# Patient Record
Sex: FEMALE | Race: ASIAN | NOT HISPANIC OR LATINO | Employment: UNEMPLOYED | URBAN - METROPOLITAN AREA
[De-identification: names, ages, dates, MRNs, and addresses within clinical notes are randomized per-mention and may not be internally consistent; named-entity substitution may affect disease eponyms.]

---

## 2017-02-17 DIAGNOSIS — E11.9 TYPE 2 DIABETES MELLITUS WITHOUT COMPLICATION, WITHOUT LONG-TERM CURRENT USE OF INSULIN (H): ICD-10-CM

## 2017-02-17 DIAGNOSIS — I25.110 CORONARY ARTERY DISEASE INVOLVING NATIVE CORONARY ARTERY OF NATIVE HEART WITH UNSTABLE ANGINA PECTORIS (H): ICD-10-CM

## 2017-02-17 DIAGNOSIS — I25.119 CORONARY ARTERY DISEASE INVOLVING NATIVE HEART WITH ANGINA PECTORIS, UNSPECIFIED VESSEL OR LESION TYPE (H): ICD-10-CM

## 2017-02-19 RX ORDER — LISINOPRIL 2.5 MG/1
2.5 TABLET ORAL DAILY
Qty: 90 TABLET | Refills: 3 | Status: SHIPPED | OUTPATIENT
Start: 2017-02-19 | End: 2018-03-20

## 2017-02-19 RX ORDER — ISOSORBIDE MONONITRATE 30 MG/1
15 TABLET, EXTENDED RELEASE ORAL DAILY
Qty: 90 TABLET | Refills: 4 | Status: SHIPPED | OUTPATIENT
Start: 2017-02-19 | End: 2019-01-08

## 2017-02-19 RX ORDER — GLIPIZIDE 2.5 MG/1
2.5 TABLET, EXTENDED RELEASE ORAL DAILY
Qty: 30 TABLET | Refills: 3 | Status: SHIPPED | OUTPATIENT
Start: 2017-02-19 | End: 2017-02-22

## 2017-02-21 DIAGNOSIS — E11.9 TYPE 2 DIABETES MELLITUS WITHOUT COMPLICATION, WITHOUT LONG-TERM CURRENT USE OF INSULIN (H): ICD-10-CM

## 2017-02-21 RX ORDER — METFORMIN HCL 500 MG
2000 TABLET, EXTENDED RELEASE 24 HR ORAL
Qty: 360 TABLET | Refills: 3 | Status: SHIPPED | OUTPATIENT
Start: 2017-02-21 | End: 2018-02-12

## 2017-02-22 DIAGNOSIS — E11.9 TYPE 2 DIABETES MELLITUS WITHOUT COMPLICATION, WITHOUT LONG-TERM CURRENT USE OF INSULIN (H): ICD-10-CM

## 2017-02-22 RX ORDER — GLIPIZIDE 2.5 MG/1
2.5 TABLET, EXTENDED RELEASE ORAL DAILY
Qty: 90 TABLET | Refills: 3 | Status: SHIPPED | OUTPATIENT
Start: 2017-02-22 | End: 2017-04-13

## 2017-02-22 NOTE — TELEPHONE ENCOUNTER
Per Pharmacy: Patient is requesting a 90 day supply instead of 30 day supply, please advise. Thank You!

## 2017-03-02 ENCOUNTER — DOCUMENTATION ONLY (OUTPATIENT)
Dept: FAMILY MEDICINE | Facility: CLINIC | Age: 72
End: 2017-03-02

## 2017-03-02 DIAGNOSIS — J30.89 SEASONAL ALLERGIC RHINITIS DUE TO OTHER ALLERGIC TRIGGER: ICD-10-CM

## 2017-03-02 DIAGNOSIS — E78.00 HYPERCHOLESTEREMIA: ICD-10-CM

## 2017-03-02 DIAGNOSIS — J30.2 SEASONAL ALLERGIC RHINITIS, UNSPECIFIED ALLERGIC RHINITIS TRIGGER: ICD-10-CM

## 2017-03-02 RX ORDER — ATORVASTATIN CALCIUM 80 MG/1
80 TABLET, FILM COATED ORAL DAILY
Qty: 90 TABLET | Refills: 3 | Status: SHIPPED | OUTPATIENT
Start: 2017-03-02 | End: 2018-02-12

## 2017-03-02 RX ORDER — AZELASTINE HYDROCHLORIDE 0.5 MG/ML
1 SOLUTION/ DROPS OPHTHALMIC 2 TIMES DAILY
Qty: 3 BOTTLE | Refills: 3 | Status: SHIPPED | OUTPATIENT
Start: 2017-03-02 | End: 2019-01-08

## 2017-03-02 RX ORDER — LORATADINE 10 MG/1
10 TABLET ORAL DAILY PRN
Qty: 30 TABLET | Refills: 3 | Status: SHIPPED | OUTPATIENT
Start: 2017-03-02 | End: 2017-04-13

## 2017-04-11 DIAGNOSIS — E04.1 THYROID NODULE: Primary | ICD-10-CM

## 2017-04-11 NOTE — PROGRESS NOTES
Received request from [facility] Care Coordinator Criss Cantrell.  Phone [phone]. For Glucerna for the patient.  The patient has not bee to clinic since November 2016 and I have not seen her since  9/22/2016.  She has no medical diagnosis that I am aware of that requires that there is medical necessity for Glucerna supplement.  Her BMI is high at 33.  She does not show any significant weight loss in our records.  If she is having diet or weight loss issues then she needs a visit to discuss this and the possibility of supplement could be discussed at that visit.  I will not be sending a prescription for Glucerna at this time.  If she chooses this as a diet supplement on her own, I think it would be a fine choice for a diabetic and safe for her.    Routed to Care Coordinator to discuss with Criss and ask patient to schedule a follow up visit.  Ask Criss to help ensure patient comes for every 3 month visits for diabetes visits.    Afia Cortez MD

## 2017-04-12 NOTE — PROGRESS NOTES
Called Criss and left a message with the information from Dr Cortez below.    ADDENDUM 4/13/2017 11:58 AM: Criss returned my call and informed me that she passed the information on to patients daughter. She communicated everything that I relayed, including encouraging them to schedule an appointment with Dr Cortez.    Whit Hood

## 2017-04-13 DIAGNOSIS — E11.9 TYPE 2 DIABETES MELLITUS WITHOUT COMPLICATION, WITHOUT LONG-TERM CURRENT USE OF INSULIN (H): ICD-10-CM

## 2017-04-13 DIAGNOSIS — J30.2 SEASONAL ALLERGIC RHINITIS, UNSPECIFIED ALLERGIC RHINITIS TRIGGER: ICD-10-CM

## 2017-04-14 RX ORDER — GLIPIZIDE 2.5 MG/1
2.5 TABLET, EXTENDED RELEASE ORAL DAILY
Qty: 90 TABLET | Refills: 3 | Status: SHIPPED | OUTPATIENT
Start: 2017-04-14 | End: 2018-02-12

## 2017-04-14 RX ORDER — LORATADINE 10 MG/1
10 TABLET ORAL DAILY PRN
Qty: 30 TABLET | Refills: 3 | Status: SHIPPED | OUTPATIENT
Start: 2017-04-14 | End: 2017-11-01

## 2017-05-08 DIAGNOSIS — K59.09 OTHER CONSTIPATION: ICD-10-CM

## 2017-05-08 RX ORDER — SENNOSIDES 8.6 MG
1-2 TABLET ORAL DAILY PRN
Qty: 60 TABLET | Refills: 11 | Status: SHIPPED | OUTPATIENT
Start: 2017-05-08 | End: 2019-01-08

## 2017-11-01 DIAGNOSIS — J30.2 CHRONIC SEASONAL ALLERGIC RHINITIS, UNSPECIFIED TRIGGER: Primary | ICD-10-CM

## 2017-11-03 RX ORDER — LORATADINE 10 MG/1
10 TABLET ORAL DAILY PRN
Qty: 90 TABLET | Refills: 4 | Status: SHIPPED | OUTPATIENT
Start: 2017-11-03 | End: 2019-01-08

## 2018-02-12 DIAGNOSIS — E11.9 TYPE 2 DIABETES MELLITUS WITHOUT COMPLICATION, WITHOUT LONG-TERM CURRENT USE OF INSULIN (H): ICD-10-CM

## 2018-02-12 DIAGNOSIS — E78.00 HYPERCHOLESTEREMIA: ICD-10-CM

## 2018-02-12 DIAGNOSIS — I25.110 CORONARY ARTERY DISEASE INVOLVING NATIVE CORONARY ARTERY OF NATIVE HEART WITH UNSTABLE ANGINA PECTORIS (H): ICD-10-CM

## 2018-02-13 RX ORDER — METFORMIN HCL 500 MG
2000 TABLET, EXTENDED RELEASE 24 HR ORAL
Qty: 360 TABLET | Refills: 3 | Status: SHIPPED | OUTPATIENT
Start: 2018-02-13 | End: 2018-03-20

## 2018-02-13 RX ORDER — GLIPIZIDE 2.5 MG/1
2.5 TABLET, EXTENDED RELEASE ORAL DAILY
Qty: 90 TABLET | Refills: 3 | Status: SHIPPED | OUTPATIENT
Start: 2018-02-13 | End: 2018-03-20

## 2018-02-13 RX ORDER — ATORVASTATIN CALCIUM 80 MG/1
80 TABLET, FILM COATED ORAL DAILY
Qty: 90 TABLET | Refills: 3 | Status: SHIPPED | OUTPATIENT
Start: 2018-02-13 | End: 2019-01-08

## 2018-02-17 ENCOUNTER — HEALTH MAINTENANCE LETTER (OUTPATIENT)
Age: 73
End: 2018-02-17

## 2018-03-11 DIAGNOSIS — E11.9 TYPE 2 DIABETES MELLITUS WITHOUT COMPLICATION, WITHOUT LONG-TERM CURRENT USE OF INSULIN (H): Primary | ICD-10-CM

## 2018-03-11 DIAGNOSIS — D50.8 IRON DEFICIENCY ANEMIA SECONDARY TO INADEQUATE DIETARY IRON INTAKE: ICD-10-CM

## 2018-03-12 ENCOUNTER — OFFICE VISIT (OUTPATIENT)
Dept: FAMILY MEDICINE | Facility: CLINIC | Age: 73
End: 2018-03-12
Payer: MEDICARE

## 2018-03-12 VITALS
DIASTOLIC BLOOD PRESSURE: 83 MMHG | HEART RATE: 77 BPM | HEIGHT: 59 IN | BODY MASS INDEX: 35.84 KG/M2 | SYSTOLIC BLOOD PRESSURE: 144 MMHG | WEIGHT: 177.8 LBS

## 2018-03-12 DIAGNOSIS — M54.42 CHRONIC LEFT-SIDED LOW BACK PAIN WITH LEFT-SIDED SCIATICA: Primary | ICD-10-CM

## 2018-03-12 DIAGNOSIS — D50.8 IRON DEFICIENCY ANEMIA SECONDARY TO INADEQUATE DIETARY IRON INTAKE: ICD-10-CM

## 2018-03-12 DIAGNOSIS — E11.9 TYPE 2 DIABETES MELLITUS WITHOUT COMPLICATION, WITHOUT LONG-TERM CURRENT USE OF INSULIN (H): ICD-10-CM

## 2018-03-12 DIAGNOSIS — G89.29 CHRONIC LEFT-SIDED LOW BACK PAIN WITH LEFT-SIDED SCIATICA: Primary | ICD-10-CM

## 2018-03-12 LAB
ANION GAP SERPL CALCULATED.3IONS-SCNC: 9 MMOL/L (ref 5–18)
BUN SERPL-MCNC: 9 MG/DL (ref 8–22)
CALCIUM SERPL-MCNC: 8.4 MG/DL (ref 8.5–10.5)
CHLORIDE SERPL-SCNC: 104 MMOL/L (ref 98–107)
CHOLEST SERPL-MCNC: 185 MG/DL
CO2 SERPL-SCNC: 24 MMOL/L (ref 22–31)
CREAT SERPL-MCNC: 0.71 MG/DL (ref 0.6–1.1)
FASTING?: ABNORMAL
GLUCOSE SERPL-MCNC: 356 MG/DL (ref 70–125)
HBA1C MFR BLD: 10.8 % (ref 4.1–5.7)
HCT VFR BLD AUTO: 39.7 % (ref 35–47)
HDLC SERPL-MCNC: 43 MG/DL
HEMOGLOBIN: 11.4 G/DL (ref 11.7–15.7)
LDLC SERPL CALC-MCNC: 120 MG/DL
MCH RBC QN AUTO: 20.4 PG (ref 26.5–35)
MCHC RBC AUTO-ENTMCNC: 28.7 G/DL (ref 32–36)
MCV RBC AUTO: 71.1 FL (ref 78–100)
PLATELET # BLD AUTO: 224 K/UL (ref 150–450)
POTASSIUM SERPL-SCNC: 4 MMOL/L (ref 3.5–5)
RBC # BLD AUTO: 5.6 M/UL (ref 3.8–5.2)
SODIUM SERPL-SCNC: 137 MMOL/L (ref 136–145)
TRIGL SERPL-MCNC: 108 MG/DL
WBC # BLD AUTO: 4.8 K/UL (ref 4–11)

## 2018-03-12 RX ORDER — ACETAMINOPHEN 500 MG
1000 TABLET ORAL 3 TIMES DAILY PRN
Qty: 200 TABLET | Refills: 12 | Status: SHIPPED | OUTPATIENT
Start: 2018-03-12 | End: 2019-01-08

## 2018-03-12 RX ORDER — GABAPENTIN 300 MG/1
CAPSULE ORAL
Qty: 90 CAPSULE | Refills: 1 | Status: SHIPPED | OUTPATIENT
Start: 2018-03-12 | End: 2018-05-15

## 2018-03-12 NOTE — MR AVS SNAPSHOT
After Visit Summary   3/12/2018    Jenise Paz    MRN: [id number]           Patient Information     Date Of Birth          7/1/1946        Visit Information        Provider Department      3/12/2018 11:20 AM Afia Cortez MD [facility]        Today's Diagnoses     Chronic left-sided low back pain with left-sided sciatica    -  1    Type 2 diabetes mellitus without complication, without long-term current use of insulin (H)        Iron deficiency anemia secondary to inadequate dietary iron intake          Care Instructions    -Please bring your medications to your next appointment.    -Make appointment with Dr. Cortez next week and with the pharmacist.    -Should see a Physical Therapist.  Placed the order, someone will be calling you.    -          Follow-ups after your visit        Additional Services     PHYSICAL THERAPY REFERRAL       PT/OT REFERRAL  Jenise Paz  7/1/1946  Phone #: [phone] (home)    needed: Yes  Language: Jennifer    PT/OT  Facility:   [facility], P: [phone], F: [phone]    History: Pt requesting eval for mobility outside of the home    Precautions/Contraindications: None    Imaging Studies:     Surgical Procedure/Test Results: None    Treatment Goals:   Other:     Prognosis: good        Evaluate: Evaluate and Specific Order    Plan: Manual Therapy                  Follow-up notes from your care team     Return in about 1 week (around 3/19/2018).      Future tests that were ordered for you today     Open Future Orders        Priority Expected Expires Ordered    PHYSICAL THERAPY REFERRAL Routine  3/12/2019 3/12/2018            Who to contact     Please call your clinic at [phone] to:    Ask questions about your health    Make or cancel appointments    Discuss your medicines    Learn about your test results    Speak to your doctor            Additional Information About Your Visit        Tavo Information     Tavo is an  "electronic gateway that provides easy, online access to your medical records. With [employer], you can request a clinic appointment, read your test results, renew a prescription or communicate with your care team.     To sign up for [employer] visit the website at [email or web address]   You will be asked to enter the access code listed below, as well as some personal information. Please follow the directions to create your username and password.     Your access code is: [email or web address]  Expires: 6/10/2018 12:41 PM     Your access code will  in 90 days. If you need help or a new code, please contact your [facility] or call [phone] for assistance.        Care EveryWhere ID     This is your Care EveryWhere ID. This could be used by other organizations to access your [facility] medical records  [id number]        Your Vitals Were     Pulse Height BMI (Body Mass Index)             77 4' 10.5\" (148.6 cm) 36.53 kg/m2          Blood Pressure from Last 3 Encounters:   18 144/83   16 106/67   16 103/64    Weight from Last 3 Encounters:   18 177 lb 12.8 oz (80.6 kg)   16 162 lb 9.6 oz (73.8 kg)   16 160 lb 12.8 oz (72.9 kg)              We Performed the Following     Basic Metabolic Profile ([facility])     CBC with Plt ([facility])     Hemoglobin A1c ([facility])     Lipid [place] ([facility] - Results > 1 hr     Microalbumin Random Ur ([facility])          Today's Medication Changes          These changes are accurate as of 3/12/18 12:41 PM.  If you have any questions, ask your nurse or doctor.               Start taking these medicines.        Dose/Directions    gabapentin 300 MG capsule   Commonly known as:  NEURONTIN   Used for:  Chronic left-sided low back pain with left-sided sciatica   Started by:  Afia Cortez MD        Take 1 tablet (300 mg) every night for 3 days, then 1 tablet twice daily for 3 days, then 1 tablet three times daily   Quantity:  " 90 capsule   Refills:  1            Where to get your medicines      These medications were sent to [facility] - [place], [place] - [address], [place] [address]     Phone:  [phone]     acetaminophen 500 MG tablet    gabapentin 300 MG capsule                Primary Care Provider Office Phone # Fax #    Afia Cortez [phone]       [facility] [address] [facility] [address]        Equal Access to Services     ERICKA FRAIRE : Hadii aad ku hadasho Soomaali, waaxda luqadaha, qaybta kaalmada adeegyada, waxay idiin hayaan adeeg salvatorearagato latami . So [facility] [phone].    ATENCIÓN: Si habla español, tiene a alvarez disposición servicios gratuitos de asistencia lingüística. [facility] [phone].    We comply with applicable federal civil rights laws and [place] laws. We do not discriminate on the basis of race, color, national origin, age, disability, sex, sexual orientation, or gender identity.            Thank you!     Thank you for choosing [facility]  for your care. Our goal is always to provide you with excellent care. Hearing back from our patients is one way we can continue to improve our services. Please take a few minutes to complete the written survey that you may receive in the mail after your visit with us. Thank you!             Your Updated Medication List - Protect others around you: Learn how to safely use, store and throw away your medicines at www.disposemymeds.org.          This list is accurate as of 3/12/18 12:41 PM.  Always use your most recent med list.                   Brand Name Dispense Instructions for use Diagnosis    acetaminophen 500 MG tablet    TYLENOL    200 tablet    Take 2 tablets (1,000 mg) by mouth 3 times daily as needed    Chronic left-sided low back pain with left-sided sciatica       aspirin 81 MG tablet     90 tablet    Take 1 tablet daily    Coronary artery disease involving native coronary artery of native  heart with unstable angina pectoris (H)       atorvastatin 80 MG tablet    LIPITOR    90 tablet    Take 1 tablet (80 mg) by mouth daily    Hypercholesteremia       azelastine 0.05 % Soln ophthalmic solution    OPTIVAR    3 Bottle    Place 1 drop into both eyes 2 times daily    Seasonal allergic rhinitis due to other allergic trigger       blood glucose monitoring lancets      Test once daily        blood glucose monitoring test strip    LAURA CONTOUR    1 Box    Use to test blood sugars 1 times daily or as directed.    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-sided sciatica       glipiZIDE 2.5 MG 24 hr tablet    glipiZIDE XL    90 tablet    Take 1 tablet (2.5 mg) by mouth daily    Type 2 diabetes mellitus without complication, without long-term current use of insulin (H)       isosorbide mononitrate 30 MG 24 hr tablet    IMDUR    90 tablet    Take 0.5 tablets (15 mg) by mouth daily    Coronary artery disease involving native heart with angina pectoris, unspecified vessel or lesion type (H)       lisinopril 2.5 MG tablet    PRINIVIL/Zestril    90 tablet    Take 1 tablet (2.5 mg) by mouth daily    Coronary artery disease involving native coronary artery of native heart with unstable angina pectoris (H)       loratadine 10 MG tablet    CLARITIN    90 tablet    Take 1 tablet (10 mg) by mouth daily as needed for allergies    Chronic seasonal allergic rhinitis, unspecified trigger       metFORMIN 500 MG 24 hr tablet    GLUCOPHAGE-XR    360 tablet    Take 4 tablets (2,000 mg) by mouth daily (with dinner)    Type 2 diabetes mellitus without complication, without long-term current use of insulin (H)       metoprolol succinate 25 MG 24 hr tablet     TOPROL-XL    90 tablet    Take 1 tablet (25 mg) by mouth daily    Coronary artery disease involving native heart with angina pectoris, unspecified vessel or lesion type (H)       nitroGLYcerin 0.4 MG sublingual tablet    NITROSTAT    25 tablet    Place 1 tablet (0.4 mg) under the tongue every 5 minutes as needed for chest pain    Coronary artery disease involving native coronary artery of native heart with unstable angina pectoris (H)       order for DME     1 Device    Equipment being ordered: Cane    Coronary artery disease involving native coronary artery of native heart with unstable angina pectoris (H), DDD (degenerative disc disease), lumbar       sennosides 8.6 MG tablet    SENOKOT    60 tablet    Take 1-2 tablets by mouth daily as needed for constipation    Other constipation

## 2018-03-12 NOTE — LETTER
March 15, 2018      Jenise [address] [facility] [address]        Dear Jenise,    Please see below for your test results.    Resulted Orders   Hemoglobin A1c ([facility])   Result Value Ref Range    Hemoglobin A1C 10.8 (H) 4.1 - 5.7 %   CBC with Plt ([facility])   Result Value Ref Range    WBC 4.8 4.0 - 11.0 K/uL    RBC 5.6 (H) 3.8 - 5.2 M/uL    Hemoglobin 11.4 (L) 11.7 - 15.7 g/dL    Hematocrit 39.7 35.0 - 47.0 %    MCV 71.1 (L) 78.0 - 100.0 fL    MCH 20.4 (L) 26.5 - 35.0    MCHC 28.7 (L) 32.0 - 36.0 g/dL    Platelets 224.0 150.0 - 450.0 K/uL   Basic Metabolic Profile ([facility])   Result Value Ref Range    Sodium 137 136 - 145 mmol/L    Potassium 4.0 3.5 - 5.0 mmol/L    Chloride 104 98 - 107 mmol/L    CO2, Total 24 22 - 31 mmol/L    Anion Gap 9 5 - 18 mmol/L    Glucose 356 (H) 70 - 125 mg/dL    Calcium 8.4 (L) 8.5 - 10.5 mg/dL    Urea Nitrogen 9 8 - 22 mg/dL    Creatinine 0.71 0.60 - 1.10 mg/dL    GFR Estimate If Black >60 >60 mL/min/1.73m2    GFR Estimate >60 >60 mL/min/1.73m2    Narrative    Test performed by:  [facility]  [address] [place] [address]  Fasting Glucose reference range is 70-99 mg/dL per  American Diabetes Association (ADA) guidelines.   Lipid [place] ([facility]) - Results > 1 hr   Result Value Ref Range    Cholesterol 185 <=199 mg/dL    Triglycerides 108 <=149 mg/dL    HDL Cholesterol 43 (L) >=50 mg/dL    LDL Cholesterol Calculated 120 <=129 mg/dL    Fasting? Unknown     Narrative    Test performed by:  [facility]  [address] [place] [address]     Your diabetes is very poorly controlled and your sugar was high at 356.  Your cholesterol was improved from the past which will help protect your heart.  We need you to continue your statin medicine for this reason.  Your blood count was a little low but better than the past.  We should recheck your iron levels and consider further work up for what is causing the low blood count.  Your kidney function was  normal but we need to do a urine test at the next visit to look for leaking protein in the urine.      If you have any questions, please call the clinic to make an appointment.    Sincerely,    Afia Cortez MD

## 2018-03-12 NOTE — PATIENT INSTRUCTIONS
-Please bring your medications to your next appointment.    -Make appointment with Dr. Cortez next week and with the pharmacist.    -Should see a Physical Therapist.  Placed the order, someone will be calling you.    -[facility]  Phone: [phone]  Fax: [phone]  Clinic will contact patient to schedule  Jose 12, 2018    Pharm D  3/20/18 @ 2:50 with Moises
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
## 2018-03-13 ENCOUNTER — AMBULATORY - HEALTHEAST (OUTPATIENT)
Dept: ADMINISTRATIVE | Facility: REHABILITATION | Age: 73
End: 2018-03-13

## 2018-03-13 DIAGNOSIS — M54.40 CHRONIC LEFT-SIDED LOW BACK PAIN WITH SCIATICA: ICD-10-CM

## 2018-03-13 DIAGNOSIS — G89.29 CHRONIC LEFT-SIDED LOW BACK PAIN WITH SCIATICA: ICD-10-CM

## 2018-03-15 NOTE — PROGRESS NOTES
Your diabetes is very poorly controlled and your sugar was high at 356.  Your cholesterol was improved from the past which will help protect your heart.  We need you to continue your statin medicine for this reason.  Your blood count was a little low but better than the past.  We should recheck your iron levels and consider further work up for what is causing the low blood count.  Your kidney function was normal but we need to do a urine test at the next visit to look for leaking protein in the urine.

## 2018-03-20 ENCOUNTER — OFFICE VISIT (OUTPATIENT)
Dept: PHARMACY | Facility: CLINIC | Age: 73
End: 2018-03-20
Payer: MEDICARE

## 2018-03-20 ENCOUNTER — OFFICE VISIT (OUTPATIENT)
Dept: FAMILY MEDICINE | Facility: CLINIC | Age: 73
End: 2018-03-20
Payer: MEDICARE

## 2018-03-20 VITALS
HEART RATE: 87 BPM | DIASTOLIC BLOOD PRESSURE: 79 MMHG | BODY MASS INDEX: 36.2 KG/M2 | WEIGHT: 176.2 LBS | SYSTOLIC BLOOD PRESSURE: 135 MMHG

## 2018-03-20 DIAGNOSIS — I25.119 CORONARY ARTERY DISEASE INVOLVING NATIVE HEART WITH ANGINA PECTORIS, UNSPECIFIED VESSEL OR LESION TYPE (H): ICD-10-CM

## 2018-03-20 DIAGNOSIS — E11.9 TYPE 2 DIABETES MELLITUS WITHOUT COMPLICATION, WITHOUT LONG-TERM CURRENT USE OF INSULIN (H): ICD-10-CM

## 2018-03-20 DIAGNOSIS — I25.110 CORONARY ARTERY DISEASE INVOLVING NATIVE CORONARY ARTERY OF NATIVE HEART WITH UNSTABLE ANGINA PECTORIS (H): ICD-10-CM

## 2018-03-20 DIAGNOSIS — I10 ESSENTIAL HYPERTENSION: Primary | ICD-10-CM

## 2018-03-20 RX ORDER — GLIPIZIDE 10 MG/1
20 TABLET, FILM COATED, EXTENDED RELEASE ORAL DAILY
Qty: 180 TABLET | Refills: 4 | Status: SHIPPED | OUTPATIENT
Start: 2018-03-20 | End: 2019-01-08

## 2018-03-20 RX ORDER — LISINOPRIL 2.5 MG/1
2.5 TABLET ORAL DAILY
Qty: 90 TABLET | Refills: 3 | Status: SHIPPED | OUTPATIENT
Start: 2018-03-20 | End: 2019-01-08

## 2018-03-20 RX ORDER — METOPROLOL SUCCINATE 25 MG/1
25 TABLET, EXTENDED RELEASE ORAL DAILY
Qty: 90 TABLET | Refills: 4 | Status: SHIPPED | OUTPATIENT
Start: 2018-03-20 | End: 2019-01-08

## 2018-03-20 RX ORDER — NITROGLYCERIN 0.4 MG/1
0.4 TABLET SUBLINGUAL EVERY 5 MIN PRN
Qty: 25 TABLET | Refills: 12 | Status: SHIPPED | OUTPATIENT
Start: 2018-03-20 | End: 2019-01-08

## 2018-03-20 NOTE — MR AVS SNAPSHOT
After Visit Summary   3/20/2018    Jenise Paz    MRN: [id number]           Patient Information     Date Of Birth          1946        Visit Information        Provider Department      3/20/2018 2:30 PM Afia Cortez MD [facility]        Today's Diagnoses     Coronary artery disease involving native coronary artery of native heart with unstable angina pectoris (H)        Coronary artery disease involving native heart with angina pectoris, unspecified vessel or lesion type (H)        Type 2 diabetes mellitus without complication, without long-term current use of insulin (H)          Care Instructions    Restart lisinopril 1 pill once daily  Restart metoprolol 1 pill once daily  Restart aspirin 1 pill once daily  Increase Glipizide to new strength (10mg)- 2 pills once daily  Start Januvia 100mg once daily  Fill new nitroglycerin to put under tongue when you have chest pain.    Stop the Metformin for now.                Follow-ups after your visit        Follow-up notes from your care team     Return in about 1 month (around 2018).      Who to contact     Please call your clinic at [phone] to:    Ask questions about your health    Make or cancel appointments    Discuss your medicines    Learn about your test results    Speak to your doctor            Additional Information About Your Visit        MyChart Information     Nettlet is an electronic gateway that provides easy, online access to your medical records. With [employer], you can request a clinic appointment, read your test results, renew a prescription or communicate with your care team.     To sign up for Nettlet visit the website at [email or web address]   You will be asked to enter the access code listed below, as well as some personal information. Please follow the directions to create your username and password.     Your access code is: [email or web address]  Expires: 6/10/2018 12:41 PM     Your access code will  in 90 days.  If you need help or a new code, please contact your [facility] or call [phone] for assistance.        Care EveryWhere ID     This is your Care EveryWhere ID. This could be used by other organizations to access your [place] medical records  [id number]        Your Vitals Were     Pulse BMI (Body Mass Index)                87 36.2 kg/m2           Blood Pressure from Last 3 Encounters:   03/20/18 135/79   03/12/18 144/83   11/07/16 106/67    Weight from Last 3 Encounters:   03/20/18 176 lb 3.2 oz (79.9 kg)   03/12/18 177 lb 12.8 oz (80.6 kg)   11/07/16 162 lb 9.6 oz (73.8 kg)              Today, you had the following     No orders found for display         Today's Medication Changes          These changes are accurate as of 3/20/18  3:56 PM.  If you have any questions, ask your nurse or doctor.               Start taking these medicines.        Dose/Directions    sitagliptin 100 MG tablet   Commonly known as:  JANUVIA   Used for:  Type 2 diabetes mellitus without complication, without long-term current use of insulin (H)   Started by:  Afia Cortez MD        Dose:  100 mg   Take 1 tablet (100 mg) by mouth daily   Quantity:  90 tablet   Refills:  3         These medicines have changed or have updated prescriptions.        Dose/Directions    glipiZIDE 10 MG 24 hr tablet   Commonly known as:  glipiZIDE XL   This may have changed:    - medication strength  - how much to take   Used for:  Type 2 diabetes mellitus without complication, without long-term current use of insulin (H)   Changed by:  Afia Cortez MD        Dose:  20 mg   Take 2 tablets (20 mg) by mouth daily   Quantity:  180 tablet   Refills:  4         Stop taking these medicines if you haven't already. Please contact your care team if you have questions.     metFORMIN 500 MG 24 hr tablet   Commonly known as:  GLUCOPHAGE-XR   Stopped by:  Afia Cortez MD                Where to get your medicines      These  medications were sent to [facility] - [place], [place] - [address], [place] [address]     Phone:  [phone]     aspirin 81 MG tablet    glipiZIDE 10 MG 24 hr tablet    lisinopril 2.5 MG tablet    metoprolol succinate 25 MG 24 hr tablet    nitroGLYcerin 0.4 MG sublingual tablet    sitagliptin 100 MG tablet                Primary Care Provider Office Phone # Fax #    Afia Cortez [phone]       [address] [address]        Equal Access to Services     : Hadii aad ku hadasho Soomaali, waaxda luqadaha, qaybta kaalmada adeegyada, betty shields . So [facility] [phone].    ATENCIÓN: Si habla español, tiene a alvarez disposición servicios gratuitos de asistencia lingüística. [facility] [phone].    We comply with applicable federal civil rights laws and [place] laws. We do not discriminate on the basis of race, color, national origin, age, disability, sex, sexual orientation, or gender identity.            Thank you!     Thank you for choosing [facility]  for your care. Our goal is always to provide you with excellent care. Hearing back from our patients is one way we can continue to improve our services. Please take a few minutes to complete the written survey that you may receive in the mail after your visit with us. Thank you!             Your Updated Medication List - Protect others around you: Learn how to safely use, store and throw away your medicines at www.disposemymeds.org.          This list is accurate as of 3/20/18  3:56 PM.  Always use your most recent med list.                   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-sided sciatica       glipiZIDE 10 MG 24 hr tablet    glipiZIDE XL    180 tablet    Take 2 tablets (20 mg) by mouth daily    Type 2 diabetes mellitus without complication, without long-term current use of insulin (H)       isosorbide mononitrate 30 MG 24 hr tablet    IMDUR    90 tablet    Take 0.5 tablets (15 mg) by mouth daily    Coronary artery disease involving native heart with angina pectoris, unspecified vessel or lesion type (H)       lisinopril 2.5 MG tablet    PRINIVIL/Zestril    90 tablet    Take 1 tablet (2.5 mg) by mouth daily    Coronary artery disease involving native coronary artery of native heart with unstable angina pectoris (H)       loratadine 10 MG tablet    CLARITIN    90 tablet    Take 1 tablet (10 mg) by mouth daily as needed for allergies    Chronic seasonal allergic rhinitis, unspecified trigger       metoprolol succinate 25 MG 24 hr tablet    TOPROL-XL    90 tablet    Take 1 tablet (25 mg) by mouth daily    Coronary artery disease involving native heart with angina pectoris, unspecified vessel or  lesion type (H)       nitroGLYcerin 0.4 MG sublingual tablet    NITROSTAT    25 tablet    Place 1 tablet (0.4 mg) under the tongue every 5 minutes as needed for chest pain    Coronary artery disease involving native coronary artery of native heart with unstable angina pectoris (H)       order for DME     1 Device    Equipment being ordered: Cane    Coronary artery 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

## 2018-03-20 NOTE — MR AVS SNAPSHOT
After Visit Summary   3/20/2018    Jenise Paz    MRN: [id number]           Patient Information     Date Of Birth          1946        Visit Information        Provider Department      3/20/2018 2:50 PM Beth De Leon, [facility]        Today's Diagnoses     Essential hypertension    -  1    Type 2 diabetes mellitus without complication, without long-term current use of insulin (H)        Coronary artery disease involving native coronary artery of native heart with unstable angina pectoris (H)           Follow-ups after your visit        Who to contact     Please call your clinic at [phone] to:    Ask questions about your health    Make or cancel appointments    Discuss your medicines    Learn about your test results    Speak to your doctor            Additional Information About Your Visit        MyChart Information     From [employer] is an electronic gateway that provides easy, online access to your medical records. With [employer], you can request a clinic appointment, read your test results, renew a prescription or communicate with your care team.     To sign up for [employer] visit the website at [email or web address]   You will be asked to enter the access code listed below, as well as some personal information. Please follow the directions to create your username and password.     Your access code is: [email or web address]  Expires: 6/10/2018 12:41 PM     Your access code will  in 90 days. If you need help or a new code, please contact your [facility] or call [phone] for assistance.        Care EveryWhere ID     This is your Care EveryWhere ID. This could be used by other organizations to access your [place] medical records  [id number]         Blood Pressure from Last 3 Encounters:   18 135/79   18 144/83   16 106/67    Weight from Last 3 Encounters:   18 176 lb 3.2 oz (79.9 kg)   18 177 lb 12.8 oz (80.6 kg)   16 162  lb 9.6 oz (73.8 kg)              We Performed the Following     MTM, EA ADDITIONAL 15 MIN (09323) x 3 (= 45 min.)          Today's Medication Changes          These changes are accurate as of 3/20/18 11:59 PM.  If you have any questions, ask your nurse or doctor.               Start taking these medicines.        Dose/Directions    sitagliptin 100 MG tablet   Commonly known as:  JANUVIA   Used for:  Type 2 diabetes mellitus without complication, without long-term current use of insulin (H)   Started by:  Afia Cortez MD        Dose:  100 mg   Take 1 tablet (100 mg) by mouth daily   Quantity:  90 tablet   Refills:  3         These medicines have changed or have updated prescriptions.        Dose/Directions    glipiZIDE 10 MG 24 hr tablet   Commonly known as:  glipiZIDE XL   This may have changed:    - medication strength  - how much to take   Used for:  Type 2 diabetes mellitus without complication, without long-term current use of insulin (H)   Changed by:  Afia Cortez MD        Dose:  20 mg   Take 2 tablets (20 mg) by mouth daily   Quantity:  180 tablet   Refills:  4         Stop taking these medicines if you haven't already. Please contact your care team if you have questions.     metFORMIN 500 MG 24 hr tablet   Commonly known as:  GLUCOPHAGE-XR   Stopped by:  Afia Cortez MD                Where to get your medicines      These medications were sent to [facility] - [place], [place] - [address], [place] [address]     Phone:  [phone]     aspirin 81 MG tablet    glipiZIDE 10 MG 24 hr tablet    lisinopril 2.5 MG tablet    metoprolol succinate 25 MG 24 hr tablet    nitroGLYcerin 0.4 MG sublingual tablet    sitagliptin 100 MG tablet                Primary Care Provider Office Phone # Fax #    Afia Cortez [phone]       [address] [address]        Equal Access to Services     ERICKA FRAIRE AH: Charlie valdez  Isrrael, wabettyda luqadaha, qaybta kaaltony villalba, betty yusefin hayaan manishha salvatorebetsey laSantiagocherrie yao. So [facility] [phone].    ATENCIÓN: Si nadja bush, tiene a alvarez disposición servicios gratuitos de asistencia lingüística. Alonso al [phone].    We comply with applicable federal civil rights laws and [place] laws. We do not discriminate on the basis of race, color, national origin, age, disability, sex, sexual orientation, or gender identity.            Thank you!     Thank you for choosing [facility]  for your care. Our goal is always to provide you with excellent care. Hearing back from our patients is one way we can continue to improve our services. Please take a few minutes to complete the written survey that you may receive in the mail after your visit with us. Thank you!             Your Updated Medication List - Protect others around you: Learn how to safely use, store and throw away your medicines at www.disposemymeds.org.          This list is accurate as of 3/20/18 11:59 PM.  Always use your most recent med list.                   Brand Name Dispense Instructions for use Diagnosis    acetaminophen 500 MG tablet    TYLENOL    200 tablet    Take 2 tablets (1,000 mg) by mouth 3 times daily as needed    Chronic left-sided low back pain with left-sided sciatica       aspirin 81 MG tablet     90 tablet    Take 1 tablet daily    Coronary artery disease involving native coronary artery of native heart with unstable angina pectoris (H)       atorvastatin 80 MG tablet    LIPITOR    90 tablet    Take 1 tablet (80 mg) by mouth daily    Hypercholesteremia       azelastine 0.05 % Soln ophthalmic solution    OPTIVAR    3 Bottle    Place 1 drop into both eyes 2 times daily    Seasonal allergic rhinitis due to other allergic trigger       blood glucose monitoring lancets      Test once daily        blood glucose monitoring test strip    LAURA CONTOUR    1 Box    Use to test blood sugars 1 times daily or as directed.     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-sided sciatica       glipiZIDE 10 MG 24 hr tablet    glipiZIDE XL    180 tablet    Take 2 tablets (20 mg) by mouth daily    Type 2 diabetes mellitus without complication, without long-term current use of insulin (H)       isosorbide mononitrate 30 MG 24 hr tablet    IMDUR    90 tablet    Take 0.5 tablets (15 mg) by mouth daily    Coronary artery disease involving native heart with angina pectoris, unspecified vessel or lesion type (H)       lisinopril 2.5 MG tablet    PRINIVIL/Zestril    90 tablet    Take 1 tablet (2.5 mg) by mouth daily    Coronary artery disease involving native coronary artery of native heart with unstable angina pectoris (H)       loratadine 10 MG tablet    CLARITIN    90 tablet    Take 1 tablet (10 mg) by mouth daily as needed for allergies    Chronic seasonal allergic rhinitis, unspecified trigger       metoprolol succinate 25 MG 24 hr tablet    TOPROL-XL    90 tablet    Take 1 tablet (25 mg) by mouth daily    Coronary artery disease involving native heart with angina pectoris, unspecified vessel or lesion type (H)       nitroGLYcerin 0.4 MG sublingual tablet    NITROSTAT    25 tablet    Place 1 tablet (0.4 mg) under the tongue every 5 minutes as needed for chest pain    Coronary artery disease involving native coronary artery of native heart with unstable angina pectoris (H)       sennosides 8.6 MG tablet    SENOKOT    60 tablet    Take 1-2 tablets by mouth daily as needed for constipation    Other constipation       sitagliptin 100 MG tablet    JANUVIA    90 tablet    Take 1 tablet (100 mg) by mouth daily    Type 2 diabetes mellitus without complication, without long-term  current use of insulin (H)

## 2018-03-20 NOTE — PROGRESS NOTES
Medication Management Note                                                       Jenise was referred by Dr. Cortez for pharmacy services for med management.     MEDICATION REVIEW:  Discussed all medication indications, dosage and effectiveness, adverse effects, and adherence with patient/caregiver.    Pt had meds with them: yes - only had APAP, atorvastatin, gabapentin, glipizide, isosorbide, and loratadine and said that is all she is taking.   Pt had med list with them: no  Pt was knowledgeable about meds: no  Medications set up by: daughter-in-law   Medications administered by someone else (e.g., LTC[facility]): No  Pt uses a medication box or automated dispenser: yes  Called pharmacy to obtain or clarify med list:  no  Called HHN or [facility] to obtain or clarify med list:  no    Medication Discrepancies  Medications on EMR med list that pt is NOT taking:  Yes, patient did not have bottles for ASA, lisinopril, metformin, metoprolol, and NTG.     Metformin removed from her medication list but all others were left as she is supposed to take them  Medications pt IS taking that are NOT on EMR med list (e.g., from specialist, hospital): none  OTC meds/ dietary supplements pt taking on own that are NOT on EMR med list:  yes, patient drinks herbal teas  Dosage listed differently than how patient is taking: none  Frequency listed differently than how patient is taking: possibly - patient may only be taking gabapentin BID as her pill box only has 2 compartments per day. Based on Rx titration from 3/12 she should be on TID currently. This was not changed on her medication list because she was not sure how often she took it.   Duplicate medication on list (two occurrences of the same medication):  none  TOTAL NUMBER OF MEDICATION DISCREPANCIES:  6    Subjective                                                       Patient reports the following problems or concerns with their medications:  She wants to take fewer medications; she prefers  "taking natural, traditional meds and boil roots to make medicinal tea   Patient reports the following adverse reactions to medications:  none  Pt reports missing doses:  1-2 times per week  Additional subjective information (e.g., reason for visit, frequency of PRNs, reasons meds were D/C ed):    Patient has her daughter-in-law set up her medications for her. She states she doesn't \"forget\" to take her medication, but 1-2 times per week she decides to skip doses because she does not want to take them. Her son thinks she actually skips doses more frequently than this, and they feel they cannot \"force\" her to take meds she doesn't want to take.     Patient states she only uses [facility].    When talking about the glipizide, she mentioned she tries \"not to eat too much of this\" because she is trying to change her diet instead.     She stated she does not take the NTG anymore because it was as needed and she does not have any more chest pain.    She reports a home BG of 237 sometime last week.      Objective                                                       Patient Active Problem List   Diagnosis     Abnormal Pap smear of cervix     Cervical spondylosis     PTSD (post-traumatic stress disorder)     Hyperlipemia     Hypertension     Low back pain Chronic     DDD (degenerative disc disease), lumbar     Muscle Aches, Generalized (Myalgia)     Thyroid nodule     History of subtotal thyroidectomy     Vitamin D deficiency     Seasonal allergies     Noncompliance with medication regimen     Type 2 diabetes mellitus without complication, without long-term current use of insulin (H)     Coronary artery disease involving native coronary artery of native heart with unstable angina pectoris (H)     Anemia, iron deficiency     S/P CABG (coronary artery bypass graft)     Current Outpatient Prescriptions   Medication Sig Dispense Refill     lisinopril (PRINIVIL/ZESTRIL) 2.5 MG tablet Take 1 tablet (2.5 mg) by mouth daily 90 " tablet 3     metoprolol succinate (TOPROL-XL) 25 MG 24 hr tablet Take 1 tablet (25 mg) by mouth daily 90 tablet 4     glipiZIDE (GLUCOTROL XL) 10 MG 24 hr tablet Take 2 tablets (20 mg) by mouth daily 180 tablet 4     sitagliptin (JANUVIA) 100 MG tablet Take 1 tablet (100 mg) by mouth daily 90 tablet 3     gabapentin (NEURONTIN) 300 MG capsule Take 1 tablet (300 mg) every night for 3 days, then 1 tablet twice daily for 3 days, then 1 tablet three times daily 90 capsule 1     acetaminophen (TYLENOL) 500 MG tablet Take 2 tablets (1,000 mg) by mouth 3 times daily as needed 200 tablet 12     aspirin 81 MG tablet Take 1 tablet daily 90 tablet 4     atorvastatin (LIPITOR) 80 MG tablet Take 1 tablet (80 mg) by mouth daily 90 tablet 3     metFORMIN (GLUCOPHAGE-XR) 500 MG 24 hr tablet Take 4 tablets (2,000 mg) by mouth daily (with dinner) 360 tablet 3     [DISCONTINUED] glipiZIDE (GLIPIZIDE XL) 2.5 MG 24 hr tablet Take 1 tablet (2.5 mg) by mouth daily 90 tablet 3     loratadine (CLARITIN) 10 MG tablet Take 1 tablet (10 mg) by mouth daily as needed for allergies 90 tablet 4     sennosides (SENOKOT) 8.6 MG tablet Take 1-2 tablets by mouth daily as needed for constipation 60 tablet 11     azelastine (OPTIVAR) 0.05 % SOLN ophthalmic solution Place 1 drop into both eyes 2 times daily 3 Bottle 3     isosorbide mononitrate (IMDUR) 30 MG 24 hr tablet Take 0.5 tablets (15 mg) by mouth daily 90 tablet 4     [DISCONTINUED] lisinopril (PRINIVIL/ZESTRIL) 2.5 MG tablet Take 1 tablet (2.5 mg) by mouth daily 90 tablet 3     blood glucose monitoring ([email or web address] CONTOUR) test strip Use to test blood sugars 1 times daily or as directed. 1 Box 11     order for DME Equipment being ordered: Cane 1 Device 0     nitroglycerin (NITROSTAT) 0.4 MG SL tablet Place 1 tablet (0.4 mg) under the tongue every 5 minutes as needed for chest pain 25 tablet 12     [DISCONTINUED] metoprolol (TOPROL-XL) 25 MG 24 hr tablet Take 1 tablet (25 mg) by mouth daily 90 tablet  4     Lancets (MICROLET) MISC Test once daily       Social History   Substance Use Topics     Smoking status: Never Smoker     Smokeless tobacco: Never Used     Alcohol use No     Estimated Creatinine Clearance: 94.3 mL/min (based on Cr of 0.71).    Lab Results   Component Value Date    A1C 10.8 03/12/2018    A1C 7.2 11/07/2016    A1C 6.9 04/27/2016    A1C 6.7 12/10/2015    A1C 6.7 04/29/2015     Last Basic Metabolic Panel:  Lab Results   Component Value Date     03/12/2018      Lab Results   Component Value Date    POTASSIUM 4.0 03/12/2018     Lab Results   Component Value Date    CHLORIDE 104 03/12/2018     Lab Results   Component Value Date    SAHIL 8.4 03/12/2018     Lab Results   Component Value Date    CO2 30.0 09/12/2016     Lab Results   Component Value Date    BUN 9 03/12/2018     Lab Results   Component Value Date    CR 0.71 03/12/2018     Lab Results   Component Value Date     03/12/2018     BP Readings from Last 3 Encounters:   03/20/18 135/79   03/12/18 144/83   11/07/16 106/67     The 10-year ASCVD risk score (Corisherman SHELDON Jr, et al., 2013) is: 23.5%    Values used to calculate the score:      Age: 71 years      Sex: Female      Is Non- : No      Diabetic: Yes      Tobacco smoker: No      Systolic Blood Pressure: 135 mmHg      Is BP treated: Yes      HDL Cholesterol: 43 mg/dL      Total Cholesterol: 185 mg/dL    PHQ-9 score:    PHQ-9 SCORE 5/15/2014   Total Score 7     Assessment                                                       CAD -  uncontrolled     Patient with history of CABG in 2016.     She is taking high intensity atorvastatin and isosorbide.     She at one point was prescribed ASA, metoprolol, lisinopril and NTG as they are on her medication list, but she has not been taking them. These are medications that would benefit her due to her CAD. She is not on any other blood thinners or antiplatelet medications nor does she have other contraindications to ASA.      The NTG also another medication to be added because if she has sudden onset chest pain, she will need it for vasodilation and symptom relief, or early management if she suffers an MI.     Patient /79 and HR 87 in clinic today, so adding back metoprolol would be safe.     HTN -  uncontrolled     BP in clinic today 135/79 which is at goal of <140/90 per JNC8 but over goal of <130/80 per 2017 AHA/ACC guidelines.     Patient is not taking her lisinopril and metoprolol (see above) and her BP in clinic on 3/12 was elevated.     Potassium was 4.0 in clinic 3/12 and CrCL estimated 70 mL/min per calculator.     Type 2 Diabetes -  uncontrolled     Patient A1c was 10.8 on 3/12 which is over goal of <7%.     She takes glipizide (sometimes) but is not taking the metformin. She is also taking gabapentin.     Due to her severely elevated A1c, it would be beneficial to increase her to the maximum dose of glipizide and start another agent like sitagliptin.     Metformin should be used, but starting metformin today may not be best as it tends to cause stomach upset which may be a motivation for her to stop taking her medications. Her kidney function is fine, so this would ideally be started in the near future.     Osteoporosis/Osteopenia -  uncontrolled     Patient had a DEXA scan from 2014 with lumbar spine T score of -2.6; osteopenia at other sites    Patient does not currently have any treatments for osteroporosis    Polypharmacy -  uncontrolled     Patient takes many medications    She describes skipping her medications purposefully because she doesn't want to take them    Plan/Recommendations                                                       Updated medication list in the EMR; deleted meds patient no longer taking and added meds patient is now taking, and changed doses where there was a dose discrepancy.    All medications were reviewed and found to be indicated, effective, safe and convenient/ affordable unless  drug therapy problem(s) was/were identified, as are described below.      Completed at this visit     CAD/HTN    Continue atorvastatin and isosorbide.     Restart aspirin, metoprolol, lisinopril and nitroglycerin.     Type 2 Diabetes    Increase glipizide to 20 mg daily.     Initiate sitagliptin.    Polypharmacy     Educated patient and son extensively about the importance of medication adherence.     To be completed at a future visit    Osteoporosis/osteopenia     Consider initiating anti-resorptive therapy in the future.     Consider first rechecking DEXA    Type 2 Diabetes      Reassess home BG levels.    Initiate metformin XR at 500mg QD at next visit and titrate.     Polypharmacy     Reassess medication adherence and reiterate importance     Health maintenance    Give PCV13 at clinic    Shingrix is also indicated; would need to get at a pharmacy    Options for treatment and/or follow-up care were reviewed with the patient.  Jenise was engaged and actively involved in the decision making process, verbalized understanding of the options discussed, and was satisfied with the final plan.    Follow-up                                                       Patient should follow up with Dr. Cortez.  Patient was provided with written instructions/medication list via AVS.     Dr. Cortez was provided the recommendations above  in clinic today and Dr. Cortez was available for supervision during this visit and is the authorizing prescriber for this visit through the pharmacist collaborative practice agreement.    Yaneth Araujo, PharmD Student    Drug therapy problems identified  1. Med: aspirin - Indication - needs therapy - Resolution: Intiate Drug; resolved  2. Med: metoprolol - Indication - needs additional therapy - Resolution: Intiate Drug; resolved   3. Med: lisinopril - Indication - needs additional therapy - Resolution: Intiate Drug; resolved  4. Med: glipizide - Efficacy  - dose too low - Resolution: Change dose;  resolved  5.   Med: sitagliptin - Indication - needs additional drug therapy - Resolution: Intiate Drug; resolved  6.   Med: metformin - Indication - needs additional drug therapy - Resolution: Intiate Drug; deferred to future visit    # of medical conditions addressed: 3  # of medications addressed: 11  # of medication discrepancies identified: 6  # of DTP identified: 6  Time spent: 30 minutes  Level of service: 4    The student acted as scribe and the encounter documented was completely performed by myself. I have reviewed and verified the student s documentation and found it to be correct and complete.  Beth De Leon, Pharm.D.

## 2018-03-22 NOTE — PROGRESS NOTES
There are no exam notes on file for this visit.  Chief Complaint   Patient presents with     Recheck Medication     Medication Reconciliation     Pharm D to reconcile     Blood pressure 135/79, pulse 87, weight 79.9 kg (176 lb 3.2 oz).                 SUZAN Paz is a 71 year old  female with a PMH significant for:     Patient Active Problem List   Diagnosis     Abnormal Pap smear of cervix     Cervical spondylosis     PTSD (post-traumatic stress disorder)     Hyperlipemia     Hypertension     Low back pain Chronic     DDD (degenerative disc disease), lumbar     Muscle Aches, Generalized (Myalgia)     Thyroid nodule     History of subtotal thyroidectomy     Vitamin D deficiency     Seasonal allergies     Noncompliance with medication regimen     Type 2 diabetes mellitus without complication, without long-term current use of insulin (H)     Coronary artery disease involving native coronary artery of native heart with unstable angina pectoris (H)     Anemia, iron deficiency     S/P CABG (coronary artery bypass graft)     She presents with follow-up of diabetes, heart disease, review medications.    Last visit we rechecked labs and her diabetes is out of control.  Is unclear what medications she was taking at that time.  She brought her medications today and we are able to review those Pharm.D. helped with this today see their note for further details.  It is difficult for her to discuss her meal she states she does not have a lot of appetite and would like to just drink a liquid supplement instead of eating food.  I discussed that this is the not recommended and is likely not to be covered in her case.  Her and her son did accept some counseling on food choices to help with diabetes control.  Seems that her only diabetes medication is glipizide 2.5 mg daily did not have metformin with her.    Had coronary artery bypass surgery 2016.  Did not have several of her cardiac meds with her today.  Including aspirin  lisinopril and metoprolol.  Discussed the importance of these medications for preventing heart disease and she agrees to take them.  Denies any current chest pain although does have pain over her scar.    PMH, Medications and Allergies were reviewed and updated as needed.     [employer]  was used for this visit           Physical Exam:     Vitals:    03/20/18 1429   BP: 135/79   Pulse: 87   Weight: 79.9 kg (176 lb 3.2 oz)     Body mass index is 36.2 kg/(m^2).    Exam:  Constitutional: healthy, alert and no distress  Psychiatric: mentation appears normal and affect normal/bright        Results for orders placed or performed in visit on 03/12/18   Hemoglobin A1c ([facility])   Result Value Ref Range    Hemoglobin A1C 10.8 (H) 4.1 - 5.7 %   CBC with Plt ([facility])   Result Value Ref Range    WBC 4.8 4.0 - 11.0 K/uL    RBC 5.6 (H) 3.8 - 5.2 M/uL    Hemoglobin 11.4 (L) 11.7 - 15.7 g/dL    Hematocrit 39.7 35.0 - 47.0 %    MCV 71.1 (L) 78.0 - 100.0 fL    MCH 20.4 (L) 26.5 - 35.0    MCHC 28.7 (L) 32.0 - 36.0 g/dL    Platelets 224.0 150.0 - 450.0 K/uL   [facility]   Result Value Ref Range    Sodium 137 136 - 145 mmol/L    Potassium 4.0 3.5 - 5.0 mmol/L    Chloride 104 98 - 107 mmol/L    CO2, Total 24 22 - 31 mmol/L    Anion Gap 9 5 - 18 mmol/L    Glucose 356 (H) 70 - 125 mg/dL    Calcium 8.4 (L) 8.5 - 10.5 mg/dL    Urea Nitrogen 9 8 - 22 mg/dL    Creatinine 0.71 0.60 - 1.10 mg/dL    GFR Estimate If Black >60 >60 mL/min/1.73m2    GFR Estimate >60 >60 mL/min/1.73m2    Narrative    Test performed by:  [facility]  [address] [place] [address]  Fasting Glucose reference range is 70-99 mg/dL per  American Diabetes Association (ADA) guidelines.   Lipid [place] ([facility]) - Results > 1 hr   Result Value Ref Range    Cholesterol 185 <=199 mg/dL    Triglycerides 108 <=149 mg/dL    HDL Cholesterol 43 (L) >=50 mg/dL    LDL Cholesterol Calculated 120 <=129 mg/dL    Fasting? Unknown      Narrative    [facility]  [facility]  [address] [place] [address]       Assessment and Plan     Jenise was seen today for recheck medication and medication reconciliation.    Diagnoses and all orders for this visit:    Coronary artery disease involving native coronary artery of native heart with unstable angina pectoris (H): Restarted lisinopril aspirin metoprolol.  Continue statin.  Refilled nitroglycerin.  Will need close follow-up of blood pressure and kidney function.  -     lisinopril (PRINIVIL/ZESTRIL) 2.5 MG tablet; Take 1 tablet (2.5 mg) by mouth daily  -     aspirin 81 MG tablet; Take 1 tablet daily  -     nitroGLYcerin (NITROSTAT) 0.4 MG sublingual tablet; Place 1 tablet (0.4 mg) under the tongue every 5 minutes as needed for chest pain  -     metoprolol succinate (TOPROL-XL) 25 MG 24 hr tablet; Take 1 tablet (25 mg) by mouth daily    Type 2 diabetes mellitus without complication, without long-term current use of insulin (H): Poor control at this time.  Due to history of difficulty remembering and taking many medications.  We decided to go with glipizide and Januvia as a starting point for her diabetes.  Mention to her that she may need insulin in the future.  Had also started multiple new cardiac meds today so having her take 4 pills of metformin and titrating it up was unlikely to go well.  Plan to discuss titrating up metformin at next visit.  Gave handouts on healthy eating and diabetic diet for her and her family to review plan to have RNs do more formal counseling at next visit versus sending her to formal diabetes education.  -     glipiZIDE (GLUCOTROL XL) 10 MG 24 hr tablet; Take 2 tablets (20 mg) by mouth daily  -     sitagliptin (JANUVIA) 100 MG tablet; Take 1 tablet (100 mg) by mouth daily    Patient Instructions   Restart lisinopril 1 pill once daily  Restart metoprolol 1 pill once daily  Restart aspirin 1 pill once daily  Increase Glipizide to new strength (10mg)- 2  pills once daily  Start Januvia 100mg once daily  Fill new nitroglycerin to put under tongue when you have chest pain.    Stop the Metformin for now.          Return in about 1 month (around 4/20/2018).     Options for treatment and/or follow-up care were reviewed with the patient. Jenise Paz was engaged and actively involved in the decision making process. She verbalized understanding of the options discussed and was satisfied with the final plan.    Afia Cortez MD

## 2018-04-03 ENCOUNTER — OFFICE VISIT - HEALTHEAST (OUTPATIENT)
Dept: PHYSICAL THERAPY | Facility: REHABILITATION | Age: 73
End: 2018-04-03

## 2018-04-03 DIAGNOSIS — R26.81 UNSTEADINESS ON FEET: ICD-10-CM

## 2018-04-03 DIAGNOSIS — M62.81 GENERALIZED MUSCLE WEAKNESS: ICD-10-CM

## 2018-04-03 DIAGNOSIS — R26.89 ANTALGIC GAIT: ICD-10-CM

## 2018-04-03 DIAGNOSIS — M54.16 LUMBAR RADICULOPATHY: ICD-10-CM

## 2018-04-24 ENCOUNTER — TRANSFERRED RECORDS (OUTPATIENT)
Dept: HEALTH INFORMATION MANAGEMENT | Facility: CLINIC | Age: 73
End: 2018-04-24

## 2018-04-25 ENCOUNTER — SURGERY - HEALTHEAST (OUTPATIENT)
Dept: SURGERY | Facility: HOSPITAL | Age: 73
End: 2018-04-25

## 2018-04-25 ENCOUNTER — ANESTHESIA - HEALTHEAST (OUTPATIENT)
Dept: SURGERY | Facility: HOSPITAL | Age: 73
End: 2018-04-25

## 2018-04-25 ENCOUNTER — TRANSFERRED RECORDS (OUTPATIENT)
Dept: HEALTH INFORMATION MANAGEMENT | Facility: CLINIC | Age: 73
End: 2018-04-25

## 2018-04-26 ENCOUNTER — TRANSFERRED RECORDS (OUTPATIENT)
Dept: HEALTH INFORMATION MANAGEMENT | Facility: CLINIC | Age: 73
End: 2018-04-26

## 2018-05-11 ENCOUNTER — TELEPHONE (OUTPATIENT)
Dept: FAMILY MEDICINE | Facility: CLINIC | Age: 73
End: 2018-05-11

## 2018-05-11 NOTE — TELEPHONE ENCOUNTER
[facility] phone call message- general phone call:    Reason for call: refill on her test strips.    Return call needed: Yes    OK to leave a message on voice mail? Yes    Primary language: Hmong      needed? Yes    Call taken on May 11, 2018 at 4:32 PM by Theresa Andrew

## 2018-05-14 DIAGNOSIS — E11.9 TYPE 2 DIABETES MELLITUS WITHOUT COMPLICATION, WITHOUT LONG-TERM CURRENT USE OF INSULIN (H): ICD-10-CM

## 2018-05-15 DIAGNOSIS — M54.42 CHRONIC LEFT-SIDED LOW BACK PAIN WITH LEFT-SIDED SCIATICA: ICD-10-CM

## 2018-05-15 DIAGNOSIS — G89.29 CHRONIC LEFT-SIDED LOW BACK PAIN WITH LEFT-SIDED SCIATICA: ICD-10-CM

## 2018-05-15 RX ORDER — GABAPENTIN 300 MG/1
CAPSULE ORAL
Qty: 90 CAPSULE | Refills: 1 | Status: SHIPPED | OUTPATIENT
Start: 2018-05-15 | End: 2018-09-24

## 2018-05-17 DIAGNOSIS — E11.9 TYPE 2 DIABETES MELLITUS WITHOUT COMPLICATION, WITHOUT LONG-TERM CURRENT USE OF INSULIN (H): ICD-10-CM

## 2018-09-24 DIAGNOSIS — M54.42 CHRONIC LEFT-SIDED LOW BACK PAIN WITH LEFT-SIDED SCIATICA: ICD-10-CM

## 2018-09-24 DIAGNOSIS — G89.29 CHRONIC LEFT-SIDED LOW BACK PAIN WITH LEFT-SIDED SCIATICA: ICD-10-CM

## 2018-09-24 RX ORDER — GABAPENTIN 300 MG/1
CAPSULE ORAL
Qty: 90 CAPSULE | Refills: 1 | Status: SHIPPED | OUTPATIENT
Start: 2018-09-24 | End: 2019-01-08

## 2018-10-23 ENCOUNTER — OFFICE VISIT (OUTPATIENT)
Dept: FAMILY MEDICINE | Facility: CLINIC | Age: 73
End: 2018-10-23
Payer: COMMERCIAL

## 2018-10-23 VITALS
SYSTOLIC BLOOD PRESSURE: 106 MMHG | TEMPERATURE: 98.2 F | WEIGHT: 172.4 LBS | RESPIRATION RATE: 16 BRPM | OXYGEN SATURATION: 97 % | BODY MASS INDEX: 35.42 KG/M2 | HEART RATE: 69 BPM | DIASTOLIC BLOOD PRESSURE: 67 MMHG

## 2018-10-23 DIAGNOSIS — D50.8 IRON DEFICIENCY ANEMIA SECONDARY TO INADEQUATE DIETARY IRON INTAKE: ICD-10-CM

## 2018-10-23 DIAGNOSIS — E11.9 TYPE 2 DIABETES MELLITUS WITHOUT COMPLICATION, WITHOUT LONG-TERM CURRENT USE OF INSULIN (H): ICD-10-CM

## 2018-10-23 DIAGNOSIS — E04.1 THYROID NODULE: ICD-10-CM

## 2018-10-23 DIAGNOSIS — Z23 NEED FOR VACCINATION: ICD-10-CM

## 2018-10-23 DIAGNOSIS — Z12.11 SPECIAL SCREENING FOR MALIGNANT NEOPLASMS, COLON: ICD-10-CM

## 2018-10-23 DIAGNOSIS — I10 ESSENTIAL HYPERTENSION: ICD-10-CM

## 2018-10-23 DIAGNOSIS — Z23 NEED FOR IMMUNIZATION AGAINST INFLUENZA: Primary | ICD-10-CM

## 2018-10-23 DIAGNOSIS — E78.2 MIXED HYPERLIPIDEMIA: Primary | ICD-10-CM

## 2018-10-23 DIAGNOSIS — I25.110 CORONARY ARTERY DISEASE INVOLVING NATIVE CORONARY ARTERY OF NATIVE HEART WITH UNSTABLE ANGINA PECTORIS (H): ICD-10-CM

## 2018-10-23 LAB
% GRANULOCYTES: 63.5 %G (ref 40–75)
ANION GAP SERPL CALCULATED.3IONS-SCNC: 8 MMOL/L (ref 5–18)
BUN SERPL-MCNC: 9 MG/DL (ref 8–28)
CALCIUM SERPL-MCNC: 9.4 MG/DL (ref 8.5–10.5)
CHLORIDE SERPL-SCNC: 105 MMOL/L (ref 98–107)
CO2 SERPL-SCNC: 29 MMOL/L (ref 22–31)
CREAT SERPL-MCNC: 0.78 MG/DL (ref 0.6–1.1)
CREAT UR-MCNC: 246 MG/DL
FERRITIN SERPL-MCNC: 104 NG/ML (ref 10–130)
GLUCOSE SERPL-MCNC: 108 MG/DL (ref 70–125)
GRANULOCYTES #: 4.1 K/UL (ref 1.6–8.3)
HBA1C MFR BLD: 12.2 % (ref 4.1–5.7)
HCT VFR BLD AUTO: 41.1 % (ref 35–47)
HEMOGLOBIN: 12.7 G/DL (ref 11.7–15.7)
LYMPHOCYTES # BLD AUTO: 1.9 K/UL (ref 0.8–5.3)
LYMPHOCYTES NFR BLD AUTO: 29.9 %L (ref 20–48)
MCH RBC QN AUTO: 21.8 PG (ref 26.5–35)
MCHC RBC AUTO-ENTMCNC: 30.9 G/DL (ref 32–36)
MCV RBC AUTO: 70.7 FL (ref 78–100)
MICROALBUMIN UR-MCNC: 8.54 MG/DL (ref 0–1.99)
MICROALBUMIN/CREAT UR: 34.7 MG/G
MID #: 0.4 K/UL (ref 0–2.2)
MID %: 6.6 %M (ref 0–20)
PLATELET # BLD AUTO: 239 K/UL (ref 150–450)
POTASSIUM SERPL-SCNC: 4 MMOL/L (ref 3.5–5)
RBC # BLD AUTO: 5.8 M/UL (ref 3.8–5.2)
SODIUM SERPL-SCNC: 142 MMOL/L (ref 136–145)
TSH SERPL DL<=0.05 MIU/L-ACNC: 1.14 UIU/ML (ref 0.3–5)
WBC # BLD AUTO: 6.5 K/UL (ref 4–11)

## 2018-10-23 NOTE — MR AVS SNAPSHOT
After Visit Summary   10/23/2018    Jenise Paz    MRN: [id number]           Patient Information     Date Of Birth          1946        Visit Information        Provider Department      10/23/2018 4:10 PM Malini Burt MD [facility]        Today's Diagnoses     Need for immunization against influenza    -  1    Type 2 diabetes mellitus without complication, without long-term current use of insulin (H)        Essential hypertension        Thyroid nodule        Iron deficiency anemia secondary to inadequate dietary iron intake        Need for vaccination          Care Instructions    We will check on the medicines that you are taking with the pharmacy and your daughter in law.      Come back in 2-3 weeks to review medications with Dr. Cortez.  Bring all of your medications with you at that time.      Good work on the weight loss.            Follow-ups after your visit        Who to contact     Please call your clinic at [phone] to:    Ask questions about your health    Make or cancel appointments    Discuss your medicines    Learn about your test results    Speak to your doctor            Additional Information About Your Visit        MyChart Information     [employer] is an electronic gateway that provides easy, online access to your medical records. With [employer], you can request a clinic appointment, read your test results, renew a prescription or communicate with your care team.     To sign up for [employer] visit the website at [email or web address]   You will be asked to enter the access code listed below, as well as some personal information. Please follow the directions to create your username and password.     Your access code is: [email or web address]  Expires: 2019  5:05 PM     Your access code will  in 90 days. If you need help or a new code, please contact your [facility] or call [phone] for assistance.        Care EveryWhere ID     This  is your Care EveryWhere ID. This could be used by other organizations to access your [place] medical records  [id number]        Your Vitals Were     Pulse Temperature Respirations Pulse Oximetry BMI (Body Mass Index)       69 98.2  F (36.8  C) (Oral) 16 97% 35.42 kg/m2        Blood Pressure from Last 3 Encounters:   10/23/18 106/67   03/20/18 135/79   03/12/18 144/83    Weight from Last 3 Encounters:   10/23/18 172 lb 6.4 oz (78.2 kg)   03/20/18 176 lb 3.2 oz (79.9 kg)   03/12/18 177 lb 12.8 oz (80.6 kg)              We Performed the Following     ADMIN VACCINE, EACH ADDITIONAL     ADMIN VACCINE, INITIAL     Basic Metabolic Profile ([facility])     CBC with Diff Plt ([facility])     Ferritin ([facility])     FLU VAC QUADRIVLENT SPLIT VIRUS IM 0.5ml dosage     Hemoglobin A1c ([facility])     Microalbumin Creatinine Ratio Random Ur ([facility])     Pneumococcal vaccine 13 valent PCV13 IM (Prevnar) [54141]     TSH  Sensitive ([facility])        Primary Care Provider Office Phone # Fax #    Afia Cortez [phone]       [address] [place] [address]        Equal Access to Services     ERICKA FRAIRE : Hadii aad ku hadasho Soomaali, waaxda luqadaha, qaybta kaalmada adeegyada, betty samayoa. So [facility] [phone].    ATENCIÓN: Si habla español, tiene a alvarez disposición servicios gratuitos de asistencia lingüística. Llame al [phone].    We comply with applicable federal civil rights laws and [place] laws. We do not discriminate on the basis of race, color, national origin, age, disability, sex, sexual orientation, or gender identity.            Thank you!     Thank you for choosing [facility]  for your care. Our goal is always to provide you with excellent care. Hearing back from our patients is one way we can continue to improve our services. Please take a few minutes to complete the written survey that you may receive in the mail after your visit with us. Thank  you!             Your Updated Medication List - Protect others around you: Learn how to safely use, store and throw away your medicines at www.disposemymeds.org.          This list is accurate as of 10/23/18  5:05 PM.  Always use your most recent med list.                   Brand Name Dispense Instructions for use Diagnosis    acetaminophen 500 MG tablet    TYLENOL    200 tablet    Take 2 tablets (1,000 mg) by mouth 3 times daily as needed    Chronic left-sided low back pain with left-sided sciatica       aspirin 81 MG tablet     90 tablet    Take 1 tablet daily    Coronary artery disease involving native coronary artery of native heart with unstable angina pectoris (H)       atorvastatin 80 MG tablet    LIPITOR    90 tablet    Take 1 tablet (80 mg) by mouth daily    Hypercholesteremia       azelastine 0.05 % ophthalmic solution    OPTIVAR    3 Bottle    Place 1 drop into both eyes 2 times daily    Seasonal allergic rhinitis due to other allergic trigger       blood glucose monitoring lancets      Test once daily        blood glucose monitoring test strip    LAURA CONTOUR    1 Box    Use to test blood sugars 1 times daily or as directed.    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-sided sciatica       glipiZIDE 10 MG 24 hr tablet    glipiZIDE XL    180 tablet    Take 2 tablets (20 mg) by mouth daily    Type 2 diabetes mellitus without complication, without long-term current use of insulin (H)       isosorbide mononitrate 30 MG 24 hr tablet    IMDUR    90 tablet    Take 0.5 tablets (15 mg) by mouth daily    Coronary artery disease involving native heart with angina pectoris, unspecified vessel or lesion type (H)       lisinopril 2.5 MG tablet     PRINIVIL/Zestril    90 tablet    Take 1 tablet (2.5 mg) by mouth daily    Coronary artery disease involving native coronary artery of native heart with unstable angina pectoris (H)       loratadine 10 MG tablet    CLARITIN    90 tablet    Take 1 tablet (10 mg) by mouth daily as needed for allergies    Chronic seasonal allergic rhinitis, unspecified trigger       metoprolol succinate 25 MG 24 hr tablet    TOPROL-XL    90 tablet    Take 1 tablet (25 mg) by mouth daily    Coronary artery disease involving native heart with angina pectoris, unspecified vessel or lesion type (H)       nitroGLYcerin 0.4 MG sublingual tablet    NITROSTAT    25 tablet    Place 1 tablet (0.4 mg) under the tongue every 5 minutes as needed for chest pain    Coronary artery disease involving native coronary artery of native heart with unstable angina pectoris (H)       sennosides 8.6 MG tablet    SENOKOT    60 tablet    Take 1-2 tablets by mouth daily as needed for constipation    Other constipation       sitagliptin 100 MG tablet    JANUVIA    90 tablet    Take 1 tablet (100 mg) by mouth daily    Type 2 diabetes mellitus without complication, without long-term current use of insulin (H)

## 2018-10-23 NOTE — LETTER
October 24, 2018      Jenise Paz  [address] [facility] [address]        Dear Jenise,  Here is a copy of your lab results.  Your kidneys are starting to lose protein, but improving your diabetes will help that.  Your hemoglobin and iron levels are good.  Your kidney blood tests look okay.  Your thyroid test is normal.  Your A1c is getting higher.  I hope we can help clear up your medication so your sugars come down, as I think this might be a the cause of your dizziness.  Make sure to schedule a follow up with Dr. Cortez and bring in all of your medications.  Please call the clinic at [phone] if you have any questions.     Please see below for your test results.    Resulted Orders   Hemoglobin A1c ([facility])   Result Value Ref Range    Hemoglobin A1C 12.2 (H) 4.1 - 5.7 %   Microalbumin Creatinine Ratio Random Ur ([facility])   Result Value Ref Range    Microalbumin, Urine 8.54 (H) 0.00 - 1.99 mg/dL    Creatinine, Urine 246.0 mg/dL    Albumin Urine mg/g Cr 34.7 (H) <=19.9 mg/g    Narrative    Test performed by:  [facility]  [address] [place] [address]  Microalbumin, Random Urine  <2.0 mg/dL . . . . . . . . Normal  3.0-30.0 mg/dL . . . . . . Microalbuminuria  >30.0 mg/dL . . . . . .  . Clinical Proteinuria  Microalbumin/Creatinine Ratio, Random Urine  <20 mg/g . . . . .. . . . Normal   mg/g . . . . . . . Microalbuminuria  >300 mg/g . . . . . . . . Clinical Proteinuria   TSH  Sensitive ([facility])   Result Value Ref Range    TSH 1.14 0.30 - 5.00 uIU/mL    Narrative    Test performed by:  [facility]  [address] [place] [address]   CBC with Diff Plt ([facility])   Result Value Ref Range    WBC 6.5 4.0 - 11.0 K/uL    Lymphocytes # 1.9 0.8 - 5.3 K/uL    % Lymphocytes 29.9 20.0 - 48.0 %L    Mid # 0.4 0.0 - 2.2 K/uL    Mid % 6.6 0.0 - 20.0 %M    GRANULOCYTES # 4.1 1.6 - 8.3 K/uL    % Granulocytes 63.5 40.0 - 75.0 %G    RBC 5.8 (H) 3.8 - 5.2 M/uL    Hemoglobin 12.7 11.7 -  15.7 g/dL    Hematocrit 41.1 35.0 - 47.0 %    MCV 70.7 (L) 78.0 - 100.0 fL    MCH 21.8 (L) 26.5 - 35.0    MCHC 30.9 (L) 32.0 - 36.0 g/dL    Platelets 239.0 150.0 - 450.0 K/uL   Ferritin ([facility])   Result Value Ref Range    Ferritin 104 10 - 130 ng/mL    Narrative    Test performed by:  [facility]  [address] [place] [address]   Basic Metabolic Profile ([facility])   Result Value Ref Range    Sodium 142 136 - 145 mmol/L    Potassium 4.0 3.5 - 5.0 mmol/L    Chloride 105 98 - 107 mmol/L    CO2, Total 29 22 - 31 mmol/L    Anion Gap 8 5 - 18 mmol/L    Glucose 108 70 - 125 mg/dL    Calcium 9.4 8.5 - 10.5 mg/dL    Urea Nitrogen 9 8 - 28 mg/dL    Creatinine 0.78 0.60 - 1.10 mg/dL    GFR Estimate If Black >60 >60 mL/min/1.73m2    GFR Estimate >60 >60 mL/min/1.73m2    Narrative    Test performed by:  [facility] [address] [place] [address]  Fasting Glucose reference range is 70-99 mg/dL per  American Diabetes Association (ADA) guidelines.       If you have any questions, please call the clinic to make an appointment.    Sincerely,    Malini Burt MD

## 2018-10-23 NOTE — PATIENT INSTRUCTIONS
We will check on the medicines that you are taking with the pharmacy and your daughter in law.      Come back in 2-3 weeks to review medications with Dr. oCrtez.  Bring all of your medications with you at that time.      Good work on the weight loss.

## 2018-10-23 NOTE — NURSING NOTE
Due to patient being non-English speaking/uses sign language, an  was used for this visit. Only for face-to-face interpretation by an external agency, date and length of interpretation can be found on the scanned worksheet.     name: Ish  Agency: [employer]  Language: Jennifer   Telephone number: [phone]  Type of interpretation: Telemedicine, spoken    Injectable influenza vaccine documentation    1. Has the patient received the information for the influenza vaccine? YES    2. Does the patient have a severe allergy to eggs (Patients with a severe egg allergy should be assessed by a medical provider, RN, or clinical pharmacist. If they receive the influenza vaccine, please have them observed for 15 minutes.)? No    3. Has the patient had an allergic reaction to previous influenza vaccines? No    4. Has the patient had any severe allergic reactions to past influenza vaccines ? No       5. Does patient have a history of Guillain-[place] syndrome? No      Based on responses above, I administered the influenza vaccine.  Zainab Sen, CMA

## 2018-10-24 NOTE — PROGRESS NOTES
Nursing Notes:   Zainab Sen CMA  10/23/2018  4:18 PM  Signed  Due to patient being non-English speaking/uses sign language, an  was used for this visit. Only for face-to-face interpretation by an external agency, date and length of interpretation can be found on the scanned worksheet.     name: Ish  Agency: [employer]  Language: Jennifer   Telephone number: [phone]  Type of interpretation: Telemedicine, spoken    Injectable influenza vaccine documentation    1. Has the patient received the information for the influenza vaccine? YES    2. Does the patient have a severe allergy to eggs (Patients with a severe egg allergy should be assessed by a medical provider, RN, or clinical pharmacist. If they receive the influenza vaccine, please have them observed for 15 minutes.)? No    3. Has the patient had an allergic reaction to previous influenza vaccines? No    4. Has the patient had any severe allergic reactions to past influenza vaccines ? No       5. Does patient have a history of Guillain-[place] syndrome? No      Based on responses above, I administered the influenza vaccine.  Zainab Sen CMA      PAOLA Paz is a 72 year old female with past medical history significant for    Patient Active Problem List   Diagnosis     Abnormal Pap smear of cervix     Cervical spondylosis     PTSD (post-traumatic stress disorder)     Hyperlipemia     Hypertension     Low back pain Chronic     DDD (degenerative disc disease), lumbar     Muscle Aches, Generalized (Myalgia)     Thyroid nodule     History of subtotal thyroidectomy     Vitamin D deficiency     Seasonal allergies     Noncompliance with medication regimen     Type 2 diabetes mellitus without complication, without long-term current use of insulin (H)     Coronary artery disease involving native coronary artery of native heart with unstable angina pectoris (H)     Anemia, iron deficiency     S/P CABG  (coronary artery bypass graft)     Others present at the visit:  Jennifer interpretation through Angela.      Presents for   Chief Complaint   Patient presents with     Flu Shot     Pt is here for her flu shot.     Imm/Inj     Pt will take her pneumonia shot today.     Colonoscopy     Pt will take home a fit test.     Radiology Visit     Pt states she has never had a mammogram,      Medication Reconciliation     Complete.      Patient shares that she is here today because she is due for lab tests and needs updates on her shots.  She otherwise has no concerns.  Shares that she feels good.  Endorses taking her medications, taking 3-4 together in the morning which are given to her in a bowl, and 2 at night.  She shares that her daughter-in-law helps with her medications and she does not know which medications she is taking.  She denies any side effects currently.  She has a history of coronary artery disease with a previous bypass.  She denies    Any chest pain.  Does get discomfort in the area of her previous surgical scar when the weather changes.  Otherwise it is not painful.  She gets some mild shortness of breath with periods of exertion but this goes away when she sits down.  She is able to walk as much as she wants and has no concerns about this.  Takes breaks when she needs to.  Denies swelling in her legs.      She does endorse some dizziness and lightheadedness.  This happens when she stands up fast.  She will have to hold on tight her cane and  place for short period of time.  She denies any recent falls.  Reports that she is eating and drinking well.  Tries to eat healthy and exercise regularly.  She has decreased her overall rice intake.  Has been exercising as well, and walks outside.  When the weather gets bad her daughters will take her to a store to walk.  She feels good when she is walking regularly.      Has a history of diabetes.  She denies any vision changes, no increased urinary frequency,  "no polydipsia.  She checks her blood sugars occasionally at home.  Says that they are usually in the 250s.  Shares that she drinks a lot of water and a lot of fruit.  Tells me these are healthy choices.  Has been taking care of her feet and massages them or has a family member member massage them regularly.  She does get some occasional pain and numbness in her feet, and takes gabapentin for this.  Finds it helpful.  She endorses \"taking good care of herself \"she does endorse sometimes forgetting her medication and may skip a day.  She is okay with us calling her daughter-in-law or the pharmacy to check on medications.    OBJECTIVE:  Vitals: /67 (BP Location: Left arm, Patient Position: Sitting, Cuff Size: Adult Large)  Pulse 69  Temp 98.2  F (36.8  C) (Oral)  Resp 16  Wt 172 lb 6.4 oz (78.2 kg)  SpO2 97%  BMI 35.42 kg/m2  BMI= Body mass index is 35.42 kg/(m^2).  Objective:    Vitals:  Vitals are reviewed and are within the normal range  Gen:  Alert, pleasant, no acute distress  Cardiac:  Regular rate and rhythm, no murmurs, rubs or gallops  Respiratory:  Lungs clear to auscultation bilaterally  Chest: Has previous open heart surgery scar with extensive keloid formation over the top.  It is mildly tender with palpation.  No extending erythema or evidence of infection.  Abdomen:  Soft, non-tender, non-distended, bowel sounds positive  Extremities:  Warm, well-perfused, pulses 2+/4, trace lower extremity edema.  Monofilament testing was completed and was normal.    Results for orders placed or performed in visit on 10/23/18   Hemoglobin A1c ([facility])   Result Value Ref Range    Hemoglobin A1C 12.2 (H) 4.1 - 5.7 %   Microalbumin Creatinine Ratio Random Ur ([facility])   Result Value Ref Range    Microalbumin, Urine 8.54 (H) 0.00 - 1.99 mg/dL    Creatinine, Urine 246.0 mg/dL    Albumin Urine mg/g Cr 34.7 (H) <=19.9 mg/g    Narrative    Test performed by:  [facility]  [address] [place] [address]  Microalbumin, Random Urine  <2.0 mg/dL . . . . . . . . Normal  3.0-30.0 mg/dL . . . . . . Microalbuminuria  >30.0 mg/dL . . . . . .  . Clinical Proteinuria  Microalbumin/Creatinine Ratio, Random Urine  <20 mg/g . . . . .. . . . Normal   mg/g . . . . . . . Microalbuminuria  >300 mg/g . . . . . . . . Clinical Proteinuria   TSH  Sensitive ([facility])   Result Value Ref Range    TSH 1.14 0.30 - 5.00 uIU/mL    Narrative    Test performed by:  [facility]  [address] [place] [address]   CBC with Diff Plt ([facility])   Result Value Ref Range    WBC 6.5 4.0 - 11.0 K/uL    Lymphocytes # 1.9 0.8 - 5.3 K/uL    % Lymphocytes 29.9 20.0 - 48.0 %L    Mid # 0.4 0.0 - 2.2 K/uL    Mid % 6.6 0.0 - 20.0 %M    GRANULOCYTES # 4.1 1.6 - 8.3 K/uL    % Granulocytes 63.5 40.0 - 75.0 %G    RBC 5.8 (H) 3.8 - 5.2 M/uL    Hemoglobin 12.7 11.7 - 15.7 g/dL    Hematocrit 41.1 35.0 - 47.0 %    MCV 70.7 (L) 78.0 - 100.0 fL    MCH 21.8 (L) 26.5 - 35.0    MCHC 30.9 (L) 32.0 - 36.0 g/dL    Platelets 239.0 150.0 - 450.0 K/uL   Ferritin ([facility])   Result Value Ref Range    Ferritin 104 10 - 130 ng/mL    Narrative    Test performed by:  [facility] [address] [place] [address]   Basic Metabolic Profile ([facility])   Result Value Ref Range    Sodium 142 136 - 145 mmol/L    Potassium 4.0 3.5 - 5.0 mmol/L    Chloride 105 98 - 107 mmol/L    CO2, Total 29 22 - 31 mmol/L    Anion Gap 8 5 - 18 mmol/L    Glucose 108 70 - 125 mg/dL    Calcium 9.4 8.5 - 10.5 mg/dL    Urea Nitrogen 9 8 - 28 mg/dL    Creatinine 0.78 0.60 - 1.10 mg/dL    GFR Estimate If Black >60 >60 mL/min/1.73m2    GFR Estimate >60 >60 mL/min/1.73m2    Narrative    Test performed by:  [facility]  [address] [place] [address]  Fasting Glucose reference range is 70-99 mg/dL per  American Diabetes Association (ADA) guidelines.           ASSESSMENT AND PLAN:      Jenise was seen today for flu shot, imm/inj, colonoscopy,  radiology visit and medication reconciliation.  Patient reports being here for labs and shots.  No other concerns.  She has a number of chronic medical problems, but does not see them as a concern because she is feeling well.  I am unable to determine what medications she is taking, but she gave me permission to chat with her daughter-in-law or with the pharmacy about these.  I will see if our deformities can call and confirm with capital which medication she is receiving regularly.    Diagnoses and all orders for this visit:    Need for immunization against influenza.  Flu shot given today.  -     FLU VAC QUADRIVLENT SPLIT VIRUS IM 0.5ml dosage  -     ADMIN VACCINE, INITIAL    Type 2 diabetes mellitus without complication, without long-term current use of insulin (H).  Discussed her elevated A1c.  She continues to endorse that she takes good care of herself and takes her medications.  I am reluctant to add any medications given the fact that it is unclear how many of her oral diabetes medicines she is currently taking.  I do think this is causing some of her dizziness and discussed this with her.  -     Hemoglobin A1c ([facility])  -     Microalbumin Creatinine Ratio Random Ur ([facility])    Essential hypertension.  Blood pressure well controlled.  Electrolytes normal.  Kidney function stable.  -     Cancel: Basic Metabolic Panel ([place])  -     Basic Metabolic Profile ([facility])    Thyroid nodule normal TSH..    -     TSH  Sensitive ([facility])    Iron deficiency anemia secondary to inadequate dietary iron intake.  Normal iron and ferritin.  Pneumococcal vaccination given.  -     CBC with Diff Plt (P )  -     Ferritin ([facility])    Need for vaccination.    -     ADMIN VACCINE, EACH ADDITIONAL  -     Pneumococcal vaccine 13 valent PCV13 IM (Prevnar) [11666]    Special screening for malignant neoplasms, colon she is okay with doing fit testing, so we will send her to the lab for instructions on this..    -      Fecal Occult Blood - FIT, iFOB ([email or web address]); Future    Patient Instructions   We will check on the medicines that you are taking with the pharmacy and your daughter in law.      Come back in 2-3 weeks to review medications with Dr. Cortez.  Bring all of your medications with you at that time.      Good work on the weight loss.        Follow up in 2-3 weeks with your medications to see Dr. Cortez for follow up diabetes.      Malini Burt

## 2018-10-24 NOTE — PROGRESS NOTES
Jenise Paz-    Here is a copy of your lab results.  Your kidneys are starting to lose protein, but improving your diabetes will help that.  Your hemoglobin and iron levels are good.  Your kidney blood tests look okay.  Your thyroid test is normal.  Your A1c is getting higher.  I hope we can help clear up your medication so your sugars come down, as I think this might be a the cause of your dizziness.  Make sure to schedule a follow up with Dr. Cortez and bring in all of your medications.  Please call the clinic at [phone] if you have any questions.      Malini Burt    Please send results to patient.

## 2019-01-08 ENCOUNTER — OFFICE VISIT (OUTPATIENT)
Dept: FAMILY MEDICINE | Facility: CLINIC | Age: 74
End: 2019-01-08
Payer: MEDICARE

## 2019-01-08 ENCOUNTER — OFFICE VISIT (OUTPATIENT)
Dept: PHARMACY | Facility: CLINIC | Age: 74
End: 2019-01-08
Payer: MEDICARE

## 2019-01-08 VITALS
TEMPERATURE: 98.8 F | OXYGEN SATURATION: 99 % | WEIGHT: 169.4 LBS | RESPIRATION RATE: 16 BRPM | SYSTOLIC BLOOD PRESSURE: 108 MMHG | HEART RATE: 71 BPM | BODY MASS INDEX: 34.8 KG/M2 | DIASTOLIC BLOOD PRESSURE: 67 MMHG

## 2019-01-08 DIAGNOSIS — M81.8 OTHER OSTEOPOROSIS, UNSPECIFIED PATHOLOGICAL FRACTURE PRESENCE: ICD-10-CM

## 2019-01-08 DIAGNOSIS — I10 ESSENTIAL HYPERTENSION: Primary | ICD-10-CM

## 2019-01-08 DIAGNOSIS — G89.29 CHRONIC LEFT-SIDED LOW BACK PAIN WITH LEFT-SIDED SCIATICA: ICD-10-CM

## 2019-01-08 DIAGNOSIS — M54.42 CHRONIC LEFT-SIDED LOW BACK PAIN WITH LEFT-SIDED SCIATICA: ICD-10-CM

## 2019-01-08 DIAGNOSIS — E11.9 TYPE 2 DIABETES MELLITUS WITHOUT COMPLICATION, WITHOUT LONG-TERM CURRENT USE OF INSULIN (H): ICD-10-CM

## 2019-01-08 DIAGNOSIS — E11.9 TYPE 2 DIABETES MELLITUS WITHOUT COMPLICATION, WITHOUT LONG-TERM CURRENT USE OF INSULIN (H): Primary | ICD-10-CM

## 2019-01-08 DIAGNOSIS — E78.00 HYPERCHOLESTEREMIA: ICD-10-CM

## 2019-01-08 DIAGNOSIS — I25.119 CORONARY ARTERY DISEASE INVOLVING NATIVE HEART WITH ANGINA PECTORIS, UNSPECIFIED VESSEL OR LESION TYPE (H): ICD-10-CM

## 2019-01-08 DIAGNOSIS — I25.110 CORONARY ARTERY DISEASE INVOLVING NATIVE CORONARY ARTERY OF NATIVE HEART WITH UNSTABLE ANGINA PECTORIS (H): ICD-10-CM

## 2019-01-08 DIAGNOSIS — J30.2 CHRONIC SEASONAL ALLERGIC RHINITIS: ICD-10-CM

## 2019-01-08 DIAGNOSIS — M85.80 OSTEOPENIA, UNSPECIFIED LOCATION: ICD-10-CM

## 2019-01-08 DIAGNOSIS — J30.89 SEASONAL ALLERGIC RHINITIS DUE TO OTHER ALLERGIC TRIGGER: ICD-10-CM

## 2019-01-08 LAB
BUN SERPL-MCNC: 19.9 MG/DL (ref 7–19)
CALCIUM SERPL-MCNC: 9.6 MG/DL (ref 8.5–10.1)
CHLORIDE SERPLBLD-SCNC: 98.7 MMOL/L (ref 98–110)
CO2 SERPL-SCNC: 31.7 MMOL/L (ref 20–32)
CREAT SERPL-MCNC: 0.9 MG/DL (ref 0.5–1)
GFR SERPL CREATININE-BSD FRML MDRD: 65.8 ML/MIN/1.7 M2
GLUCOSE SERPL-MCNC: 309.7 MG'DL (ref 70–99)
HBA1C MFR BLD: 14.5 % (ref 4.1–5.7)
POTASSIUM SERPL-SCNC: 4.2 MMOL/DL (ref 3.2–4.6)
SODIUM SERPL-SCNC: 135.8 MMOL/L (ref 132–142)

## 2019-01-08 RX ORDER — NITROGLYCERIN 0.4 MG/1
0.4 TABLET SUBLINGUAL EVERY 5 MIN PRN
Qty: 25 TABLET | Refills: 12 | Status: SHIPPED | OUTPATIENT
Start: 2019-01-08 | End: 2020-02-03

## 2019-01-08 RX ORDER — GABAPENTIN 300 MG/1
CAPSULE ORAL
Qty: 270 CAPSULE | Refills: 3 | Status: SHIPPED | OUTPATIENT
Start: 2019-01-08 | End: 2019-03-18

## 2019-01-08 RX ORDER — METOPROLOL SUCCINATE 25 MG/1
25 TABLET, EXTENDED RELEASE ORAL DAILY
Qty: 90 TABLET | Refills: 4 | Status: SHIPPED | OUTPATIENT
Start: 2019-01-08 | End: 2020-03-12

## 2019-01-08 RX ORDER — ACETAMINOPHEN 500 MG
1000 TABLET ORAL 3 TIMES DAILY PRN
Qty: 200 TABLET | Refills: 12 | Status: SHIPPED | OUTPATIENT
Start: 2019-01-08 | End: 2020-01-23

## 2019-01-08 RX ORDER — ISOSORBIDE MONONITRATE 30 MG/1
15 TABLET, EXTENDED RELEASE ORAL DAILY
Qty: 90 TABLET | Refills: 4 | Status: SHIPPED | OUTPATIENT
Start: 2019-01-08 | End: 2020-03-12

## 2019-01-08 RX ORDER — ATORVASTATIN CALCIUM 80 MG/1
80 TABLET, FILM COATED ORAL DAILY
Qty: 90 TABLET | Refills: 3 | Status: SHIPPED | OUTPATIENT
Start: 2019-01-08 | End: 2020-03-12

## 2019-01-08 RX ORDER — LISINOPRIL 2.5 MG/1
2.5 TABLET ORAL DAILY
Qty: 90 TABLET | Refills: 3 | Status: SHIPPED | OUTPATIENT
Start: 2019-01-08 | End: 2019-11-21

## 2019-01-08 RX ORDER — GLIPIZIDE 10 MG/1
20 TABLET, FILM COATED, EXTENDED RELEASE ORAL DAILY
Qty: 180 TABLET | Refills: 4 | Status: SHIPPED | OUTPATIENT
Start: 2019-01-08 | End: 2019-04-08

## 2019-01-08 RX ORDER — AZELASTINE HYDROCHLORIDE 0.5 MG/ML
1 SOLUTION/ DROPS OPHTHALMIC 2 TIMES DAILY
Qty: 3 BOTTLE | Refills: 3 | Status: SHIPPED | OUTPATIENT
Start: 2019-01-08 | End: 2020-01-23

## 2019-01-08 RX ORDER — METFORMIN HCL 500 MG
TABLET, EXTENDED RELEASE 24 HR ORAL
Qty: 81 TABLET | Refills: 3 | Status: SHIPPED | OUTPATIENT
Start: 2019-01-08 | End: 2019-04-22

## 2019-01-08 RX ORDER — LORATADINE 10 MG/1
10 TABLET ORAL DAILY PRN
Qty: 90 TABLET | Refills: 4 | Status: SHIPPED | OUTPATIENT
Start: 2019-01-08 | End: 2019-04-22

## 2019-01-08 NOTE — PROGRESS NOTES
Nursing Notes:   Zainab Sen CMA  1/8/2019  4:08 PM  Signed  Due to patient being non-English speaking/uses sign language, an  was used for this visit. Only for face-to-face interpretation by an external agency, date and length of interpretation can be found on the scanned worksheet.     name: May Philip  Agency: [employer]  Language: Jennifer   Telephone number: [phone]  Type of interpretation: Face-to-face, spoken        Beth De Leon, [facility]  1/9/2019 12:06 PM  Signed  Due to patient being non-English speaking/uses sign language, an  was used for this visit. Only for face-to-face interpretation by an external agency, date and length of interpretation can be found on the scanned worksheet.     name: May Philip  Agency: [employer]  Language: Jennifer   Telephone number: [phone]  Type of interpretation: Face-to-face, spoken      Chief Complaint   Patient presents with     Recheck Medication     Pt is here for a recheck on her medications.     Medication Reconciliation     Complete.      Blood pressure 108/67, pulse 71, temperature 98.8  F (37.1  C), temperature source Oral, resp. rate 16, weight 76.8 kg (169 lb 6.4 oz), SpO2 99 %.                 HPI     Jenise Paz is a 72 year old  female with a PMH significant for:     Patient Active Problem List   Diagnosis     Abnormal Pap smear of cervix     Cervical spondylosis     PTSD (post-traumatic stress disorder)     Hyperlipemia     Hypertension     Low back pain Chronic     DDD (degenerative disc disease), lumbar     Muscle Aches, Generalized (Myalgia)     Thyroid nodule     History of subtotal thyroidectomy     Vitamin D deficiency     Seasonal allergies     Noncompliance with medication regimen     Type 2 diabetes mellitus without complication, without long-term current use of insulin (H)     Coronary artery disease involving native coronary artery of native heart with unstable angina pectoris (H)      Anemia, iron deficiency     S/P CABG (coronary artery bypass graft)     She presents with follow-up of diabetes and medications.    Glipizide was increased at the last visit however she did not increase that.  She did start taking her medications more regularly.  See Pharm.D. note for more details about her medication compliance.  Denies chest pain, shortness of breath, weakness numbness tingling.  States that her blood sugars are usually in the 200s.  Does not have any low blood sugars.    She did mention that she has some increased back pain and is having a hard time getting up.  We did not have time to address this in detail today denies any bladder or bowel incontinence.  Feels like she is weak but may be due to pain.    PMH, Medications and Allergies were reviewed and updated as needed.     A [employer]  was used for this visit           Physical Exam:     Vitals:    01/08/19 1606   BP: 108/67   BP Location: Left arm   Patient Position: Sitting   Cuff Size: Adult Large   Pulse: 71   Resp: 16   Temp: 98.8  F (37.1  C)   TempSrc: Oral   SpO2: 99%   Weight: 76.8 kg (169 lb 6.4 oz)     Body mass index is 34.8 kg/m .    Exam:  Constitutional: healthy, alert and no distress  Cardiovascular:RRR. No murmurs, clicks gallops or rub  Respiratory:  normal respiratory rate and rhythm, lungs clear to auscultation. No wheezes or crackles.  Feet: Skin is intact normal sensation to light touch.  Psychiatric: mentation appears normal and affect normal/bright        Results for orders placed or performed in visit on 01/08/19   Basic Metabolic Panel ([place])   Result Value Ref Range    Urea Nitrogen 19.9 (H) 7.0 - 19.0 mg/dL    Calcium 9.6 8.5 - 10.1 mg/dL    Chloride 98.7 98.0 - 110.0 mmol/L    Carbon Dioxide 31.7 20.0 - 32.0 mmol/L    Creatinine 0.9 0.5 - 1.0 mg/dL    Glucose 309.7 (H) 70.0 - 99.0 mg'dL    Potassium 4.2 3.2 - 4.6 mmol/dL    Sodium 135.8 132.0 - 142.0 mmol/L    GFR Estimate 65.8 >60.0 mL/min/1.7 m2    GFR  Estimate If Black 79.6 >60.0 mL/min/1.7 m2   Hemoglobin A1c ([facility])   Result Value Ref Range    Hemoglobin A1C 14.5 (H) 4.1 - 5.7 %       Assessment and Plan     Jenise was seen today for recheck medication and medication reconciliation.    Diagnoses and all orders for this visit:    Type 2 diabetes mellitus without complication, without long-term current use of insulin (H): Very poorly controlled despite now being compliant with medications.  She is still declining to go on insulin despite that being really the only option that we will get her controlled.  We discussed diabetes physiology and why oral medications will not be enough for her even if she takes them regularly.  She states that she understands with the insulin pens are like his friends have used them and does not want to do that or be dependent on that at this time she will think about it for the next visit.  She is willing to increase her glipizide to 2 pills daily and taper up on her metformin again.  We had a very long discussion about insulin treatment, and she understands the risks and benefits in detail.  -     Basic Metabolic Panel ([place])  -     Hemoglobin A1c ([facility])  -     blood glucose (LAURA CONTOUR) test strip; Use to test blood sugars 1 times daily or as directed.  -     glipiZIDE (GLUCOTROL XL) 10 MG 24 hr tablet; Take 2 tablets (20 mg) by mouth daily  -     blood glucose monitoring (LAURA MICROLET) lancets; Test once daily  -     sitagliptin (JANUVIA) 100 MG tablet; Take 1 tablet (100 mg) by mouth daily  -     metFORMIN (GLUCOPHAGE-XR) 500 MG 24 hr tablet; Take 1 tablet (500 mg) by mouth daily with food for 7 days, THEN 2 tablets (1,000 mg) daily with food for 7 days, THEN 3 tablets (1,500 mg) daily with food for 7 days, THEN 4 tablets (2,000 mg) daily with food for 7 days.    Chronic left-sided low back pain with left-sided sciatica: Refilled her medications.  Sounds like this is getting worse but we did not have time to address it  due to long insulin discussion.  We will have her come back for a visit to discuss this in more detail.  -     acetaminophen (TYLENOL) 500 MG tablet; Take 2 tablets (1,000 mg) by mouth 3 times daily as needed  -     gabapentin (NEURONTIN) 300 MG capsule; Take 1 tablet (300 mg) every night for 3 days, then 1 tablet twice daily for 3 days, then 1 tablet three times daily    Coronary artery disease involving native coronary artery of native heart : Status post bypass surgery.  Refilled medications.  No current cardiac symptoms.  -     aspirin (ASA) 81 MG tablet; Take 1 tablet daily  -     lisinopril (PRINIVIL/ZESTRIL) 2.5 MG tablet; Take 1 tablet (2.5 mg) by mouth daily  -     nitroGLYcerin (NITROSTAT) 0.4 MG sublingual tablet; Place 1 tablet (0.4 mg) under the tongue every 5 minutes as needed for chest pain  -     atorvastatin (LIPITOR) 80 MG tablet; Take 1 tablet (80 mg) by mouth daily  -     isosorbide mononitrate (IMDUR) 30 MG 24 hr tablet; Take 0.5 tablets (15 mg) by mouth daily  Seasonal allergic rhinitis due to other allergic trigger: Refilled medication  -     azelastine (OPTIVAR) 0.05 % ophthalmic solution; Place 1 drop into both eyes 2 times daily  -     loratadine (CLARITIN) 10 MG tablet; Take 1 tablet (10 mg) by mouth daily as needed for allergies    Osteopenia, unspecified location: Agreed to DEXA scan.  Would start treatment if needed.  Treat based on results.  -     Dexa hip/pelvis/spine*; Future        Patient Instructions   Increase glipizide to 2 tablets once day.  Restart metformin 500mg daily for one week then 1000mg daily for one week and then 1500mg daily for one week then 2000mg daily.    I think that you need insulin for your diabetes.  Your body is starving without it.    I ordered a dexa scan.    DEXA SCAN  January 9, 2019 faxed order and demographics to [facility] Radiology Scheduling at [phone] who will contact patient to assist. Carmelina Perez CMA      Follow-up 2 weeks for diabetes  and back pain.     Options for treatment and/or follow-up care were reviewed with the patient. Jenise Paz was engaged and actively involved in the decision making process. She verbalized understanding of the options discussed and was satisfied with the final plan.    Afia Cortez MD

## 2019-01-08 NOTE — NURSING NOTE
Due to patient being non-English speaking/uses sign language, an  was used for this visit. Only for face-to-face interpretation by an external agency, date and length of interpretation can be found on the scanned worksheet.     name: May Philip  Agency: [employer]  Language: Jennifer   Telephone number: [phone]  Type of interpretation: Face-to-face, spoken

## 2019-01-08 NOTE — PATIENT INSTRUCTIONS
Increase glipizide to 2 tablets once day.  Restart metformin 500mg daily for one week then 1000mg daily for one week and then 1500mg daily for one week then 2000mg daily.    I think that you need insulin for your diabetes.  Your body is starving without it.    I ordered a dexa scan.    DEXA SCAN  January 9, 2019 faxed order and demographics to [facility] Radiology Scheduling at [phone] who will contact patient to assist. Carmelina Perez CMA

## 2019-01-09 ENCOUNTER — RECORDS - HEALTHEAST (OUTPATIENT)
Dept: ADMINISTRATIVE | Facility: OTHER | Age: 74
End: 2019-01-09

## 2019-01-09 NOTE — PROGRESS NOTES
Medication Management Note                                                       Jenise was referred by Dr. Cortez for pharmacy services for Med Management.    MEDICATION REVIEW:  Discussed all medication indications, dosage and effectiveness, adverse effects, and adherence with patient/caregiver.  Her daughter in law was with her; she helps her with her meds.  She answered several of the questions during our discussion.    Pt had meds with them: no  Pt had med list with them: yes  Pt was knowledgeable about meds: yes (daughter in law helped)  Medications set up by: son & daughter in law  Medications administered by someone else (e.g., LTCF): No, but so & daughter in law help  Pt uses a medication box or automated dispenser: yes  Called pharmacy to obtain or clarify med list:  no  Called HHN or LTC[facility] to obtain or clarify med list:  no    Medication Discrepancies  Medications on EMR med list that pt is NOT taking:  yes, senna  Medications pt IS taking that are NOT on EMR med list (e.g., from specialist, hospital): none  OTC meds/ dietary supplements pt taking on own that are NOT on EMR med list:  none  Dosage listed differently than how patient is taking: yes, she never increased glipizide to 20mg daily (2x10mg tabs) and has only ever taken one tab (10mg) daily  Frequency listed differently than how patient is taking: none  Duplicate medication on list (two occurrences of the same medication):  none  TOTAL NUMBER OF MEDICATION DISCREPANCIES:  1    Subjective                                                       Patient reports the following problems or concerns with their medications:  yes, has not refilled any meds since [facility] closed.  She wants all Rxs sent to [facility] on [address] in [place].  Patient reports the following adverse reactions to medications:  none  Pt reports missing doses:  1 time per week  Additional subjective information (e.g., reason for visit, frequency of PRNs, reasons meds were  D/C ed):    In the past, she purposely didn't take many/any of her meds and skipped doses, but since her last visit, she and her daughter in law state that she has made a decision that she was going to be much better at taking her medicines and has been taking them regularly, only missing one dose about 1-2 times per week.    She has taken metformin in the past, and stopped it on her own when she purposely didn't take some meds.  It was not stopped due to a side effect.  The reason it was not restarted is that she was also restarting several other important meds and there was a concern about giving her GI side effects when other meds were also being started, risking her stopping all her meds.    Objective                                                       Patient Active Problem List   Diagnosis     Abnormal Pap smear of cervix     Cervical spondylosis     PTSD (post-traumatic stress disorder)     Hyperlipemia     Hypertension     Low back pain Chronic     DDD (degenerative disc disease), lumbar     Muscle Aches, Generalized (Myalgia)     Thyroid nodule     History of subtotal thyroidectomy     Vitamin D deficiency     Seasonal allergies     Noncompliance with medication regimen     Type 2 diabetes mellitus without complication, without long-term current use of insulin (H)     Coronary artery disease involving native coronary artery of native heart with unstable angina pectoris (H)     Anemia, iron deficiency     S/P CABG (coronary artery bypass graft)       Current Outpatient Medications   Medication Sig Dispense Refill     acetaminophen (TYLENOL) 500 MG tablet Take 2 tablets (1,000 mg) by mouth 3 times daily as needed 200 tablet 12     aspirin (ASA) 81 MG tablet Take 1 tablet daily 90 tablet 4     atorvastatin (LIPITOR) 80 MG tablet Take 1 tablet (80 mg) by mouth daily 90 tablet 3     azelastine (OPTIVAR) 0.05 % ophthalmic solution Place 1 drop into both eyes 2 times daily 3 Bottle 3     blood glucose (LAURA  CONTOUR) test strip Use to test blood sugars 1 times daily or as directed. 1 Box 11     blood glucose monitoring (LAURA MICROLET) lancets Test once daily 100 each 3     gabapentin (NEURONTIN) 300 MG capsule Take 1 tablet (300 mg) every night for 3 days, then 1 tablet twice daily for 3 days, then 1 tablet three times daily 270 capsule 3     glipiZIDE (GLUCOTROL XL) 10 MG 24 hr tablet Take 2 tablets (20 mg) by mouth daily 180 tablet 4     isosorbide mononitrate (IMDUR) 30 MG 24 hr tablet Take 0.5 tablets (15 mg) by mouth daily 90 tablet 4     lisinopril (PRINIVIL/ZESTRIL) 2.5 MG tablet Take 1 tablet (2.5 mg) by mouth daily 90 tablet 3     loratadine (CLARITIN) 10 MG tablet Take 1 tablet (10 mg) by mouth daily as needed for allergies 90 tablet 4     metFORMIN (GLUCOPHAGE-XR) 500 MG 24 hr tablet Take 1 tablet (500 mg) by mouth daily with food for 7 days, THEN 2 tablets (1,000 mg) daily with food for 7 days, THEN 3 tablets (1,500 mg) daily with food for 7 days, THEN 4 tablets (2,000 mg) daily with food for 7 days. 81 tablet 3     metoprolol succinate ER (TOPROL-XL) 25 MG 24 hr tablet Take 1 tablet (25 mg) by mouth daily 90 tablet 4     nitroGLYcerin (NITROSTAT) 0.4 MG sublingual tablet Place 1 tablet (0.4 mg) under the tongue every 5 minutes as needed for chest pain 25 tablet 12     sitagliptin (JANUVIA) 100 MG tablet Take 1 tablet (100 mg) by mouth daily 90 tablet 3       Social History     Tobacco Use     Smoking status: Never Smoker     Smokeless tobacco: Never Used   Substance Use Topics     Alcohol use: No     Drug use: No       Estimated Creatinine Clearance: 70.5 mL/min (based on SCr of 0.9 mg/dL).    Lab Results   Component Value Date    A1C 14.5 01/08/2019    A1C 12.2 10/23/2018    A1C 10.8 03/12/2018    A1C 7.2 11/07/2016    A1C 6.9 04/27/2016     Last Comprehensive Metabolic Panel:  Sodium   Date Value Ref Range Status   01/08/2019 135.8 132.0 - 142.0 mmol/L Final     Potassium   Date Value Ref Range Status    01/08/2019 4.2 3.2 - 4.6 mmol/dL Final     Chloride   Date Value Ref Range Status   01/08/2019 98.7 98.0 - 110.0 mmol/L Final     Carbon Dioxide   Date Value Ref Range Status   01/08/2019 31.7 20.0 - 32.0 mmol/L Final     Glucose   Date Value Ref Range Status   01/08/2019 309.7 (H) 70.0 - 99.0 mg'dL Final     Urea Nitrogen   Date Value Ref Range Status   01/08/2019 19.9 (H) 7.0 - 19.0 mg/dL Final     Creatinine   Date Value Ref Range Status   01/08/2019 0.9 0.5 - 1.0 mg/dL Final     GFR Estimate   Date Value Ref Range Status   01/08/2019 65.8 >60.0 mL/min/1.7 m2 Final     Calcium   Date Value Ref Range Status   01/08/2019 9.6 8.5 - 10.1 mg/dL Final       BP Readings from Last 3 Encounters:   01/08/19 108/67   10/23/18 106/67   03/20/18 135/79       The 10-year ASCVD risk score (Cori SHELDON Jr., et al., 2013) is: 17.3%    Values used to calculate the score:      Age: 72 years      Sex: Female      Is Non- : No      Diabetic: Yes      Tobacco smoker: No      Systolic Blood Pressure: 108 mmHg      Is BP treated: Yes      HDL Cholesterol: 43 mg/dL      Total Cholesterol: 185 mg/dL    PHQ-9 score:    PHQ-9 SCORE 5/15/2014   PHQ-9 Total Score 7     In 2014, she had a DEXA showing osteoporosis in the lumbar spine (T -2.6), and osteopenia in all other sites    Assessment                                                       Type 2 DM -  uncontrolled     A1c today 14.5%    HTN    Controlled on lisinopril and metoprolol    Osteoporosis -  uncontrolled     Not on treatment      Plan/Recommendations                                                       Updated medication list in the EMR; deleted meds patient no longer taking and added meds patient is now taking, and changed doses where there was a dose discrepancy.    All medications were reviewed and found to be indicated, effective, safe and convenient/ affordable unless drug therapy problem(s) was/were identified, as are described below.      Completed  at this visit     Type 2 DM    Increase glipizide to 20mg/d    Start metformin    Had a long discussion about the need for insulin, and that oral meds would not get her to her goal for diabetes.  She said she absolutely did not want any shots.  I then discussed other injectable meds that could be taken only once weekly, where you never see the needle (e.g., Trulicity; GLP-1), and she was still not interested.    Dr. Cortez also discussed need for insulin, and she said she would think about it    Osteoporosis    Discussed her history of osteoporosis and need for another DEXA and potential medication    She agreed to get another DEXA and said she would be willing to take meds for this if needed.    Dr. Cortez referred her for DEXA    To be completed at a future visit  Type 2 DM    Reassess willingness to take insulin again and discuss the importance/necessity of insulin with her poor DM control    Show her actual insulin pen and needle and how to use to she sees how tiny the needles are.    Osteoporosis    Review results of new DEXA scan and start osteoporosis therapy if needed.    Options for treatment and/or follow-up care were reviewed with the patient.  Jenise was engaged and actively involved in the decision making process, verbalized understanding of the options discussed, and was satisfied with the final plan.    Follow-up                                                        Patient was provided with written instructions/medication list via AVS.     Dr. Cortez was provided the recommendations above  in clinic today and Dr. Cortez was available for supervision during this visit and is the authorizing prescriber for this visit through the pharmacist collaborative practice agreement.    Beth De Leon, PharmD      Drug therapy problems identified  1. Med: glipizide - Efficacy  - dose too low - Resolution: Change dose; resolved  2. Med: metformin - Indication - needs additional therapy - Resolution: Intiate Drug;  resolved   3. Med: alendronate - Indication - lab/test needed to determine treatment - Resolution: Order lab or test; resolved     # of medical conditions addressed: 3  # of medications addressed: 12  # of medication discrepancies identified: 1  # of DTP identified: 3  Time spent: 30 minutes  Level of service: 4    Due to patient being non-English speaking/uses sign language, an  was used for this visit. Only for face-to-face interpretation by an external agency, date and length of interpretation can be found on the scanned worksheet.      name: May Philip  Agency: [employer]  Language: Jennifer   Telephone number: [phone]  Type of interpretation: Face-to-face, spoken

## 2019-01-09 NOTE — NURSING NOTE
Due to patient being non-English speaking/uses sign language, an  was used for this visit. Only for face-to-face interpretation by an external agency, date and length of interpretation can be found on the scanned worksheet.     name: May Philip  Agency: [employer]  Language: Jennifer   Telephone number: [phone]  Type of interpretation: Face-to-face, spoken

## 2019-01-16 DIAGNOSIS — E11.9 TYPE 2 DIABETES MELLITUS WITHOUT COMPLICATION, WITHOUT LONG-TERM CURRENT USE OF INSULIN (H): ICD-10-CM

## 2019-01-17 ENCOUNTER — RECORDS - HEALTHEAST (OUTPATIENT)
Dept: BONE DENSITY | Facility: CLINIC | Age: 74
End: 2019-01-17

## 2019-01-17 ENCOUNTER — MEDICAL CORRESPONDENCE (OUTPATIENT)
Dept: HEALTH INFORMATION MANAGEMENT | Facility: CLINIC | Age: 74
End: 2019-01-17

## 2019-01-17 ENCOUNTER — RECORDS - HEALTHEAST (OUTPATIENT)
Dept: ADMINISTRATIVE | Facility: OTHER | Age: 74
End: 2019-01-17

## 2019-01-17 DIAGNOSIS — M85.80 OTHER SPECIFIED DISORDERS OF BONE DENSITY AND STRUCTURE, UNSPECIFIED SITE: ICD-10-CM

## 2019-01-17 DIAGNOSIS — Z78.0 ASYMPTOMATIC MENOPAUSAL STATE: ICD-10-CM

## 2019-01-22 ENCOUNTER — OFFICE VISIT (OUTPATIENT)
Dept: FAMILY MEDICINE | Facility: CLINIC | Age: 74
End: 2019-01-22
Payer: MEDICARE

## 2019-01-22 VITALS
TEMPERATURE: 98.4 F | RESPIRATION RATE: 20 BRPM | HEART RATE: 61 BPM | SYSTOLIC BLOOD PRESSURE: 127 MMHG | OXYGEN SATURATION: 96 % | WEIGHT: 178 LBS | DIASTOLIC BLOOD PRESSURE: 77 MMHG | BODY MASS INDEX: 36.57 KG/M2

## 2019-01-22 DIAGNOSIS — M85.80 OSTEOPENIA, UNSPECIFIED LOCATION: ICD-10-CM

## 2019-01-22 DIAGNOSIS — E89.0 HISTORY OF SUBTOTAL THYROIDECTOMY: ICD-10-CM

## 2019-01-22 DIAGNOSIS — M47.812 SPONDYLOSIS OF CERVICAL REGION WITHOUT MYELOPATHY OR RADICULOPATHY: ICD-10-CM

## 2019-01-22 DIAGNOSIS — M54.16 LUMBAR BACK PAIN WITH RADICULOPATHY AFFECTING LEFT LOWER EXTREMITY: Primary | ICD-10-CM

## 2019-01-22 DIAGNOSIS — Z12.11 ENCOUNTER FOR SCREENING COLONOSCOPY: ICD-10-CM

## 2019-01-22 NOTE — PATIENT INSTRUCTIONS
Get MRI and thyroid ultrasound.    Start calcium vit D pill twice a day for strong bones.    US Head Neck Soft Tissue  MRI LUMBAR SPINE W/O CONTRAST  2019 at 10:34 am Called [employer] for a [employer]  [employer] ID #[id number] - left a message for Jenise to call back - are there days/time that work better for patient to go? Preferred location? [facility]  At 10:42 am received voicemail from daughter Saida - call back numbers [phone] and work at [phone].  At 11:17 am called and left a message for daughter Saida - are there days/time that work better for patient to go? Preferred location? [facility]  At 11:25 am received call back from Saida [facility] on  or  after 10:00 am, Or Wednesday, Thursday or Friday after 4 pm. Advised will call back with appointment details.     [facility] Radiology  [phone]  Fax Orders to [phone]    [address] [address]    Appointment:  2019  Arrival Time:  11:45 am     [facility] has requested a [employer]  for your appointment    Please bring a copy of your insurance card and photo ID    If you cannot make this appointment please call [phone] to reschedule    2019 at 11:34 am called and relayed appointment details to az Desir - no additional questions or concerns at this time. Orders faxed to [facility] Scheduling at [phone]. [facility]

## 2019-01-22 NOTE — NURSING NOTE
Due to patient being non-English speaking/uses sign language, an  was used for this visit. Only for face-to-face interpretation by an external agency, date and length of interpretation can be found on the scanned worksheet.     name: Lida Silver  Agency: [employer]  Language: Jennifer   Telephone number: [phone]  Type of interpretation: Face-to-face, spoken

## 2019-01-22 NOTE — PROGRESS NOTES
Nursing Notes:   Darci Zepeda, [facility]  1/22/2019  4:24 PM  Signed    Due to patient being non-English speaking/uses sign language, an  was used for this visit. Only for face-to-face interpretation by an external agency, date and length of interpretation can be found on the scanned worksheet.     name: Lida Silver  Agency: [employer]eri [employer]  Language: [facility]   Telephone number: [phone]  Type of interpretation: Face-to-face, spoken      Chief Complaint   Patient presents with     Back Pain     f/u back pain and xray results     Radiology Visit     mammogram was declined     Colonoscopy     FIT Testing ordered     Blood pressure 127/77, pulse 61, temperature 98.4  F (36.9  C), temperature source Oral, resp. rate 20, weight 80.7 kg (178 lb), SpO2 96 %.                 HPI     Jenise Paz is a 72 year old  female with a PMH significant for:     Patient Active Problem List   Diagnosis     Abnormal Pap smear of cervix     Cervical spondylosis     PTSD (post-traumatic stress disorder)     Hyperlipemia     Hypertension     Low back pain Chronic     DDD (degenerative disc disease), lumbar     Muscle Aches, Generalized (Myalgia)     Thyroid nodule     History of subtotal thyroidectomy     Vitamin D deficiency     Seasonal allergies     Noncompliance with medication regimen     Type 2 diabetes mellitus without complication, without long-term current use of insulin (H)     Coronary artery disease involving native coronary artery of native heart with unstable angina pectoris (H)     Anemia, iron deficiency     S/P CABG (coronary artery bypass graft)     She presents with follow up chronic back pain and difficulty with standing.    #Saw for lumbar pain last year 3/2018 and sent to PT for wheel eval and help with the pain. Saw them one time and did not want to go back. Has not had MRI of Lumbar spine that I can see.  Did have a wound from the war in her thigh and this is where she feels that pain is from.  Does not  "know what triggers the pain flares.  Very intense when it comes radiates down her thigh and calf on the left leg only.  This is getting worse as she ages and making walking difficult and rising from sitting is difficult.  She has weakness and stairs are difficult and left leg cannot hold her weight.  Sleeping is okay if she lays on the right side. Left leg is also numb and tingling off and on, depends on how she is sitting.  No trouble with urination or stools.  Pain is helped some by massage and tylenol.  Does not remember if PT was helpful.    #Needs thyroid imaging due to past growing residual thyroid tissue. Never got thyroid US as intended in 2014.  Normal thyroid function 10/2018. She is not sure why she needed thyroid removed as it was over 20 years ago.  No h/o cancer that she knows of.  No current sxs from thyroid.    #H/o neck pain and arthritis. Referral to [facility] in 2013: \"Abnormal MRI of cervical spine in the past 9/2009 at [facility] showing C5-C6 DJD with moderate to severe bilateral foraminal narrowing and mild to moderate central canal narrowing, but did not want interventions or referral at that time.  Pain in neck and down arms is getting worse.  Would like further evaluation and possible injections if indicated.\"  Saw [facility] with repeat MRI, carpal tunnel on EMG and no radiculopathy on EMG.  Acupuncture helped with her neck pain. Currently only has neck pain when the back pain is at its worst.    Also has uncontrolled diabetes and trying get her to start insulin but she is declining today.    PMH, Medications and Allergies were reviewed and updated as needed.     A [employer]  was used for this visit           Physical Exam:     Vitals:    01/22/19 1623   BP: 127/77   Pulse: 61   Resp: 20   Temp: 98.4  F (36.9  C)   TempSrc: Oral   SpO2: 96%   Weight: 80.7 kg (178 lb)     Body mass index is 36.57 kg/m .    Exam:  Constitutional: healthy, alert and no distress  Back: No " tenderness to palpation of spine, paraspinal muscles.  Minimal tenderness in left sciatic area. Negative straight leg raise today.  Left hip flexor and foot dorsiflexion 4/5 strength, otherwise 5/5.  Rises from chair slowly.  Antalgic gait with cane.  Extremities: No C/C/E in BLE.  Psychiatric: mentation appears normal and affect normal/bright    Results for orders placed or performed in visit on 01/08/19   Basic Metabolic Panel ([facility])   Result Value Ref Range    Urea Nitrogen 19.9 (H) 7.0 - 19.0 mg/dL    Calcium 9.6 8.5 - 10.1 mg/dL    Chloride 98.7 98.0 - 110.0 mmol/L    Carbon Dioxide 31.7 20.0 - 32.0 mmol/L    Creatinine 0.9 0.5 - 1.0 mg/dL    Glucose 309.7 (H) 70.0 - 99.0 mg'dL    Potassium 4.2 3.2 - 4.6 mmol/dL    Sodium 135.8 132.0 - 142.0 mmol/L    GFR Estimate 65.8 >60.0 mL/min/1.7 m2    GFR Estimate If Black 79.6 >60.0 mL/min/1.7 m2   Hemoglobin A1c ([facility])   Result Value Ref Range    Hemoglobin A1C 14.5 (H) 4.1 - 5.7 %       Assessment and Plan     Jenise was seen today for back pain.    Diagnoses and all orders for this visit:    Lumbar back pain with radiculopathy affecting left lower extremity: significant weakness and increased pain in the left leg.  Has past injury to left thigh many years ago but radicular sxs are new.  Needs MRI to evaluate for nerve impingement since she is so weak.  Continue tylenol and massage for now further treatment based on imaging results.  -     MRI LUMBAR SPINE W/O CONTRAST; Future    Spondylosis of cervical region without myelopathy or radiculopathy: no current neck pain or arm radicular symptoms.     Osteopenia, unspecified location: discussed Dexa results of osteopenia and need for calcium and vitamin D supplements to strengthen bones.  Will discuss weight bearing exercise in the future after work up of back pain is complete.  Repeat Dexa in two years.  -     calcium carbonate-vitamin D (OS-SAHIL) 500-400 MG-UNIT tablet; Take 1 tablet by mouth 2 times daily    History  of subtotal thyroidectomy: 20 years ago.  Unclear indication but no malignancy history. 2014 MRI of neck showed thyroid tissue that had regrown and recommend US.  This was never done at that time.  She agrees to this today.  Is having no sxs from the thyroid area.  -     US Head Neck Soft Tissue; Future        Patient Instructions   Get MRI and thyroid ultrasound.    Start calcium vit D pill twice a day for strong bones.    Return in about 2 weeks (around 2/5/2019) for Follow up imaging and diabetes..     Options for treatment and/or follow-up care were reviewed with the patient. Jenise Paz was engaged and actively involved in the decision making process. She verbalized understanding of the options discussed and was satisfied with the final plan.    Afia Cortez MD

## 2019-01-23 ENCOUNTER — RECORDS - HEALTHEAST (OUTPATIENT)
Dept: ADMINISTRATIVE | Facility: OTHER | Age: 74
End: 2019-01-23

## 2019-01-23 DIAGNOSIS — M85.80 OSTEOPENIA, UNSPECIFIED LOCATION: ICD-10-CM

## 2019-01-28 ENCOUNTER — HOSPITAL ENCOUNTER (OUTPATIENT)
Dept: MRI IMAGING | Facility: CLINIC | Age: 74
Discharge: HOME | End: 2019-01-28
Attending: FAMILY MEDICINE

## 2019-01-28 ENCOUNTER — HOSPITAL ENCOUNTER (OUTPATIENT)
Dept: ULTRASOUND IMAGING | Facility: CLINIC | Age: 74
Discharge: HOME | End: 2019-01-28
Attending: FAMILY MEDICINE

## 2019-01-28 DIAGNOSIS — E89.0 HISTORY OF SUBTOTAL THYROIDECTOMY: ICD-10-CM

## 2019-01-28 DIAGNOSIS — M54.16 LUMBAR RADICULOPATHY: ICD-10-CM

## 2019-01-28 DIAGNOSIS — Z98.890 OTHER SPECIFIED POSTPROCEDURAL STATES: ICD-10-CM

## 2019-01-31 DIAGNOSIS — E89.0 HISTORY OF SUBTOTAL THYROIDECTOMY: ICD-10-CM

## 2019-02-05 DIAGNOSIS — M54.16 LUMBAR BACK PAIN WITH RADICULOPATHY AFFECTING LEFT LOWER EXTREMITY: ICD-10-CM

## 2019-02-06 ENCOUNTER — OFFICE VISIT (OUTPATIENT)
Dept: FAMILY MEDICINE | Facility: CLINIC | Age: 74
End: 2019-02-06
Payer: MEDICARE

## 2019-02-06 DIAGNOSIS — M54.50 CHRONIC BILATERAL LOW BACK PAIN WITHOUT SCIATICA: ICD-10-CM

## 2019-02-06 DIAGNOSIS — E11.59 TYPE 2 DIABETES MELLITUS WITH OTHER CIRCULATORY COMPLICATION, WITHOUT LONG-TERM CURRENT USE OF INSULIN (H): Primary | ICD-10-CM

## 2019-02-06 DIAGNOSIS — E04.1 THYROID NODULE: ICD-10-CM

## 2019-02-06 DIAGNOSIS — G89.29 CHRONIC BILATERAL LOW BACK PAIN WITHOUT SCIATICA: ICD-10-CM

## 2019-02-06 NOTE — PROGRESS NOTES
S: Jenise Paz is a 72 year old female with a PMH of coronary artery disease, type 2 diabetes mellitus, hypertension, history of thyroidectomy with current thyroid nodule, and persistent lumbar back pain presenting to clinic today for follow-up on her results of recent spine MRI.    -I did see this patient of Dr. Cortez at the end of the day and was requested to discuss the possibility of a referral to Ortho for back pain.  Has had back pain for quite some time.  Doing massage for now to help with the pain.  No bowel or bladder incontinence.  No saddle anesthesia symptoms.  Able to ambulate.  Not taking any medications for the pain.  She did recently have an MRI of her spine and these results were reviewed in detail with the patient.  She has bulging of her disc in the lumbar/sacral region, as well as multilevel degenerative facet joint changes and spinal stenosis.  I discussed these results extensively with the patient and her daughter-in-law and showed them pictures to explain the issues.  I did discuss referral to orthopedics for possible discussion of surgery or joint injections, and she was adamantly opposed to this.  She does not want to go to orthopedics and does not wish to pursue any procedures at this time.  She is open to doing some stretches/exercises at home, but does not want to do physical therapy either at this time.    -Thyroid--patient had a thyroidectomy, partial, several years ago, as noted by the daughter-in-law.  She recently had a thyroid ultrasound, which revealed a thyroid nodule in the remaining portion of the thyroid.  It was recommended that this nodule be biopsied, FNA, based on its appearance, but the patient again adamantly refuses and does not wish to proceed with any sort of procedure.  I did discuss with her that this could be cancerous and she understands that it could be cancer and that she could die from this, but does not want to pursue any diagnostic or therapeutic procedure for  it.    -Diabetes mellitus type 2--patient had a recent A1c of 14.2.  She has refused insulin in the past and discussions with Dr. Cortez.  She continues on Januvia 100 mg daily, metformin 2000 mg daily, and glipizide 20 mg daily.  She did have microalbuminuria on her latest screening test.  It does sound like she is missing her morning medications at least twice a week.  Her daughter-in-law and son set up her medications and her med box, but they both work during the day, and the patient sleeps and often so gets up late and does not take her medications.  This is happening at least twice a week.  She denies increased thirst or increased urination at this time, but does state that today she is having some blurry vision and feels like her blood sugar is high.  Again we discussed extensively the likely need for insulin therapy and that this could lead to complications moving forward if she does not control her blood sugars, but she is adamantly opposed to using insulin at this time.  Her daughter-in-law confirms that she is quite stubborn and has refused insulin in the past.    ROS:  Constitutional: No fevers or chills.  Eyes: + blurry vision.  No double vision.   Card/Vasc: No chest pain or palpitations.  Respiratory: No shortness of breath.  No cough.   GI: No nausea, vomiting, or abdominal pain.  Genitourinary: No urinary frequency or dysuria.  Musculoskeletal: + Low back pain.  Integument: No skin changes.  Psych: Euthymic.  Heme/Lymph: No lower extremity edema.  Allergy/Immunology: No known allergies.    Patient Active Problem List   Diagnosis     Abnormal Pap smear of cervix     Cervical spondylosis     PTSD (post-traumatic stress disorder)     Hyperlipemia     Hypertension     Low back pain Chronic     DDD (degenerative disc disease), lumbar     Muscle Aches, Generalized (Myalgia)     Thyroid nodule     History of subtotal thyroidectomy     Vitamin D deficiency     Seasonal allergies     Noncompliance with  medication regimen     Type 2 diabetes mellitus without complication, without long-term current use of insulin (H)     Coronary artery disease involving native coronary artery of native heart with unstable angina pectoris (H)     Anemia, iron deficiency     S/P CABG (coronary artery bypass graft)     Current Outpatient Medications   Medication Sig Dispense Refill     acetaminophen (TYLENOL) 500 MG tablet Take 2 tablets (1,000 mg) by mouth 3 times daily as needed 200 tablet 12     aspirin (ASA) 81 MG tablet Take 1 tablet daily 90 tablet 4     atorvastatin (LIPITOR) 80 MG tablet Take 1 tablet (80 mg) by mouth daily 90 tablet 3     azelastine (OPTIVAR) 0.05 % ophthalmic solution Place 1 drop into both eyes 2 times daily 3 Bottle 3     blood glucose (LAURA CONTOUR) test strip Use to test blood sugars 1 times daily or as directed. 1 Box 11     blood glucose monitoring (LAURA MICROLET) lancets Test once daily 100 each 3     calcium carbonate-vitamin D (OS-SAHIL) 500-400 MG-UNIT tablet Take 1 tablet by mouth 2 times daily 180 tablet 3     gabapentin (NEURONTIN) 300 MG capsule Take 1 tablet (300 mg) every night for 3 days, then 1 tablet twice daily for 3 days, then 1 tablet three times daily 270 capsule 3     glipiZIDE (GLUCOTROL XL) 10 MG 24 hr tablet Take 2 tablets (20 mg) by mouth daily 180 tablet 4     isosorbide mononitrate (IMDUR) 30 MG 24 hr tablet Take 0.5 tablets (15 mg) by mouth daily 90 tablet 4     lisinopril (PRINIVIL/ZESTRIL) 2.5 MG tablet Take 1 tablet (2.5 mg) by mouth daily 90 tablet 3     loratadine (CLARITIN) 10 MG tablet Take 1 tablet (10 mg) by mouth daily as needed for allergies 90 tablet 4     metFORMIN (GLUCOPHAGE-XR) 500 MG 24 hr tablet Take 1 tablet (500 mg) by mouth daily with food for 7 days, THEN 2 tablets (1,000 mg) daily with food for 7 days, THEN 3 tablets (1,500 mg) daily with food for 7 days, THEN 4 tablets (2,000 mg) daily with food for 7 days. 81 tablet 3     metoprolol succinate ER  (TOPROL-XL) 25 MG 24 hr tablet Take 1 tablet (25 mg) by mouth daily 90 tablet 4     nitroGLYcerin (NITROSTAT) 0.4 MG sublingual tablet Place 1 tablet (0.4 mg) under the tongue every 5 minutes as needed for chest pain 25 tablet 12     sitagliptin (JANUVIA) 100 MG tablet Take 1 tablet (100 mg) by mouth daily 90 tablet 3     O: /78, HR 78, RR 16, SpO2 97%, Temp 98.4 F.  Constitutional:  Well nourished and in no acute distress.  Smiling and interactive.  Eyes: EOMI.  No scleral icterus noted.   Head: Atraumatic, normocephalic.  CV:  RRR  - no murmurs noted.   Respiratory:  CTAB, no wheezes or crackles noted, good air entry in the posterior thorax.  Musc/Skeletal: No pain to palpation in the lumbar spine or paralumbar spinal musculature.  Patient able to flex and extend at the back/torso without difficulty.  Extrem: No lower extremity edema.  The calves are nontender bilaterally.  Neuro: Dorsalis pedis and posterior tibial pulses are 1+ and symmetric bilaterally.  No ulcerations appreciated over the plantar or dorsal surfaces of the feet bilaterally.  Sensation to light touch is grossly intact in both feet.  Psych: Euthymic.      Assessment and Plan:  Diagnoses and all orders for this visit:    Type 2 diabetes mellitus with other circulatory complication, without long-term current use of insulin (H)  -Hemoglobin A1c in January 2019 was 14.2.  Patient on Januvia, metformin, and glipizide, although I do suspect that she is not taking her medications at least twice a week.  Daughter-in-law to work on setting alarm for the patient to remember to take her medicines each day.  Patient still adamantly refuses insulin.  Wants to check her blood sugar here today, she feels like she has blurry vision and feels like her sugar is high.  Unfortunately, the lab is closed now at 530.  Her glucose meter was lost at home, so I did prescribe another one, and I recommended checking her blood sugar at home this evening.  If it is  greater than 500, she should call the clinic and the on-call physician for further guidance and would likely need to go to the emergency room at that time.  -     Glucose By Meter ([place]); Future    Chronic bilateral low back pain without sciatica   -chronic, multilevel degenerative changes in the back and disc bulging as well.  Went over these results and images with the patient in detail.  She does not wish to go to orthopedics.  She does not wish to pursue any sort of medication control at this time.  She would be open to doing some stretches on her own at home, so I did provide some information on these.    Thyroid nodule  -Patient has had a thyroidectomy several years ago, partial, with the right thyroid lobe remaining.  There is a nodule in the right thyroid lobe that was recommended to be FNA biopsied, as it could be cancerous.  The patient was made aware of this and adamantly refuses biopsy and would not like to proceed with any sort of diagnostic or therapeutic intervention at this time.  She does understand the risks that this could be cancer and could lead to death, but does not wish to pursue any sort of intervention.    RTC in 1-2 weeks for further discussion with Dr. Cortez about diabetes control.    The patient was discussed and evaluated with Dr. Marcin MD.    Sudeep Roberts, PGY-2  [facility]  Pager:  [phone]

## 2019-02-06 NOTE — PATIENT INSTRUCTIONS
Jenise Paz,    Thank you very much for coming in today!  Here is some information on your MRI results shown below.  If you have further symptoms of blurry vision, dizziness, increased thirst, or increased urination, return to clinic tomorrow morning or go to the Emergency Room.  Please call to schedule a follow-up visit with Dr. Cortez next week to talk more about the Diabetes and back pain.  Thank you again for allowing me to be a part of your care!    Sincerely,    Dr. Roberts    Herniated Intervertebral Disk  A herniated disk happens when a spinal disk tears. Spinal disks are gel-filled cushions that sit between the bones of the spine (vertebrae). If a disk tears, the center may bulge out. The disk may then press on nearby nerves. This can cause severe pain. Numbness or tingling in an arm or leg may also occur.  You may need X-rays of the spine. If you've had the pain for a few weeks and it is not getting better after treatment, you may need an MRI scan. Treatment for a herniated disk usually involves pain medicines and home care. Surgery is not usually done to treat a herniated disk unless the nerve problem is causing weakness or numbness.    Home care  Medicines may be prescribed to help relieve pain, spasm, and swelling. You may be given a prescription. Or you may be told to use an over-the-counter pain reliever such as ibuprofen or naproxen. Take all medicines as directed.  For neck pain:    Use a pillow that supports your head and keeps your spine in a neutral position. Don't tilt your head forward or backward.  For back pain:    Try not to sit for long periods, especially if your low back is affected. Sitting puts more stress on the low back than standing or walking.    Sleep on a firm mattress with a pillow under your knees.    Have a regular exercise program to help strengthen the muscles that support the spine.    Avoid complete bed rest. It usually isn t helpful. It may even make your condition  worse.  General care    During the first 2 days after a pain flare-up, put an ice pack or bag of frozen peas on the painful area for 20 minutes. You can make your own ice pack by putting ice cubes in a plastic bag. Wrap the bag in a thin towel. Use the ice pack every 2 to 4 hours. This helps reduce swelling and pain.     Physical therapy or osteopathic manipulative therapy may help if the pain doesn t go away. Talk with your healthcare provider about these therapies.  Follow-up care  Follow up with your healthcare provider, or as advised. This is very important. If you ve been referred to a specialist, make an appointment right away.  When to seek medical advice  Call your healthcare provider right away if any of these occur:    Back pain gets worse    New weakness, numbness, or pain in one or both arms or legs    Numbness or tingling in your buttock or groin area    Your foot drags as you walk or you have new weakness in a foot    You can t control your bowel or bladder    You aren t able to have a bowel movement or pass urine    Fever of 100.4 F (38 C) or higher, or as advised    New symptoms that were not present during today s visit  Date Last Reviewed: 8/1/2016 2000-2018 [employer]. [address] [place] [address]. All rights reserved. This information is not intended as a substitute for professional medical care. Always follow your healthcare professional's instructions.    Patient Education   Patient Education     Lumbar Flexion (Flexibility)    1. Lie on your back on the floor, with your knees bent and your feet flat on the floor.  2. Gently pull your knees up toward your chest. Put your hands under your thighs to help pull your knees up.  3. Press your lower back down to the floor. Hold for 20 seconds.  4. Lower your legs back down to the floor and relax.  5. Repeat 2 times, or as instructed.  Date Last Reviewed: 3/10/2016    1647-1553 [employer]. [address] [place] [address]  Road, [place] [address]. All rights reserved. This information is not intended as a substitute for professional medical care. Always follow your healthcare professional's instructions.           Lumbar Extension (Flexibility)    1. Lie face down on your stomach, forehead on the floor. You can lie on a mat or towel.  2. Bend your arms next to your body and lift your upper body up on your forearms. Your palms and forearms should be flat on the floor. Keep your stomach and hips on the floor.  3. Hold your upper body up with your forearms for 20 seconds. Slowly lower back down to the floor.  4. Repeat 2 times, or as instructed.  Date Last Reviewed: 3/10/2016    7228-1248 [employer]. [address] [place] [address]. All rights reserved. This information is not intended as a substitute for professional medical care. Always follow your healthcare professional's instructions.

## 2019-02-06 NOTE — PROGRESS NOTES
Preceptor Attestation:   Patient seen, evaluated and discussed with the resident. I have verified the content of the note, which accurately reflects my assessment of the patient and the plan of care.   Supervising Physician:  Wali Burch MD

## 2019-02-14 ENCOUNTER — MEDICAL CORRESPONDENCE (OUTPATIENT)
Dept: HEALTH INFORMATION MANAGEMENT | Facility: CLINIC | Age: 74
End: 2019-02-14

## 2019-02-20 ENCOUNTER — MEDICAL CORRESPONDENCE (OUTPATIENT)
Dept: HEALTH INFORMATION MANAGEMENT | Facility: CLINIC | Age: 74
End: 2019-02-20

## 2019-03-09 ENCOUNTER — HEALTH MAINTENANCE LETTER (OUTPATIENT)
Age: 74
End: 2019-03-09

## 2019-03-18 ENCOUNTER — OFFICE VISIT (OUTPATIENT)
Dept: FAMILY MEDICINE | Facility: CLINIC | Age: 74
End: 2019-03-18
Payer: MEDICARE

## 2019-03-18 VITALS
RESPIRATION RATE: 16 BRPM | HEART RATE: 81 BPM | SYSTOLIC BLOOD PRESSURE: 118 MMHG | DIASTOLIC BLOOD PRESSURE: 77 MMHG | TEMPERATURE: 98.6 F | OXYGEN SATURATION: 94 %

## 2019-03-18 DIAGNOSIS — B02.9 HERPES ZOSTER WITHOUT COMPLICATION: Primary | ICD-10-CM

## 2019-03-18 DIAGNOSIS — M54.42 CHRONIC LEFT-SIDED LOW BACK PAIN WITH LEFT-SIDED SCIATICA: ICD-10-CM

## 2019-03-18 DIAGNOSIS — G89.29 CHRONIC LEFT-SIDED LOW BACK PAIN WITH LEFT-SIDED SCIATICA: ICD-10-CM

## 2019-03-18 RX ORDER — GABAPENTIN 300 MG/1
CAPSULE ORAL
Qty: 270 CAPSULE | Refills: 3 | Status: SHIPPED | OUTPATIENT
Start: 2019-03-18 | End: 2020-04-10

## 2019-03-18 RX ORDER — OXYCODONE AND ACETAMINOPHEN 5; 325 MG/1; MG/1
1 TABLET ORAL EVERY 6 HOURS PRN
Qty: 15 TABLET | Refills: 0 | Status: SHIPPED | OUTPATIENT
Start: 2019-03-18 | End: 2019-04-08

## 2019-03-18 NOTE — PROGRESS NOTES
Nursing Notes:   Zainab Sen, NICOLE  3/18/2019  9:54 AM  Signed  Due to patient being non-English speaking/uses sign language, an  was used for this visit. Only for face-to-face interpretation by an external agency, date and length of interpretation can be found on the scanned worksheet.       name: Usama Malcolm  Language: Alexisong  Agency:  [employer]  Telephone number: [phone]  Type of interpretation:  Face-to-face, spoken      SUBJECTIVE  Jenise Paz is a 72 year old female with past medical history significant for    Patient Active Problem List   Diagnosis     Abnormal Pap smear of cervix     Cervical spondylosis     PTSD (post-traumatic stress disorder)     Hyperlipemia     Hypertension     Low back pain Chronic     DDD (degenerative disc disease), lumbar     Muscle Aches, Generalized (Myalgia)     Thyroid nodule     History of subtotal thyroidectomy     Vitamin D deficiency     Seasonal allergies     Noncompliance with medication regimen     Type 2 diabetes mellitus without complication, without long-term current use of insulin (H)     Coronary artery disease involving native coronary artery of native heart with unstable angina pectoris (H)     Anemia, iron deficiency     S/P CABG (coronary artery bypass graft)     Others present at the visit:  Patient's daughter.      Presents for   Chief Complaint   Patient presents with     Follow Up     Pt is here to follow up on shingles - this was the back pain that they later found out was Shingles.     Diabetes     Pt is here to follow up on her blood sugars and A1C.     Medication Reconciliation     Complete.      Patient presents for follow-up from a recent ER visit, where she was diagnosed with shingles.  She has been taking the Valtrex regularly, and thinks she has about 6-7 pills left.  May have missed a couple of pills.  The pain is on her right flank region, and radiates around to her back and her abdomen.  It is itchy, but also  painful, and burning.  The pain started 4-5 days before they went in, and then continued.  In the ER she was given oxycodone, Benadryl, and Valtrex.  She has been taking all 3 of them and finds them helpful.  Has difficulty with clothing that rubs on the area, as well as is unable to sleep on that side.    Otherwise she feels well.  No fevers or chills.  Did have a little bit of cough just before the rash started, but this has improved.  No dizziness, no abdominal pain, no constipation.    Family is requesting to discuss her diabetes today as well.  She reports that she is now taking her medications regularly.  Checks blood sugars are typically 150-160 in the morning.  Last A1c was about 2 months ago.  She has not been open to making changes in her medications.  Typically sees Dr. alonso for this.  They have not noticed any elevation in the blood sugars since developing shingles.      OBJECTIVE:  Vitals: /77 (BP Location: Left arm, Patient Position: Sitting, Cuff Size: Adult Large)   Pulse 81   Temp 98.6  F (37  C) (Oral)   Resp 16   SpO2 94%   BMI= There is no height or weight on file to calculate BMI.  Vitals:  Vitals are reviewed and are within the normal range  Gen:  Alert, pleasant, no acute distress  Cardiac:  Regular rate and rhythm, no murmurs, rubs or gallops  Respiratory:  Lungs clear to auscultation bilaterally  Abdomen:  Soft, non-tender, non-distended, bowel sounds positive.  Patient has a erythematous rash across her left flank.  All blisters have now crusted over.  Different stages of healing currently.  Extremities:  Warm, well-perfused, pulses 2+/4, no lower extremity edema      ASSESSMENT AND PLAN:      Jenise was seen today for follow up, diabetes and medication reconciliation.  Patient is still recovering from shingles.  Encouraged her to finish all of the valacyclovir in order to prevent postherpetic neuralgia.  I did write for 15 additional tabs of Percocet, given her continued experience  of pain.  Additionally, she is taking gabapentin 1 pill daily for chronic low back pain.  We will increase this to 1 pill twice daily to improve the pain control from the shingles.    Family is wanting to discuss diabetes today, however she has not made any changes in her medication, and is not due for an A1c for another month.  I recommended that they follow-up with Dr. alonso her regular provider to discuss this, as she is reaching the limit of oral medications and has been resistant to using insulin in the past.    Diagnoses and all orders for this visit:    Herpes zoster without complication  -     oxyCODONE-acetaminophen (PERCOCET) 5-325 MG tablet; Take 1 tablet by mouth every 6 hours as needed for pain    Chronic left-sided low back pain with left-sided sciatica  -     gabapentin (NEURONTIN) 300 MG capsule; Take 1 tablet (300 mg) every night for 3 days, then 1 tablet twice daily for 3 days, then 1 tablet three times daily        Patient Instructions   Take all of the Valcyclovir until they are gone.      Okay to continue to use the Percocet 2x per day as needed for the pain.      Also, increase the Gabapentin to 1 pill 3x per day to help with pain.            Malini Burt

## 2019-03-18 NOTE — PATIENT INSTRUCTIONS
Take all of the Valcyclovir until they are gone.      Okay to continue to use the Percocet 2x per day as needed for the pain.      Also, increase the Gabapentin to 1 pill 3x per day to help with pain.

## 2019-03-18 NOTE — NURSING NOTE
Due to patient being non-English speaking/uses sign language, an  was used for this visit. Only for face-to-face interpretation by an external agency, date and length of interpretation can be found on the scanned worksheet.       name: Usama Yun  Language: Jennifer  Agency:  [employer]  Telephone number: [phone]  Type of interpretation:  Face-to-face, spoken

## 2019-03-26 ENCOUNTER — OFFICE VISIT (OUTPATIENT)
Dept: FAMILY MEDICINE | Facility: CLINIC | Age: 74
End: 2019-03-26
Payer: MEDICARE

## 2019-03-26 VITALS
HEART RATE: 71 BPM | OXYGEN SATURATION: 99 % | BODY MASS INDEX: 33.55 KG/M2 | SYSTOLIC BLOOD PRESSURE: 133 MMHG | HEIGHT: 59 IN | WEIGHT: 166.4 LBS | TEMPERATURE: 99.3 F | RESPIRATION RATE: 18 BRPM | DIASTOLIC BLOOD PRESSURE: 69 MMHG

## 2019-03-26 DIAGNOSIS — E11.9 TYPE 2 DIABETES MELLITUS WITHOUT COMPLICATION, WITHOUT LONG-TERM CURRENT USE OF INSULIN (H): Primary | ICD-10-CM

## 2019-03-26 DIAGNOSIS — B02.9 HERPES ZOSTER WITHOUT COMPLICATION: ICD-10-CM

## 2019-03-26 LAB
BUN SERPL-MCNC: <4.7 MG/DL (ref 7–19)
CALCIUM SERPL-MCNC: 8.1 MG/DL (ref 8.5–10.1)
CHLORIDE SERPLBLD-SCNC: 98 MMOL/L (ref 98–110)
CHOLEST SERPL-MCNC: 168.2 MG/DL (ref 0–200)
CHOLEST/HDLC SERPL: 5 {RATIO} (ref 0–5)
CO2 SERPL-SCNC: 28.3 MMOL/L (ref 20–32)
CREAT SERPL-MCNC: 0.7 MG/DL (ref 0.5–1)
GFR SERPL CREATININE-BSD FRML MDRD: 87.9 ML/MIN/1.7 M2
GLUCOSE SERPL-MCNC: 278.4 MG'DL (ref 70–99)
HBA1C MFR BLD: 9.8 % (ref 4.1–5.7)
HDLC SERPL-MCNC: 33.8 MG/DL
LDLC SERPL CALC-MCNC: 100 MG/DL (ref 0–129)
POTASSIUM SERPL-SCNC: 4 MMOL/DL (ref 3.2–4.6)
SODIUM SERPL-SCNC: 135.4 MMOL/L (ref 132–142)
TRIGL SERPL-MCNC: 170.7 MG/DL (ref 0–150)
VLDL CHOLESTEROL: 34.1 MG/DL (ref 7–32)

## 2019-03-26 ASSESSMENT — MIFFLIN-ST. JEOR: SCORE: 1176.45

## 2019-03-26 NOTE — PROGRESS NOTES
Educated and demonstrated for patient the administration of insulin (Lantus pen). Patient understands to dial and inject 10 unit(s) subcutaneous at bedtime while rotating injection sites. Patient seems comfortable performing injection and was able to demonstrate all steps (cleaning injection site with alcohol pad, putting on needle, dialing to 10 unit(s), injecting at a 90 degree angle, disposing needle in sharps container, etc.). No questions or concerns at this time. Patient was encouraged to call the clinic if she has any questions/concerns - she understands.      Dina Perkins, PharmD Resident

## 2019-03-26 NOTE — PROGRESS NOTES
"    There are no exam notes on file for this visit.  Chief Complaint   Patient presents with     Diabetes     Pain     Shingles bothering her under left breast     Blood pressure 133/69, pulse 71, temperature 99.3  F (37.4  C), resp. rate 18, height 1.492 m (4' 10.75\"), weight 75.5 kg (166 lb 6.4 oz), SpO2 99 %.                 SUZAN Paz is a 72 year old  female with a PMH significant for:     Patient Active Problem List   Diagnosis     Abnormal Pap smear of cervix     Cervical spondylosis     PTSD (post-traumatic stress disorder)     Hyperlipemia     Hypertension     Low back pain Chronic     DDD (degenerative disc disease), lumbar     Muscle Aches, Generalized (Myalgia)     Thyroid nodule     History of subtotal thyroidectomy     Vitamin D deficiency     Seasonal allergies     Noncompliance with medication regimen     Type 2 diabetes mellitus without complication, without long-term current use of insulin (H)     Coronary artery disease involving native coronary artery of native heart with unstable angina pectoris (H)     Anemia, iron deficiency     S/P CABG (coronary artery bypass graft)     She presents with  Follow up diabetes and shingles.    Diagnosed with shingles earlier this month.  Finished valcyclovir and had oxycodone for pain in the short term as well.  She is still having discomfort and burning pain under the right breast where the shingles rash was.  Rash is improving.  She has gabapentin on her list and she was previously taking 300mg daily.  It appears that Dr. Burt increased dose to help with shingles pain at last visit, but she did not understand this.  She and her son do not think that she increased the dose.    For diabetes, she is checking sugars more regularly and taking medicines every day even though she does not like to.  WE discussed that this is helping because her A1c is down from 14.5% to 9.8% today. Congratulated her on this.  Discussed insulin again as I do think that this " "is what she needs to control her diabetes.  She agreed to once a day insulin.      We also discussed her labs in detail.  BUN is very low as is calcium.  I am worried that this is due to malnutrition from diet and possibly uncontrolled diabetes.  She does not feel that hungry and is mostly just eating rice porridge.  Discussed the need for balanced diet with protein and suggested some low fat protein sources for her since she does not like meat. Discussed diet for 10 min.       PMH, Medications and Allergies were reviewed and updated as needed.     A [employer]  was used for this visit. Son helped interpret. Declined need for .            Physical Exam:     Vitals:    03/26/19 1610   BP: 133/69   Pulse: 71   Resp: 18   Temp: 99.3  F (37.4  C)   SpO2: 99%   Weight: 75.5 kg (166 lb 6.4 oz)   Height: 1.492 m (4' 10.75\")     Body mass index is 33.9 kg/m .    Exam:  Constitutional: healthy, alert and no distress  Cardiovascular:RRR. No murmurs, clicks gallops or rub  Respiratory:  normal respiratory rate and rhythm, lungs clear to auscultation. No wheezes or crackles.  Extremities: No C/C/E in BLE. 2+DP pulses.  Skin: T5/6 dermatome under right breast has scabs over hyperpigmented areas where rash was. Healing no purulence.   Psychiatric: mentation appears normal and affect normal/bright    Results for orders placed or performed in visit on 03/26/19   Basic Metabolic Panel ([facility])   Result Value Ref Range    Urea Nitrogen <4.7 (L) 7.0 - 19.0 mg/dL    Calcium 8.1 (L) 8.5 - 10.1 mg/dL    Chloride 98.0 98.0 - 110.0 mmol/L    Carbon Dioxide 28.3 20.0 - 32.0 mmol/L    Creatinine 0.7 0.5 - 1.0 mg/dL    Glucose 278.4 (H) 70.0 - 99.0 mg'dL    Potassium 4.0 3.2 - 4.6 mmol/dL    Sodium 135.4 132.0 - 142.0 mmol/L    GFR Estimate 87.9 >60.0 mL/min/1.7 m2    GFR Estimate If Black >90 >60.0 mL/min/1.7 m2   Lipid Panel ([facility])   Result Value Ref Range    Cholesterol 168.2 0.0 - 200.0 mg/dL    Cholesterol/HDL Ratio " 5.0 0.0 - 5.0    HDL Cholesterol 33.8 (L) >40.0 mg/dL    LDL Cholesterol Calculated 100 0 - 129 mg/dL    Triglycerides 170.7 (H) 0.0 - 150.0 mg/dL    VLDL Cholesterol 34.1 (H) 7.0 - 32.0 mg/dL   Hemoglobin A1c ([facility])   Result Value Ref Range    Hemoglobin A1C 9.8 (H) 4.1 - 5.7 %       Assessment and Plan     Jenise was seen today for diabetes and pain.    Diagnoses and all orders for this visit:    Type 2 diabetes mellitus without complication, without long-term current use of insulin (H): Continue current medicines and Add glargine insulin 10 units at bedtime.  She will check sugars every morning before breakfast and bring in meter to next visit in 2 weeks so we can adjust her insulin.  Declined nutrition consult. Gave [facility] nutrition handout. Hopefully RNs to meet with her for longer at next visit to work on diet improvements for diabetes control and to improve overall nutrition.  Could use Glucerna supplement to help with protein and nutrient intake as well if she is having trouble eating.  PharmD helped teach her insulin injection.  -     Basic Metabolic Panel ([facility])  -     Lipid Panel ([facility])  -     Hemoglobin A1c ([facility])  -     insulin glargine (LANTUS SOLOSTAR PEN) 100 UNIT/ML pen; Inject 10 Units Subcutaneous At Bedtime  -     insulin pen needle (32G X 4 MM) 32G X 4 MM miscellaneous; Use 1 pen needles daily or as directed.    Herpes zoster without complication: RAsh healing. Discussed that it can take several weeks for the nerve pain to go away. Discussed how to increase gabapentin dose to help with the burning nerve pain.  Discussed that it is different than the oxycodone and works better over time.  Discussed Shingrix shot to prevent shingles in the future.        Patient Instructions   Increase gabapentin to two times a day for 3 days and then increase to 3 times a day.  If she gets too sleepy, increase more slowly.  This medicine helps with burning pain after you have been taking it three times a day  for a week or so.    It is not a medicine for acute pain. It is a medicine for nerve pain, burning pain.    Shingrix shot in the pharmacy is recommended.  Two shot series. You may feel sick for a couple days after the shot.  This is normal. Shots are one months apart.     Eat more protein: tofu, low fat dairy, almonds.  Can try to use Glucerna or other diabetes supplement.    Consider nutrition consult.    Follow up 2 weeks to adjust insulin.     Total of 40 minutes was spent in face to face contact with patient with > 50% in counseling and coordination of care.  Options for treatment and/or follow-up care were reviewed with the patient. Jenise Jackson was engaged and actively involved in the decision making process. She verbalized understanding of the options discussed and was satisfied with the final plan.    Afia Cortez MD

## 2019-03-26 NOTE — PATIENT INSTRUCTIONS
Increase gabapentin to two times a day for 3 days and then increase to 3 times a day.  If she gets too sleepy, increase more slowly.  This medicine helps with burning pain after you have been taking it three times a day for a week or so.    It is not a medicine for acute pain. It is a medicine for nerve pain, burning pain.    Shingrix shot in the pharmacy is recommended.  Two shot series. You may feel sick for a couple days after the shot.  This is normal. Shots are one months apart.     Eat more protein: tofu, low fat dairy, almonds.  Can try to use Glucerna or other diabetes supplement.    Consider nutrition consult.

## 2019-03-27 ENCOUNTER — TELEPHONE (OUTPATIENT)
Dept: FAMILY MEDICINE | Facility: CLINIC | Age: 74
End: 2019-03-27

## 2019-03-27 NOTE — NURSING NOTE
Gave patient's son the nutrition education book [employer].  Reviewed with son Healthy eating and what one's plate should be composed of with 1/2 plate vegetables, 1/4 plate rice/noodles, and 1/4 plate non-fatty meat.  Instructed son to call clinic with any questions.  Also let him know that we could schedule a nurse only visit to review nutrition and administration if concerns/questions.  Patient is scheduled on 4/8/19 in which additional teaching could also be done.    MOY Fraser RN

## 2019-03-27 NOTE — TELEPHONE ENCOUNTER
Attempted to call son and see if he or patient had any questions since yesterday's starting of insulin.  [facility].    MOY Fraser RN

## 2019-03-28 ENCOUNTER — TELEPHONE (OUTPATIENT)
Dept: FAMILY MEDICINE | Facility: CLINIC | Age: 74
End: 2019-03-28

## 2019-03-28 DIAGNOSIS — E11.9 TYPE 2 DIABETES MELLITUS WITHOUT COMPLICATION, WITHOUT LONG-TERM CURRENT USE OF INSULIN (H): Primary | ICD-10-CM

## 2019-03-29 RX ORDER — INSULIN GLARGINE 100 [IU]/ML
10 INJECTION, SOLUTION SUBCUTANEOUS DAILY
Qty: 12 ML | Refills: 3 | Status: SHIPPED | OUTPATIENT
Start: 2019-03-29 | End: 2019-04-08

## 2019-03-29 NOTE — TELEPHONE ENCOUNTER
Formulary Issue (Lantus not covered.  Change medication or start PA?  [phone] Patient ID # [id number])     I am covering tasks for Dr Cortez.    Discussed with PharmBARBARA.  Basaglar covered.  Rx sent

## 2019-03-29 NOTE — PROGRESS NOTES
I have verified the content of the note, which accurately reflects my assessment of the patient and the plan of care.   Liyah Tomas, PharmD

## 2019-04-08 ENCOUNTER — OFFICE VISIT (OUTPATIENT)
Dept: PHARMACY | Facility: CLINIC | Age: 74
End: 2019-04-08
Payer: MEDICARE

## 2019-04-08 ENCOUNTER — OFFICE VISIT (OUTPATIENT)
Dept: FAMILY MEDICINE | Facility: CLINIC | Age: 74
End: 2019-04-08
Payer: MEDICARE

## 2019-04-08 VITALS
RESPIRATION RATE: 16 BRPM | HEART RATE: 80 BPM | TEMPERATURE: 99 F | WEIGHT: 168 LBS | DIASTOLIC BLOOD PRESSURE: 82 MMHG | BODY MASS INDEX: 34.22 KG/M2 | SYSTOLIC BLOOD PRESSURE: 135 MMHG | OXYGEN SATURATION: 96 %

## 2019-04-08 DIAGNOSIS — E11.9 TYPE 2 DIABETES MELLITUS WITHOUT COMPLICATION, WITHOUT LONG-TERM CURRENT USE OF INSULIN (H): Primary | ICD-10-CM

## 2019-04-08 DIAGNOSIS — I25.110 CORONARY ARTERY DISEASE INVOLVING NATIVE CORONARY ARTERY OF NATIVE HEART WITH UNSTABLE ANGINA PECTORIS (H): ICD-10-CM

## 2019-04-08 DIAGNOSIS — B02.29 POSTHERPETIC NEURALGIA: ICD-10-CM

## 2019-04-08 DIAGNOSIS — B02.9 HERPES ZOSTER WITHOUT COMPLICATION: ICD-10-CM

## 2019-04-08 RX ORDER — LIDOCAINE 4 G/G
1 PATCH TOPICAL EVERY 24 HOURS
Qty: 12 PATCH | Refills: 1 | Status: SHIPPED | OUTPATIENT
Start: 2019-04-08 | End: 2019-04-22

## 2019-04-08 RX ORDER — OXYCODONE AND ACETAMINOPHEN 5; 325 MG/1; MG/1
1 TABLET ORAL EVERY 6 HOURS PRN
Qty: 15 TABLET | Refills: 0 | Status: SHIPPED | OUTPATIENT
Start: 2019-04-08 | End: 2019-04-22

## 2019-04-08 RX ORDER — INSULIN GLARGINE 100 [IU]/ML
14 INJECTION, SOLUTION SUBCUTANEOUS DAILY
Qty: 12 ML | Refills: 3 | Status: SHIPPED | OUTPATIENT
Start: 2019-04-08 | End: 2019-12-02

## 2019-04-08 NOTE — PATIENT INSTRUCTIONS
Bring your log book and medications to next visit.    Increase Insulin to 14 units.      Stop glipizide.     Continue to take gabapentin, check if you have more.    I will refill Oxycodone for another course. Try lidocaine patches as well on the painful skin.

## 2019-04-08 NOTE — PROGRESS NOTES
Medication Management Follow Up Note                                                       I am seeing Jenise Paz for follow up from a previous visit with the pharmacy team.        Subjective                                                       Patient reports the following problems or concerns with their medications:  none    Summary of Problems Addressed::    Patient presented with daughter to appointment. Follow-up on insulin new start.    Patient and daughter did not know that unused insulin pens need to be stored in the refrigerator and they are keeping all unused pens at room temperature.    Asked patient and daughter to demonstrate insulin administration, was informed the patient's son administers insulin but daughter sets up the rest of the patient's medication in a pill box. Daughter was unable to recall medications/doses and did not bring in medications.    I asked specifically about gabapentin and metformin, as the dosing on the label is a titration, and her daughter said she just puts in the pill box exactly what is written on the label.  Unable to definitively determine if she has titrated the gabapentin to TID or the metformin to 4 tablets daily.    She did not bring her glucometer but said AM fasting blood sugars have been about 160s.      Objective                                                       Estimated Creatinine Clearance: 90 mL/min (based on SCr of 0.7 mg/dL).    Lab Results   Component Value Date    A1C 9.8 03/26/2019    A1C 14.5 01/08/2019    A1C 12.2 10/23/2018    A1C 10.8 03/12/2018    A1C 7.2 11/07/2016     Last Comprehensive Metabolic Panel:  Sodium   Date Value Ref Range Status   03/26/2019 135.4 132.0 - 142.0 mmol/L Final     Potassium   Date Value Ref Range Status   03/26/2019 4.0 3.2 - 4.6 mmol/dL Final     Chloride   Date Value Ref Range Status   03/26/2019 98.0 98.0 - 110.0 mmol/L Final     Carbon Dioxide   Date Value Ref Range Status   03/26/2019 28.3 20.0 - 32.0 mmol/L Final      Glucose   Date Value Ref Range Status   03/26/2019 278.4 (H) 70.0 - 99.0 mg'dL Final     Urea Nitrogen   Date Value Ref Range Status   03/26/2019 <4.7 (L) 7.0 - 19.0 mg/dL Final     Creatinine   Date Value Ref Range Status   03/26/2019 0.7 0.5 - 1.0 mg/dL Final     GFR Estimate   Date Value Ref Range Status   03/26/2019 87.9 >60.0 mL/min/1.7 m2 Final     Calcium   Date Value Ref Range Status   03/26/2019 8.1 (L) 8.5 - 10.1 mg/dL Final       BP Readings from Last 3 Encounters:   04/08/19 135/82   03/26/19 133/69   03/18/19 118/77       The 10-year ASCVD risk score (Cori SHELDON Jr., et al., 2013) is: 26.2%    Values used to calculate the score:      Age: 72 years      Sex: Female      Is Non- : No      Diabetic: Yes      Tobacco smoker: No      Systolic Blood Pressure: 135 mmHg      Is BP treated: Yes      HDL Cholesterol: 33.8 mg/dL      Total Cholesterol: 168.2 mg/dL    PHQ-9 score:    PHQ-9 SCORE 5/15/2014   PHQ-9 Total Score 7         Assessment                                                       DM2 -  uncontrolled     Patient's A1c has improved significantly from 14.5% in January 2019 to 9.8% (3/26/19).    Fasting BS above goal of  mg/dL    Shingles -  uncontrolled     Patient currently has shingles      Plan/Recommendations                                                       Completed at this visit     DM2    Educated on proper insulin storage and re-educated daughter and patient on proper insulin administration, so daughter can administer if neeeded    Educated that pens stored out of the refrigerator are only good for 28 days and cannot be used beyond that, and from now on should put all insulin in the fridge except for pen currently being used.    Discontinue glipizide due to duplicate mechanisms and risks of hypoglycemia with insulin    Dr. Cortez increased Basaglar to 14 units daily    Shingles    Educated on the benefits of the Shingrix vaccination and how to obtain  vaccination      To be completed at a future visit  DM2    Have patient bring in meter/log book to assess blood sugars and titrate Basaglar as needed.    Check a B12 level due to metformin therapy and neuropathy.    Medication reconciliation    Have patient bring in medications/pill box for a complete medication reconciliation.      Follow-up                                                       Patient should follow up as needed with Dr. Cortez.      Dr. Cortez was provided the recommendations above  in clinic today and was available for supervision during this visit and is the authorizing prescriber for this visit through the pharmacist collaborative practice agreement.    Tania Pryor, PharmD Student      Drug therapy problems identified  1. Med: glipizide - Safety - drug interaction - Resolution: Discontinue Drug; resolved  2. Med: Basaglar - Efficacy  - dose too low - Resolution: Change dose; resolved   3. Med: Shingrix - Indication - needs additional therapy - Resolution: Educate patient; resolved     # of medical conditions addressed: 2  # of medications addressed: 5  # of medication discrepancies identified: did not assess  # of DTP identified: 3  Time spent: 30 minutes  Level of service: 3    I was present with the pharmacy student who participated in the service and in the documentation of this note. I have verified the history, personally performed the medical decision making, and have verified the content of the note, which accurately reflects my assessment of the patient and the plan of care.   Beth De Leon, PharmD

## 2019-04-08 NOTE — PROGRESS NOTES
There are no exam notes on file for this visit.  Chief Complaint   Patient presents with     Diabetes     Pt is here to follow up on diabetes today.     Medication Reconciliation     Complete.      Blood pressure 135/82, pulse 80, temperature 99  F (37.2  C), temperature source Oral, resp. rate 16, weight 76.2 kg (168 lb), SpO2 96 %.                 HPI     Jenise Paz is a 72 year old  female with a PMH significant for:     Patient Active Problem List   Diagnosis     Abnormal Pap smear of cervix     Cervical spondylosis     PTSD (post-traumatic stress disorder)     Hyperlipemia     Hypertension     Low back pain Chronic     DDD (degenerative disc disease), lumbar     Muscle Aches, Generalized (Myalgia)     Thyroid nodule     History of subtotal thyroidectomy     Vitamin D deficiency     Seasonal allergies     Noncompliance with medication regimen     Type 2 diabetes mellitus without complication, without long-term current use of insulin (H)     Coronary artery disease involving native coronary artery of native heart with unstable angina pectoris (H)     Anemia, iron deficiency     S/P CABG (coronary artery bypass graft)     She presents with follow up of diabetes.  She did start the insulin once at night and it is going okay.  No low sugars that she is aware of.  Fastings are in the 160s.  She did not bring her meter today.  RN met with her to discuss diabetes diet as well and to help her with the log book.  She has been eating poorly due to pain and has very little protein in her diet.  Very low BUn in the past. PharmD discussed insulin again to make sure she was doing okay with the technique.    She continues to have significant pain from shingles. It is on the left back and side. Burning in nature.  It is keeping her from getting out of bed and from eating well and interferes with sleeping.  It is still unclear what dose of gabapentin she is doing.  She is wondering what else she can do for her pain.    PMH,  Medications and Allergies were reviewed and updated as needed.     A [employer]  was used for this visit           Physical Exam:     Vitals:    04/08/19 1111   BP: 135/82   BP Location: Left arm   Patient Position: Sitting   Cuff Size: Adult Large   Pulse: 80   Resp: 16   Temp: 99  F (37.2  C)   TempSrc: Oral   SpO2: 96%   Weight: 76.2 kg (168 lb)     Body mass index is 34.22 kg/m .    Exam:  Constitutional: healthy, alert and no distress  Cardiovascular:RRR. No murmurs, clicks gallops or rub  Respiratory:  normal respiratory rate and rhythm, lungs clear to auscultation. No wheezes or crackles.  Extremities: No C/C/E in BLE.    Skin: no rashes. No shingles lesions but does have hyperpigmented patches where her rash was and danii is where her pain is as well.  Psychiatric: mentation appears normal and affect normal/bright        Results for orders placed or performed in visit on 03/26/19   Basic Metabolic Panel ([facility])   Result Value Ref Range    Urea Nitrogen <4.7 (L) 7.0 - 19.0 mg/dL    Calcium 8.1 (L) 8.5 - 10.1 mg/dL    Chloride 98.0 98.0 - 110.0 mmol/L    Carbon Dioxide 28.3 20.0 - 32.0 mmol/L    Creatinine 0.7 0.5 - 1.0 mg/dL    Glucose 278.4 (H) 70.0 - 99.0 mg'dL    Potassium 4.0 3.2 - 4.6 mmol/dL    Sodium 135.4 132.0 - 142.0 mmol/L    GFR Estimate 87.9 >60.0 mL/min/1.7 m2    GFR Estimate If Black >90 >60.0 mL/min/1.7 m2   Lipid Panel ([facility])   Result Value Ref Range    Cholesterol 168.2 0.0 - 200.0 mg/dL    Cholesterol/HDL Ratio 5.0 0.0 - 5.0    HDL Cholesterol 33.8 (L) >40.0 mg/dL    LDL Cholesterol Calculated 100 0 - 129 mg/dL    Triglycerides 170.7 (H) 0.0 - 150.0 mg/dL    VLDL Cholesterol 34.1 (H) 7.0 - 32.0 mg/dL   Hemoglobin A1c ([facility])   Result Value Ref Range    Hemoglobin A1C 9.8 (H) 4.1 - 5.7 %       Assessment and Plan     Jenise was seen today for diabetes and medication reconciliation.    Diagnoses and all orders for this visit:    Type 2 diabetes mellitus without complication, without  long-term current use of insulin (H):  Stop glipizide due to starting insulin. We will increase insulin and see her back soon to readjust insulin again. Continue diet education with RN and monitor for hypoglycemia.  -     insulin glargine (BASAGLAR KWIKPEN) 100 UNIT/ML pen; Inject 14 Units Subcutaneous daily    Herpes zoster  Postherpetic neuralgia: one month since rash started. Pain is still significant and impairing her functionally.  We will try lidocaine and another short prescription of opioids to use sparingly.  Reiterated that she should continue gabapentin. Bring meds to the next visit so we can verfiy what she is taking.  -     oxyCODONE-acetaminophen (PERCOCET) 5-325 MG tablet; Take 1 tablet by mouth every 6 hours as needed for pain  -     Lidocaine (LIDOCARE) 4 % Patch; Place 1 patch onto the skin every 24 hours        Patient Instructions   Bring your log book and medications to next visit.    Increase Insulin to 14 units.      Stop glipizide.     Continue to take gabapentin, check if you have more.    I will refill Oxycodone for another course. Try lidocaine patches as well on the painful skin.       Options for treatment and/or follow-up care were reviewed with the patient. Jenise Paz was engaged and actively involved in the decision making process. She verbalized understanding of the options discussed and was satisfied with the final plan.    Afia Cortez MD

## 2019-04-12 ENCOUNTER — TELEPHONE (OUTPATIENT)
Dept: FAMILY MEDICINE | Facility: CLINIC | Age: 74
End: 2019-04-12

## 2019-04-12 DIAGNOSIS — E11.9 TYPE 2 DIABETES MELLITUS WITHOUT COMPLICATION, WITHOUT LONG-TERM CURRENT USE OF INSULIN (H): Primary | ICD-10-CM

## 2019-04-12 DIAGNOSIS — E11.9 TYPE 2 DIABETES MELLITUS WITHOUT COMPLICATION, WITHOUT LONG-TERM CURRENT USE OF INSULIN (H): ICD-10-CM

## 2019-04-12 NOTE — TELEPHONE ENCOUNTER
[facility].    At last visit, checking BID, fasting and 2 hr after a meal.  Was given lidocaine--is this effective?  Check insulin dosing.  F/u in clinic on 4/22.    /MRON Cifuentes

## 2019-04-22 ENCOUNTER — TELEPHONE (OUTPATIENT)
Dept: FAMILY MEDICINE | Facility: CLINIC | Age: 74
End: 2019-04-22

## 2019-04-22 ENCOUNTER — OFFICE VISIT (OUTPATIENT)
Dept: FAMILY MEDICINE | Facility: CLINIC | Age: 74
End: 2019-04-22
Payer: MEDICARE

## 2019-04-22 VITALS
TEMPERATURE: 98.6 F | WEIGHT: 168.6 LBS | SYSTOLIC BLOOD PRESSURE: 113 MMHG | RESPIRATION RATE: 16 BRPM | BODY MASS INDEX: 34.34 KG/M2 | DIASTOLIC BLOOD PRESSURE: 68 MMHG | HEART RATE: 71 BPM

## 2019-04-22 DIAGNOSIS — E11.9 TYPE 2 DIABETES MELLITUS WITHOUT COMPLICATION, WITHOUT LONG-TERM CURRENT USE OF INSULIN (H): Primary | ICD-10-CM

## 2019-04-22 DIAGNOSIS — J30.2 CHRONIC SEASONAL ALLERGIC RHINITIS: ICD-10-CM

## 2019-04-22 DIAGNOSIS — B02.9 HERPES ZOSTER WITHOUT COMPLICATION: ICD-10-CM

## 2019-04-22 RX ORDER — METFORMIN HCL 500 MG
2000 TABLET, EXTENDED RELEASE 24 HR ORAL
Qty: 360 TABLET | Refills: 4 | Status: SHIPPED | OUTPATIENT
Start: 2019-04-22 | End: 2020-05-29

## 2019-04-22 RX ORDER — LORATADINE 10 MG/1
10 TABLET ORAL DAILY PRN
Qty: 90 TABLET | Refills: 4 | Status: SHIPPED | OUTPATIENT
Start: 2019-04-22 | End: 2020-05-29

## 2019-04-22 NOTE — TELEPHONE ENCOUNTER
[facility] phone call message- general phone call:    Reason for call: She is returning you phone call from last week.    Return call needed: Yes    OK to leave a message on voice mail? Yes    Primary language: Hmong      needed? Yes    Call taken on April 22, 2019 at 12:32 PM by Theresa Andrew

## 2019-04-22 NOTE — PATIENT INSTRUCTIONS
Increase insulin to 14 units at night.   Okay to take a different time, just make it around the same time every day.

## 2019-04-22 NOTE — TELEPHONE ENCOUNTER
Attempted to call the pt and her adult children a few times the last week.    Received a call back from daughter-in-law, Saida. Pt's pain is much better since she has been using lidocaine. However, she hasn't been able to check her blood sugars because test strips were not covered. Advised to keep appt for today. Saida thinks pt is currently on 12 units of basaglar. She will verify with her  about how much insulin she is getting daily.     I called [facility] to inquire about the pt's test strips. Apparently, pt's insurance switched over to [employer] and they need new generic scripts for meter, lancets, and test strips. I send all new scripts to the pharmacy so pt can pick this up today.     appt with Dr. Cortez today at 4:10 pm.    Routed to Dr. Cortez. /RON Bullock

## 2019-04-22 NOTE — PROGRESS NOTES
There are no exam notes on file for this visit.  Chief Complaint   Patient presents with     Diabetes     RECHECK     Blood pressure 113/68, pulse 71, temperature 98.6  F (37  C), resp. rate 16, weight 76.5 kg (168 lb 9.6 oz).             SUZAN Paz is a 72 year old  female with a PMH significant for:     Patient Active Problem List   Diagnosis     Abnormal Pap smear of cervix     Cervical spondylosis     PTSD (post-traumatic stress disorder)     Hyperlipemia     Hypertension     Low back pain Chronic     DDD (degenerative disc disease), lumbar     Muscle Aches, Generalized (Myalgia)     Thyroid nodule     History of subtotal thyroidectomy     Vitamin D deficiency     Seasonal allergies     Noncompliance with medication regimen     Type 2 diabetes mellitus without complication, without long-term current use of insulin (H)     Coronary artery disease involving native coronary artery of native heart with unstable angina pectoris (H)     Anemia, iron deficiency     S/P CABG (coronary artery bypass graft)     She presents with diabetes follow up since starting insulin recently and follow up on shingles pain.    Shingles pain is much better did not get the lidocaine.  She has been taking gabapentin three times a day.  There was a switch in her supplemental insurance which messed up her medication coverage.    Taking basaglar 12 units  after lunch. She goes to bed at 11PM.    Fasting sugars in 130s-140s.  Postprandials in 190-230s. No lows or symptoms of lows.  Does not have many days recorded due to problem with getting strips refilled. They were refilled today.    She is having some allergy sxs and would like her claritin refilled as well.      PMH, Medications and Allergies were reviewed and updated as needed.     [employer]  was used for this visit           Physical Exam:     Vitals:    04/22/19 1628   BP: 113/68   Pulse: 71   Resp: 16   Temp: 98.6  F (37  C)   Weight: 76.5 kg (168 lb 9.6 oz)      Body mass index is 34.34 kg/m .    Exam:  Constitutional: healthy, alert and no distress  Cardiovascular:RRR. No murmurs, clicks gallops or rub  Respiratory:  normal respiratory rate and rhythm, lungs clear to auscultation. No wheezes or crackles.  Extremities: No C/C/E in BLE. Skin: Shingles almost completely healed, hyperpigmented areas still seen on left chest wall.  Psychiatric: mentation appears normal and affect normal/bright        Results for orders placed or performed in visit on 03/26/19   Basic Metabolic Panel ([facility])   Result Value Ref Range    Urea Nitrogen <4.7 (L) 7.0 - 19.0 mg/dL    Calcium 8.1 (L) 8.5 - 10.1 mg/dL    Chloride 98.0 98.0 - 110.0 mmol/L    Carbon Dioxide 28.3 20.0 - 32.0 mmol/L    Creatinine 0.7 0.5 - 1.0 mg/dL    Glucose 278.4 (H) 70.0 - 99.0 mg'dL    Potassium 4.0 3.2 - 4.6 mmol/dL    Sodium 135.4 132.0 - 142.0 mmol/L    GFR Estimate 87.9 >60.0 mL/min/1.7 m2    GFR Estimate If Black >90 >60.0 mL/min/1.7 m2   Lipid Panel ([facility])   Result Value Ref Range    Cholesterol 168.2 0.0 - 200.0 mg/dL    Cholesterol/HDL Ratio 5.0 0.0 - 5.0    HDL Cholesterol 33.8 (L) >40.0 mg/dL    LDL Cholesterol Calculated 100 0 - 129 mg/dL    Triglycerides 170.7 (H) 0.0 - 150.0 mg/dL    VLDL Cholesterol 34.1 (H) 7.0 - 32.0 mg/dL   Hemoglobin A1c ([facility])   Result Value Ref Range    Hemoglobin A1C 9.8 (H) 4.1 - 5.7 %       Assessment and Plan     Jenise was seen today for diabetes and recheck.    Diagnoses and all orders for this visit:    Type 2 diabetes mellitus without complication, without long-term current use of insulin (H):Continue metformin and other medications.  Increase insulin to 14 units. WE will perhaps need to increase more, but I would like more data before doing so.  Should be able to get strip today.  -     metFORMIN (GLUCOPHAGE-XR) 500 MG 24 hr tablet; Take 4 tablets (2,000 mg) by mouth daily with food    Herpes zoster without complication: pain improving.  No longer needs lidocaine or  oxycodone which she never got.  Continue gabapentin for now as may also help her chronic back pain.    Chronic seasonal allergic rhinitis: refilled medication.  -     loratadine (CLARITIN) 10 MG tablet; Take 1 tablet (10 mg) by mouth daily as needed for allergies        Patient Instructions   Increase insulin to 14 units at night.   Okay to take a different time, just make it around the same time every day.      Return in about 2 weeks (around 5/6/2019) for diabetes..     Options for treatment and/or follow-up care were reviewed with the patient. Jenise Paz was engaged and actively involved in the decision making process. She verbalized understanding of the options discussed and was satisfied with the final plan.    Afia Cortez MD

## 2019-05-20 DIAGNOSIS — E11.9 TYPE 2 DIABETES MELLITUS WITHOUT COMPLICATION, WITHOUT LONG-TERM CURRENT USE OF INSULIN (H): Primary | ICD-10-CM

## 2019-11-05 ENCOUNTER — HEALTH MAINTENANCE LETTER (OUTPATIENT)
Age: 74
End: 2019-11-05

## 2019-11-19 ENCOUNTER — TELEPHONE (OUTPATIENT)
Dept: FAMILY MEDICINE | Facility: CLINIC | Age: 74
End: 2019-11-19

## 2019-11-20 NOTE — TELEPHONE ENCOUNTER
Answering Service Call     Reason: High glucose    The machine says high and there isn't any actual number. The company states this is over 600 then. She is thirsty lately and using the bathroom a lot. She is unsure if she is confused. Her daughter in law is unsure if she has changes in her vision or other issues.    Discussed that this could be a life threatening level of glucose and it's hard to tell if this is something that can be treated with insulin or needs fluids and a hospital stay. Daughter plans to bring her to the [facility].    Signed out to [facility] doc at 9:06 PM.    Soledad Dickinson MD  11/19/19 9:08 PM

## 2019-11-21 DIAGNOSIS — I25.110 CORONARY ARTERY DISEASE INVOLVING NATIVE CORONARY ARTERY OF NATIVE HEART WITH UNSTABLE ANGINA PECTORIS (H): ICD-10-CM

## 2019-11-21 DIAGNOSIS — E11.9 TYPE 2 DIABETES MELLITUS WITHOUT COMPLICATION, WITHOUT LONG-TERM CURRENT USE OF INSULIN (H): ICD-10-CM

## 2019-11-22 RX ORDER — SITAGLIPTIN 100 MG/1
TABLET, FILM COATED ORAL
Qty: 90 TABLET | Refills: 3 | Status: SHIPPED | OUTPATIENT
Start: 2019-11-22 | End: 2019-12-09 | Stop reason: DRUGHIGH

## 2019-11-22 RX ORDER — LISINOPRIL 2.5 MG/1
TABLET ORAL
Qty: 90 TABLET | Refills: 3 | Status: SHIPPED | OUTPATIENT
Start: 2019-11-22 | End: 2020-12-16

## 2019-12-02 ENCOUNTER — OFFICE VISIT (OUTPATIENT)
Dept: FAMILY MEDICINE | Facility: CLINIC | Age: 74
End: 2019-12-02
Payer: MEDICARE

## 2019-12-02 VITALS
OXYGEN SATURATION: 96 % | WEIGHT: 174.2 LBS | DIASTOLIC BLOOD PRESSURE: 65 MMHG | TEMPERATURE: 98.9 F | RESPIRATION RATE: 18 BRPM | BODY MASS INDEX: 35.12 KG/M2 | HEART RATE: 77 BPM | SYSTOLIC BLOOD PRESSURE: 100 MMHG | HEIGHT: 59 IN

## 2019-12-02 DIAGNOSIS — Z00.00 PREVENTATIVE HEALTH CARE: Primary | ICD-10-CM

## 2019-12-02 DIAGNOSIS — Z23 NEED FOR PROPHYLACTIC VACCINATION AND INOCULATION AGAINST INFLUENZA: ICD-10-CM

## 2019-12-02 DIAGNOSIS — E11.9 TYPE 2 DIABETES MELLITUS WITHOUT COMPLICATION, WITHOUT LONG-TERM CURRENT USE OF INSULIN (H): ICD-10-CM

## 2019-12-02 LAB
BUN SERPL-MCNC: 10.4 MG/DL (ref 7–19)
CALCIUM SERPL-MCNC: 8.2 MG/DL (ref 8.5–10.1)
CHLORIDE SERPLBLD-SCNC: 98.9 MMOL/L (ref 98–110)
CO2 SERPL-SCNC: 27.7 MMOL/L (ref 20–32)
CREAT SERPL-MCNC: 0.6 MG/DL (ref 0.5–1)
GFR SERPL CREATININE-BSD FRML MDRD: >90 ML/MIN/1.7 M2
GLUCOSE SERPL-MCNC: 514.6 MG'DL (ref 70–99)
HBA1C MFR BLD: 14.7 % (ref 4.1–5.7)
POTASSIUM SERPL-SCNC: 3.8 MMOL/DL (ref 3.2–4.6)
SODIUM SERPL-SCNC: 131.6 MMOL/L (ref 132–142)

## 2019-12-02 RX ORDER — INSULIN GLARGINE 100 [IU]/ML
16 INJECTION, SOLUTION SUBCUTANEOUS DAILY
Qty: 12 ML | Refills: 3 | Status: SHIPPED | OUTPATIENT
Start: 2019-12-02 | End: 2019-12-09

## 2019-12-02 ASSESSMENT — MIFFLIN-ST. JEOR: SCORE: 1210.8

## 2019-12-02 NOTE — PATIENT INSTRUCTIONS
Check your blood sugars twice a day.  Once in the morning before eating and once before bed.     new medication (Bydureon/exanitide) and bring with you to the next visit. Continue taking Januvia and Metformin at current doses.    Increase insulin to 16 units at bedtime.    We will need to see you about every 2 weeks until we have your sugars better controlled.    12/03/19  OPHTHALMOLOGY ADULT REFERRAL    [facility]  Phone:[phone]  Fax:  [phone]    Faxed demographics and referral to [phone], they will contact patient to schedule.     Manda King

## 2019-12-02 NOTE — RESULT ENCOUNTER NOTE
Your blood sugar is high as we expected, 514.  I am hoping the increase in insulin will help decrease these levels.  We will adjust your medications more at your next visit and discuss what you are eating as well.  Please bring your medications and your meter to your next visit as we discussed today.    Your other electrolytes and kidney function are normal.

## 2019-12-02 NOTE — PROGRESS NOTES
"    Nursing Notes:   Murray ZepedaNICOLE  12/2/2019 12:04 PM  Signed  Due to patient being non-English speaking/uses sign language, an  was used for this visit. Only for face-to-face interpretation by an external agency, date and length of interpretation can be found on the scanned worksheet.       name:  Ofe Philip  Agency:  [employer]  Language: jude  Telephone number: [phone]  Type of interpretation:  Face-to-face, spoken    Chief Complaint   Patient presents with     Hospital F/U     f/u from ER for DM     Medication Reconciliation     unable to med rec. pt did not bring meds to clinic     Imm/Inj     Flu Shot     Blood pressure 100/65, pulse 77, temperature 98.9  F (37.2  C), temperature source Oral, resp. rate 18, height 1.499 m (4' 11\"), weight 79 kg (174 lb 3.2 oz), SpO2 96 %.                 SUZAN Paz is a 73 year old  female with a PMH significant for:     Patient Active Problem List   Diagnosis     Abnormal Pap smear of cervix     Cervical spondylosis     PTSD (post-traumatic stress disorder)     Hyperlipemia     Hypertension     Low back pain Chronic     DDD (degenerative disc disease), lumbar     Muscle Aches, Generalized (Myalgia)     Thyroid nodule     History of subtotal thyroidectomy     Vitamin D deficiency     Seasonal allergies     Noncompliance with medication regimen     Type 2 diabetes mellitus without complication, without long-term current use of insulin (H)     Coronary artery disease involving native coronary artery of native heart with unstable angina pectoris (H)     Anemia, iron deficiency     S/P CABG (coronary artery bypass graft)     She presents with  Uncontrolled diabetes.      Checking BG every night before insulin and it is running 200s to 500s.  It has been running this high for a week. Before that it was in the 100-200s.  She went to the ER last week and her blood sugar was in the 700s.  They discussed that this can be very dangerous.  She has " "been having poor appetite and some blurry vision.  Otherwise feeling tired but not ill.  She does state that she has been taking medications as per scribed.  This includes metformin and Januvia.  She has been taking Basaglar 10 units at bedtime.  Her daughter-in-law is giving it to her every night.  Daughter-in-law is checking blood sugar before giving insulin as well.  Patient does know how to check her blood sugar but sometimes forgets.Does not check in the AM because family is working in the morning. No hypoglycemia sxs.  No chest pain, shortness of breath, weakness numbness tingling, swelling in the legs.      PMH, Medications and Allergies were reviewed and updated as needed.     A [employer]  was used for this visit           Physical Exam:     Vitals:    12/02/19 1117   BP: 100/65   Pulse: 77   Resp: 18   Temp: 98.9  F (37.2  C)   TempSrc: Oral   SpO2: 96%   Weight: 79 kg (174 lb 3.2 oz)   Height: 1.499 m (4' 11\")     Body mass index is 35.18 kg/m .    Exam:  Constitutional: healthy, alert and no distress  Neck: Neck supple. No adenopathy. Thyroid symmetric, normal size,  Eyes: PERRLA, EOMI, conjunctiva are clear.  ENT: No nasal discharge. TMs clear, Oropharynx clear with no erythema or exudates.  Cardiovascular:RRR. No murmurs, clicks gallops or rub  Respiratory:  normal respiratory rate and rhythm, lungs clear to auscultation. No wheezes or crackles.  Abdomen: +BS, soft, nontender, nondistended. No HSM.  Extremities: No C/C/E in BLE. 2+DP pulses.  Joint exam without erythema, effusion and full ROM throughout.  Skin: no rashes.  Psychiatric: mentation appears normal and affect normal/bright    No flowsheet data found.  PHQ-9 SCORE 5/15/2014   PHQ-9 Total Score 7     Results for orders placed or performed in visit on 12/02/19   Hemoglobin A1c ([facility])     Status: Abnormal   Result Value Ref Range    Hemoglobin A1C 14.7 (H) 4.1 - 5.7 %       Assessment and Plan     Jenise was seen today for hospital f/u, " medication reconciliation and imm/inj.    Diagnoses and all orders for this visit:    Preventative health care  -     OPHTHALMOLOGY ADULT REFERRAL; Future    Type 2 diabetes mellitus without complication, without long-term current use of insulin (H): Currently poorly controlled.  Discussed she likely is running out of her own insulin and will  Be asked switching her to a GLP-1 injection as this is more effective at lowering blood sugars after meals.  This may give us time before needing to start mealtime insulin.  Sent by [facility] to pharmacy and will check if it is covered if not would send alternative that would be covered.  She should continue Januvia until the swelling goes through.  She will bring it to the next visit and Pharm.D. to help her learn to use it.  Increase Basaglar dose 16 units at bedtime.    Discussed the importance of checking blood sugar in the morning so that we can adjust her insulin better.  She will have to remember this herself.  We discussed placing the meter near her bed or toothbrush might be helpful.  Should bring her meter and all meds to next visit.  -     Hemoglobin A1c ([facility])  -     Basic Metabolic Panel ([facility])  -     OPHTHALMOLOGY ADULT REFERRAL; Future  -     blood glucose (NO BRAND SPECIFIED) test strip; Use to test blood sugar 2 times daily or as directed.  -     insulin pen needle (32G X 4 MM) 32G X 4 MM miscellaneous; Use 1 pen needles daily or as directed.  -     insulin glargine (BASAGLAR KWIKPEN) 100 UNIT/ML pen; Inject 16 Units Subcutaneous daily  -     exenatide ER (BYDUREON) 2 MG pen; Inject 2 mg Subcutaneous every 7 days    Need for prophylactic vaccination and inoculation against influenza  -     Fluzone high dose, 65+ years [54800]        Patient Instructions   Check your blood sugars twice a day.  Once in the morning before eating and once before bed.     new medication (Bydureon/exanitide) and bring with you to the next visit. Continue taking Januvia and  Metformin at current doses.    Increase insulin to 16 units at bedtime.    We will need to see you about every 2 weeks until we have your sugars better controlled.    Return in about 2 weeks (around 12/16/2019) for Diabetes.     Options for treatment and/or follow-up care were reviewed with the patient. Jenise Paz was engaged and actively involved in the decision making process. She verbalized understanding of the options discussed and was satisfied with the final plan.    Afia Cortez MD

## 2019-12-02 NOTE — NURSING NOTE
Due to patient being non-English speaking/uses sign language, an  was used for this visit. Only for face-to-face interpretation by an external agency, date and length of interpretation can be found on the scanned worksheet.       name:  Ofe Philip  Agency:  [employer]  Language: Jennifer  Telephone number: [phone]  Type of interpretation:  Face-to-face, spoken

## 2019-12-09 ENCOUNTER — OFFICE VISIT (OUTPATIENT)
Dept: FAMILY MEDICINE | Facility: CLINIC | Age: 74
End: 2019-12-09
Payer: MEDICARE

## 2019-12-09 ENCOUNTER — OFFICE VISIT (OUTPATIENT)
Dept: PHARMACY | Facility: PHYSICIAN GROUP | Age: 74
End: 2019-12-09
Payer: COMMERCIAL

## 2019-12-09 VITALS
HEART RATE: 75 BPM | SYSTOLIC BLOOD PRESSURE: 132 MMHG | WEIGHT: 172 LBS | BODY MASS INDEX: 34.74 KG/M2 | TEMPERATURE: 98.8 F | DIASTOLIC BLOOD PRESSURE: 64 MMHG | RESPIRATION RATE: 18 BRPM

## 2019-12-09 DIAGNOSIS — E11.9 TYPE 2 DIABETES MELLITUS WITHOUT COMPLICATION, WITHOUT LONG-TERM CURRENT USE OF INSULIN (H): Primary | ICD-10-CM

## 2019-12-09 PROCEDURE — 99207 ZZC NO CHARGE LOS: CPT | Performed by: PHARMACIST

## 2019-12-09 RX ORDER — INSULIN GLARGINE 100 [IU]/ML
20 INJECTION, SOLUTION SUBCUTANEOUS DAILY
Qty: 12 ML | Refills: 3 | Status: SHIPPED | OUTPATIENT
Start: 2019-12-09 | End: 2019-12-30

## 2019-12-09 NOTE — PROGRESS NOTES
Nursing Notes:   Jessica Garcia, [facility]  12/9/2019  4:13 PM  Signed  Due to patient being non-English speaking/uses sign language, an  was used for this visit. Only for face-to-face interpretation by an external agency, date and length of interpretation can be found on the scanned worksheet.     name: Mike Philip  Agency: [employer]  Language: Jennifer   Telephone number: [phone]  Type of interpretation: Face-to-face, spoken  Chief Complaint   Patient presents with     Diabetes     Blood pressure 132/64, pulse 75, temperature 98.8  F (37.1  C), resp. rate 18, weight 78 kg (172 lb).                 SUZAN Paz is a 73 year old  female with a PMH significant for:     Patient Active Problem List   Diagnosis     Abnormal Pap smear of cervix     Cervical spondylosis     PTSD (post-traumatic stress disorder)     Hyperlipemia     Hypertension     Low back pain Chronic     DDD (degenerative disc disease), lumbar     Muscle Aches, Generalized (Myalgia)     Thyroid nodule     History of subtotal thyroidectomy     Vitamin D deficiency     Seasonal allergies     Noncompliance with medication regimen     Type 2 diabetes mellitus without complication, without long-term current use of insulin (H)     Coronary artery disease involving native coronary artery of native heart with unstable angina pectoris (H)     Anemia, iron deficiency     S/P CABG (coronary artery bypass graft)     She presents with follow-up of diabetes.  Saw her recently for hyperglycemia and poorly controlled diabetes.  She was having a lot of symptoms at the last visit including polydipsia and polyuria and fatigue.  We increased her insulin to 16 units and ordered by kenyon.  She has not yet started Bydureon and brings it today to be taught how to use it.  She is here with daughter-in-law to help with this.  Daughter-in-law is the one that gives her her insulin currently and will be they want to give Bydureon as well.    Forgot  meter this afternoon.  They had come this morning thinking the visit was at 11 AM and had it at that time but then forgot to bring it this afternoon.  She has been checking twice a day.  Previously had only been checking at nighttime.  Averages have decreased as she was getting unmeasurable and sugars over 500 before.  Now daughter-in-law states that sugars are 200-300 at bedtime.   The patient states that sugars are 130-300 fasting in the AM. No hypoglycemic sxs.  Does feel thirsty sometimes.  These symptoms are improving. No CP or SOB.     PMH, Medications and Allergies were reviewed and updated as needed.     A [employer]  was used for this visit           Physical Exam:     Vitals:    12/09/19 1610   BP: 132/64   Pulse: 75   Resp: 18   Temp: 98.8  F (37.1  C)   Weight: 78 kg (172 lb)     Body mass index is 34.74 kg/m .    Exam:  Constitutional: healthy, alert and no distress  Eyes:  conjunctiva are clear.  ENT: No nasal discharge. TMs clear, Oropharynx clear with no erythema or exudates.  Cardiovascular:RRR. No murmurs, clicks gallops or rub  Respiratory:  normal respiratory rate and rhythm, lungs clear to auscultation. No wheezes or crackles.  Extremities: No C/C/E in BLE.  Psychiatric: mentation appears normal and affect normal/bright    Assessment and Plan     Jenise was seen today for diabetes.    Diagnoses and all orders for this visit:    Type 2 diabetes mellitus without complication, without long-term current use of insulin (H): Still uncontrolled but control is improving with increase of insulin.  We will increase insulin further to 20 units at bedtime.  Also started bydurian with first dose given in clinic today by her daughter-in-law with instruction from Pharm.D.  She will continue this weekly.  Stop Januvia.  -     insulin glargine (BASAGLAR KWIKPEN) 100 UNIT/ML pen; Inject 20 Units Subcutaneous daily        Patient Instructions   Stop sitagliptin (Januvia).  Continue metformin.    Start Bydureon  pen weekly.  First dose done here. Give every Monday. Time of day does not matter.    Increase basaglar insulin (nightly injection) to 20 units.    Bring your meter to your next visit. Continue to check at least twice a day.    If you have symptoms of low blood sugar or feeling sick, check more often.             Follow-up as already scheduled on December 30.     Options for treatment and/or follow-up care were reviewed with the patient. Jenise Paz was engaged and actively involved in the decision making process. She verbalized understanding of the options discussed and was satisfied with the final plan.    Afia Cortez MD

## 2019-12-09 NOTE — NURSING NOTE
Due to patient being non-English speaking/uses sign language, an  was used for this visit. Only for face-to-face interpretation by an external agency, date and length of interpretation can be found on the scanned worksheet.     name: Mike Philip  Agency: [employer]  Language: Jennifer   Telephone number: [phone]  Type of interpretation: Face-to-face, spoken

## 2019-12-30 ENCOUNTER — OFFICE VISIT (OUTPATIENT)
Dept: FAMILY MEDICINE | Facility: CLINIC | Age: 74
End: 2019-12-30
Payer: MEDICARE

## 2019-12-30 VITALS
RESPIRATION RATE: 16 BRPM | DIASTOLIC BLOOD PRESSURE: 71 MMHG | OXYGEN SATURATION: 97 % | TEMPERATURE: 98.9 F | HEART RATE: 79 BPM | WEIGHT: 173.2 LBS | BODY MASS INDEX: 34.98 KG/M2 | SYSTOLIC BLOOD PRESSURE: 116 MMHG

## 2019-12-30 DIAGNOSIS — E11.9 TYPE 2 DIABETES MELLITUS WITHOUT COMPLICATION, WITHOUT LONG-TERM CURRENT USE OF INSULIN (H): Primary | ICD-10-CM

## 2019-12-30 LAB
CREAT UR-MCNC: 243.8 MG/DL
MICROALBUMIN UR-MCNC: 3.27 MG/DL (ref 0–1.99)
MICROALBUMIN/CREAT UR: 13.4 MG/G

## 2019-12-30 RX ORDER — INSULIN GLARGINE 100 [IU]/ML
24 INJECTION, SOLUTION SUBCUTANEOUS DAILY
Qty: 12 ML | Refills: 3 | Status: SHIPPED | OUTPATIENT
Start: 2019-12-30 | End: 2020-02-03

## 2019-12-30 NOTE — PROGRESS NOTES
Nursing Notes:   Jessica Garcia, [facility]  12/30/2019 11:15 AM  Signed   name: Nirali Elmore-  Language: [employer]  Agency: [employer]  Phone number: [phone]  Chief Complaint   Patient presents with     RECHECK     Followup     Blood pressure 116/71, pulse 79, temperature 98.9  F (37.2  C), resp. rate 16, weight 78.6 kg (173 lb 3.2 oz), SpO2 97 %.                 SUZAN Paz is a 73 year old  female with a PMH significant for:     Patient Active Problem List   Diagnosis     Abnormal Pap smear of cervix     Cervical spondylosis     PTSD (post-traumatic stress disorder)     Hyperlipemia     Hypertension     Low back pain Chronic     DDD (degenerative disc disease), lumbar     Muscle Aches, Generalized (Myalgia)     Thyroid nodule     History of subtotal thyroidectomy     Vitamin D deficiency     Seasonal allergies     Noncompliance with medication regimen     Type 2 diabetes mellitus without complication, without long-term current use of insulin (H)     Coronary artery disease involving native coronary artery of native heart with unstable angina pectoris (H)     Anemia, iron deficiency     S/P CABG (coronary artery bypass graft)     She presents with follow-up of diabetes.  Still having high blood sugars into the 500s at times.  She is continued the same diet.  She is eating couscous instead of rice.  She also eats a lot of fruit.  She likes cucumbers for vegetables.  After review of her meter she has not taken very many mornings fasting sugars for me to interpret.  Average of her p.m. sugars are in the 300s.  Lowest sugar in the last couple weeks is 153.  She has no symptoms of hypoglycemia.  She has been doing 16 units of glargine instead of the 20 we had discussed.  She is doing the Bydureon weekly on Mondays.  This is going well.  No CP, SOB, or foot problems.   PMH, Medications and Allergies were reviewed and updated as needed.     A [employer]  was used for this visit           Physical Exam:      Vitals:    12/30/19 1113   BP: 116/71   Pulse: 79   Resp: 16   Temp: 98.9  F (37.2  C)   SpO2: 97%   Weight: 78.6 kg (173 lb 3.2 oz)     Body mass index is 34.98 kg/m .    Exam:  Constitutional: healthy, alert and no distress  Cardiovascular:RRR. No murmurs, clicks gallops or rub  Respiratory:  normal respiratory rate and rhythm, lungs clear to auscultation. No wheezes or crackles.  Abdomen: +BS, soft, nontender, nondistended. No HSM.  Extremities: No C/C/E in BLE.   Psychiatric: mentation appears normal and affect normal/bright    No results found for any visits on 12/30/19.    Assessment and Plan     Jenise was seen today for recheck.    Diagnoses and all orders for this visit:    Type 2 diabetes mellitus without complication, without long-term current use of insulin (H): Still poorly controlled.  She did not increase insulin as we had planned at the last visit.  Discussed increasing glargine to 24 units at nighttime.  I suspect she will need even even higher dose.  I am hesitant to increase too much though as she is not checking her fasting sugars very often to make these adjustments.  Encouraged her to check this more often so we can adjust the insulin.  Also encouraged her to eat small portions of her couscous and spread her fruit out throughout the day to decrease the amount that the sugar goes up.  Continue bydurian.  -     Microalbumin [facility] ([facility])        Patient Instructions   Increase insulin to 24 units at night time.    Try to check sugar for frequently right when you wake up, before you eat.    Follow-up in 2 weeks.     Options for treatment and/or follow-up care were reviewed with the patient. Jenise Paz was engaged and actively involved in the decision making process. She verbalized understanding of the options discussed and was satisfied with the final plan.    Afia Cortez MD

## 2019-12-30 NOTE — PATIENT INSTRUCTIONS
Increase insulin to 24 units at night time.    Try to check sugar for frequently right when you wake up, before you eat.

## 2019-12-30 NOTE — NURSING NOTE
Due to patient being non-English speaking/uses sign language, an  was used for this visit. Only for face-to-face interpretation by an external agency, date and length of interpretation can be found on the scanned worksheet.     name: Nirali Malcolm  Agency: [employer]  Language: Jennifer   Telephone number: [phone]  Type of interpretation: Face-to-face, spoken

## 2019-12-31 NOTE — RESULT ENCOUNTER NOTE
You have a very small amount of protein in your urine from kidney damage from diabetes.  It is not concerning at this time but we will continue to check this every year and work on controlling your diabetes to prevent it from getting worse.

## 2020-01-11 ENCOUNTER — TELEPHONE (OUTPATIENT)
Dept: FAMILY MEDICINE | Facility: CLINIC | Age: 75
End: 2020-01-11

## 2020-01-12 NOTE — TELEPHONE ENCOUNTER
[facility] Answering Service    BG is in 400. She is feeling fatigued and sleeping the entire day. She has not been sick, but told her daughter she feels that her energy is leaving her body. She thinks that she needs some IVF to feel better.     Discussed that this could be signs of DKA or HHN and the only way to tell is to do more blood tests at the ED. Recommended that she be evaluated in the ED. Patient wanted to think about going into the ED and does not want to go in now. Again, recommended going to the ED for further assessment as this could be life threatening.      Soledad Dickinson MD PGY2

## 2020-01-14 ENCOUNTER — TRANSFERRED RECORDS (OUTPATIENT)
Dept: HEALTH INFORMATION MANAGEMENT | Facility: CLINIC | Age: 75
End: 2020-01-14

## 2020-01-22 DIAGNOSIS — E11.9 TYPE 2 DIABETES MELLITUS WITHOUT COMPLICATION, WITHOUT LONG-TERM CURRENT USE OF INSULIN (H): Primary | ICD-10-CM

## 2020-01-22 RX ORDER — BLOOD-GLUCOSE METER
EACH MISCELLANEOUS
Qty: 1 KIT | Refills: 0 | Status: SHIPPED | OUTPATIENT
Start: 2020-01-22 | End: 2023-04-03

## 2020-01-23 DIAGNOSIS — M54.42 CHRONIC LEFT-SIDED LOW BACK PAIN WITH LEFT-SIDED SCIATICA: ICD-10-CM

## 2020-01-23 DIAGNOSIS — G89.29 CHRONIC LEFT-SIDED LOW BACK PAIN WITH LEFT-SIDED SCIATICA: ICD-10-CM

## 2020-01-23 DIAGNOSIS — J30.89 SEASONAL ALLERGIC RHINITIS DUE TO OTHER ALLERGIC TRIGGER: ICD-10-CM

## 2020-01-23 RX ORDER — PSEUDOEPHED/ACETAMINOPH/DIPHEN 30MG-500MG
TABLET ORAL
Qty: 200 TABLET | Refills: 12 | Status: SHIPPED | OUTPATIENT
Start: 2020-01-23 | End: 2024-08-02

## 2020-01-23 RX ORDER — AZELASTINE HYDROCHLORIDE 0.5 MG/ML
SOLUTION/ DROPS OPHTHALMIC
Qty: 18 ML | Refills: 11 | Status: SHIPPED | OUTPATIENT
Start: 2020-01-23 | End: 2023-03-21

## 2020-02-03 ENCOUNTER — OFFICE VISIT (OUTPATIENT)
Dept: FAMILY MEDICINE | Facility: CLINIC | Age: 75
End: 2020-02-03
Payer: MEDICARE

## 2020-02-03 VITALS
TEMPERATURE: 98.2 F | SYSTOLIC BLOOD PRESSURE: 106 MMHG | WEIGHT: 178.2 LBS | DIASTOLIC BLOOD PRESSURE: 58 MMHG | BODY MASS INDEX: 35.99 KG/M2 | HEART RATE: 86 BPM | RESPIRATION RATE: 18 BRPM

## 2020-02-03 DIAGNOSIS — R23.2 HOT FLASHES: ICD-10-CM

## 2020-02-03 DIAGNOSIS — E11.9 TYPE 2 DIABETES MELLITUS WITHOUT COMPLICATION, WITHOUT LONG-TERM CURRENT USE OF INSULIN (H): ICD-10-CM

## 2020-02-03 DIAGNOSIS — I25.110 CORONARY ARTERY DISEASE INVOLVING NATIVE CORONARY ARTERY OF NATIVE HEART WITH UNSTABLE ANGINA PECTORIS (H): ICD-10-CM

## 2020-02-03 DIAGNOSIS — E11.59 TYPE 2 DIABETES MELLITUS WITH OTHER CIRCULATORY COMPLICATION, WITHOUT LONG-TERM CURRENT USE OF INSULIN (H): Primary | ICD-10-CM

## 2020-02-03 DIAGNOSIS — E11.9 TYPE 2 DIABETES MELLITUS WITHOUT COMPLICATION, WITH LONG-TERM CURRENT USE OF INSULIN (H): Primary | ICD-10-CM

## 2020-02-03 DIAGNOSIS — Z79.4 TYPE 2 DIABETES MELLITUS WITHOUT COMPLICATION, WITH LONG-TERM CURRENT USE OF INSULIN (H): Primary | ICD-10-CM

## 2020-02-03 DIAGNOSIS — R07.89 CHEST TIGHTNESS: ICD-10-CM

## 2020-02-03 LAB
BUN SERPL-MCNC: 11.2 MG/DL (ref 7–19)
CALCIUM SERPL-MCNC: 9.3 MG/DL (ref 8.5–10.1)
CHLORIDE SERPLBLD-SCNC: 102.8 MMOL/L (ref 98–110)
CO2 SERPL-SCNC: 29.9 MMOL/L (ref 20–32)
CREAT SERPL-MCNC: 0.6 MG/DL (ref 0.5–1)
GFR SERPL CREATININE-BSD FRML MDRD: >90 ML/MIN/1.7 M2
GLUCOSE SERPL-MCNC: 199.2 MG'DL (ref 70–99)
HBA1C MFR BLD: 10.2 % (ref 4.1–5.7)
POTASSIUM SERPL-SCNC: 3.7 MMOL/L (ref 3.2–4.6)
SODIUM SERPL-SCNC: 139.9 MMOL/L (ref 132–142)
TSH SERPL DL<=0.05 MIU/L-ACNC: 0.46 UIU/ML (ref 0.3–5)

## 2020-02-03 RX ORDER — INSULIN GLARGINE 100 [IU]/ML
26 INJECTION, SOLUTION SUBCUTANEOUS DAILY
Qty: 12 ML | Refills: 3 | Status: SHIPPED | OUTPATIENT
Start: 2020-02-03 | End: 2020-08-25

## 2020-02-03 RX ORDER — NITROGLYCERIN 0.4 MG/1
0.4 TABLET SUBLINGUAL EVERY 5 MIN PRN
Qty: 25 TABLET | Refills: 12 | Status: SHIPPED | OUTPATIENT
Start: 2020-02-03 | End: 2024-07-20

## 2020-02-03 NOTE — PROGRESS NOTES
Nursing Notes:   Jessica Garcia, [facility]  2/3/2020  1:50 PM  Signed   name: Chace Louis  Language: Algramo  Agency: [employer]  Phone number: [phone]  Chief Complaint   Patient presents with     Diabetes     Blood pressure 106/58, pulse 86, temperature 98.2  F (36.8  C), resp. rate 18, weight 80.8 kg (178 lb 3.2 oz).                 SUZAN Paz is a 73 year old  female with a PMH significant for:     Patient Active Problem List   Diagnosis     Abnormal Pap smear of cervix     Cervical spondylosis     PTSD (post-traumatic stress disorder)     Hyperlipemia     Hypertension     Low back pain Chronic     DDD (degenerative disc disease), lumbar     Muscle Aches, Generalized (Myalgia)     Thyroid nodule     History of subtotal thyroidectomy     Vitamin D deficiency     Seasonal allergies     Noncompliance with medication regimen     Type 2 diabetes mellitus without complication, without long-term current use of insulin (H)     Coronary artery disease involving native coronary artery of native heart with unstable angina pectoris (H)     Anemia, iron deficiency     S/P CABG (coronary artery bypass graft)     She presents with follow up of diabetes. No low blood sugars.   14 day avg 165 n=20.  Most of her morning sugars are within range and some are as low as 70s and 80s.  There is only 1 AM sugar that was above 200.  She cannot tell me whether she had forgotten her insulin before that 1.  P.m. blood sugars are more variable.  In the 150s to lower 200s.  Overall blood sugars are much improved from before.  She has no hypoglycemia symptoms.      A little chest pain off and on.  It happens about every other day.  She can be at rest or moving.  She has history of bypass surgery at [place].  She has not seen her cardiologist for 2 years.  She does not currently have chest pain.  No SOB.  Feels a little hot when this happens.  She has not tried her nitroglycerin when her chest pain comes.      PMH, Medications and Allergies  were reviewed and updated as needed.     A Varthana  was used for this visit           Physical Exam:     Vitals:    02/03/20 1347   BP: 106/58   Pulse: 86   Resp: 18   Temp: 98.2  F (36.8  C)   Weight: 80.8 kg (178 lb 3.2 oz)     Body mass index is 35.99 kg/m .  Wt Readings from Last 4 Encounters:   02/03/20 80.8 kg (178 lb 3.2 oz)   12/30/19 78.6 kg (173 lb 3.2 oz)   12/09/19 78 kg (172 lb)   12/02/19 79 kg (174 lb 3.2 oz)        Exam:  Constitutional: healthy, alert and no distress  Neck: Neck supple. No adenopathy. Thyroid symmetric, normal size,  Cardiovascular:RRR. No murmurs, clicks gallops or rub  Respiratory:  normal respiratory rate and rhythm, lungs clear to auscultation. No wheezes or crackles.  Extremities: No C/C/E in BLE. 2+DP pulses.   Foot exam: Skin is intact no ulcers or calluses.  Normal sensation to light touch.  2+ DP PT pulses.    Skin: no rashes.  Psychiatric: mentation appears normal and affect normal/bright      EKG Interpretation  Indication:Chest pain    Interpretation: Normal Sinus Rhythm, rate 83, normal axis, normal intervals, no acute ST/T changes c/w ischemia, no acute ST-T wave changes suggestive of ischemia, Q waves in inferior leads. Unchanged from previous at [place] 4/24/2018.    Patient informed at visit.    No flowsheet data found.  PHQ-9 SCORE 5/15/2014   PHQ-9 Total Score 7     Results for orders placed or performed in visit on 02/03/20   Hemoglobin A1c ([facility])     Status: Abnormal   Result Value Ref Range    Hemoglobin A1C 10.2 (H) 4.1 - 5.7 %   Basic Metabolic Panel ([facility])     Status: Abnormal   Result Value Ref Range    Urea Nitrogen 11.2 7.0 - 19.0 mg/dL    Calcium 9.3 8.5 - 10.1 mg/dL    Chloride 102.8 98.0 - 110.0 mmol/L    Carbon Dioxide 29.9 20.0 - 32.0 mmol/L    Creatinine 0.6 0.5 - 1.0 mg/dL    Glucose 199.2 (H) 70.0 - 99.0 mg'dL    Potassium 3.7 3.2 - 4.6 mmol/L    Sodium 139.9 132.0 - 142.0 mmol/L    GFR Estimate >90 >60.0 mL/min/1.7 m2    GFR Estimate If  Black >90 >60.0 mL/min/1.7 m2       Assessment and Plan     Jenise was seen today for diabetes.    Diagnoses and all orders for this visit:    Type 2 diabetes mellitus without complication, with long-term current use of insulin (H): Diabetes control is improving with increased insulin and Bydurian.  Discussed case with pharmacist after she left as they were unavailable during her visit and we decided to try starting her on Invokana.  The only other option for her would be to start mealtime insulin, but she does not check her blood sugars often enough nor does she give herself her own injections during the day.  I called the daughter to update her on this plan after the visit.  -     Hemoglobin A1c ([facility])  -     Basic Metabolic Panel ([facility])  -     EKG 12-lead complete w/read - Clinics  -      : Sign Language or Oral - 68-82 minutes  -     insulin glargine (BASAGLAR KWIKPEN) 100 UNIT/ML pen; Inject 26 Units Subcutaneous daily    Hot flashes: Unclear what is causing these they seem to happen with the chest pain.  I do think she needs to have work-up by cardiology in the near future.  We will also check her thyroid today.  -     Thyroid [place] ([facility])    Chest tightness: EKG is stable compared to last one today she is without chest pain at today's visit.  I think she needs to see her cardiologist as soon as possible at [place].  Family will make this appointment for her.  -     EKG 12-lead complete w/read - Clinics    Coronary artery disease involving native coronary artery of native heart with unstable angina pectoris (H): Discussed that she needs to try nitroglycerin for chest pain when she has it.  -     nitroGLYcerin (NITROSTAT) 0.4 MG sublingual tablet; Place 1 tablet (0.4 mg) under the tongue every 5 minutes as needed for chest pain        Patient Instructions   Try nitroglycerin for your heart.    Make appointment with heart doctor ASAP.     Return in about 2 months (around 4/3/2020) for Diabetes  mellitus.     Options for treatment and/or follow-up care were reviewed with the patient. Jenise Paz was engaged and actively involved in the decision making process. She verbalized understanding of the options discussed and was satisfied with the final plan.    Afia Cortez MD

## 2020-02-03 NOTE — NURSING NOTE
Due to patient being non-English speaking/uses sign language, an  was used for this visit. Only for face-to-face interpretation by an external agency, date and length of interpretation can be found on the scanned worksheet.     name: Chace Louis  Agency: [employer]  Language: Jennifer   Telephone number: [phone]  Type of interpretation: Face-to-face, spoken

## 2020-02-04 NOTE — RESULT ENCOUNTER NOTE
Your thyroid test was normal.  This is reassuring. Follow up with your cardiologist ASAP for your heart.

## 2020-02-16 ENCOUNTER — HEALTH MAINTENANCE LETTER (OUTPATIENT)
Age: 75
End: 2020-02-16

## 2020-03-11 DIAGNOSIS — I25.119 CORONARY ARTERY DISEASE INVOLVING NATIVE HEART WITH ANGINA PECTORIS, UNSPECIFIED VESSEL OR LESION TYPE (H): ICD-10-CM

## 2020-03-11 DIAGNOSIS — E78.00 HYPERCHOLESTEREMIA: ICD-10-CM

## 2020-03-12 RX ORDER — METOPROLOL SUCCINATE 25 MG/1
TABLET, EXTENDED RELEASE ORAL
Qty: 90 TABLET | Refills: 4 | Status: SHIPPED | OUTPATIENT
Start: 2020-03-12 | End: 2021-03-16

## 2020-03-12 RX ORDER — ISOSORBIDE MONONITRATE 30 MG/1
TABLET, EXTENDED RELEASE ORAL
Qty: 90 TABLET | Refills: 4 | Status: SHIPPED | OUTPATIENT
Start: 2020-03-12 | End: 2021-03-16

## 2020-03-12 RX ORDER — ATORVASTATIN CALCIUM 80 MG/1
TABLET, FILM COATED ORAL
Qty: 90 TABLET | Refills: 3 | Status: SHIPPED | OUTPATIENT
Start: 2020-03-12 | End: 2020-12-29

## 2020-03-26 ENCOUNTER — NURSE TRIAGE (OUTPATIENT)
Dept: NURSING | Facility: CLINIC | Age: 75
End: 2020-03-26

## 2020-04-09 DIAGNOSIS — G89.29 CHRONIC LEFT-SIDED LOW BACK PAIN WITH LEFT-SIDED SCIATICA: ICD-10-CM

## 2020-04-09 DIAGNOSIS — M54.42 CHRONIC LEFT-SIDED LOW BACK PAIN WITH LEFT-SIDED SCIATICA: ICD-10-CM

## 2020-04-10 RX ORDER — GABAPENTIN 300 MG/1
CAPSULE ORAL
Qty: 270 CAPSULE | Refills: 3 | Status: SHIPPED | OUTPATIENT
Start: 2020-04-10 | End: 2021-08-30

## 2020-04-20 DIAGNOSIS — E11.9 TYPE 2 DIABETES MELLITUS WITHOUT COMPLICATION, WITHOUT LONG-TERM CURRENT USE OF INSULIN (H): ICD-10-CM

## 2020-04-20 RX ORDER — GLIPIZIDE 10 MG/1
TABLET, FILM COATED, EXTENDED RELEASE ORAL
Qty: 180 TABLET | Refills: 4 | OUTPATIENT
Start: 2020-04-20

## 2020-04-21 ENCOUNTER — ALLIED HEALTH/NURSE VISIT (OUTPATIENT)
Dept: PHARMACY | Facility: PHYSICIAN GROUP | Age: 75
End: 2020-04-21
Payer: COMMERCIAL

## 2020-04-21 DIAGNOSIS — E11.9 TYPE 2 DIABETES MELLITUS WITHOUT COMPLICATION, WITHOUT LONG-TERM CURRENT USE OF INSULIN (H): Primary | ICD-10-CM

## 2020-04-21 PROCEDURE — 99207 ZZC NO CHARGE LOS: CPT | Performed by: PHARMACIST

## 2020-04-21 RX ORDER — GLIPIZIDE 10 MG/1
20 TABLET, FILM COATED, EXTENDED RELEASE ORAL DAILY
Qty: 60 TABLET | Refills: 0 | COMMUNITY
Start: 2020-04-21 | End: 2020-05-01

## 2020-04-21 NOTE — PROGRESS NOTES
Clinical Pharmacy Consult:                                                    Jenise Paz is a 73 year old female called for a clinical pharmacist consult.  Patient consent to continue virtual visit via phone.    Reason for Consult: I was consulted for a refill requested by patient (lancets). I sent in the refill request and after review of patients dispense report, I notice a few discrepancies between the dispense report and patients current medication list. Some of the medications refilled and picked up in the dispense report was not on record for patient current medication list. Per dispense report patient is taking Bydureon, last refilled a 90 day supply on 12/05/19. Additionally patient has also been refilling a 90 day supply of Januvia (12/30/19 and 3/29/20) per dispense report. Use of DPP-4 should be avoided in combination with a GLP1.  Therefore I contacted patient directly for medication reconciliation.    Discussion:   Spoke to patient's daughter with patient's consent. Saida (daughter) discussed patient does not know her medications and daughter manages her medications. Saida reported she is in a hurry and only has 10 minutes. Reconciled patient's medications with Saida. 1 discrepancies was found. Patient is still taking Glipizide ER 10 mg-2 tablets daily which was not on patients medication list and denies lows. Daughter reports that Januvia was on auto-refill and she picked this up by mistake however recognized that patient should not be on this medication and has not beeing giving her Januvia. Discussed that since the patient is on Bydureon, she should not be taking Januvia at the same time.     Plan:  1. Added Glipizide ER 10mg-2 tablets daily to patients medication list  2 Called pharmacy and deactivate Januvia prescription.     Odalis Bill  Pharmacy Resident

## 2020-04-22 NOTE — PROGRESS NOTES
I have verified the content of the note, which accurately reflects my assessment of the patient and the plan of care.   Beth De Leon, [facility], PharmD

## 2020-04-24 ENCOUNTER — TELEPHONE (OUTPATIENT)
Dept: FAMILY MEDICINE | Facility: CLINIC | Age: 75
End: 2020-04-24

## 2020-04-24 NOTE — TELEPHONE ENCOUNTER
Reached out to patient during COVID19 Clinic outreach. Reassured patient that [facility] is still open and has started implementing phone and video appointments to help patient remain safe at home.     Patient reports the following concerns: None    Per patient request, patient is scheduled for a visit to address their concerns on the following date: Talked to daughter to let her know that we are still open but doing phone visits and video visits. She will let her mom know and call us back next week to schedule a phone visit.     Offered MyChart. Patient is active    Note will be routed to PCP to assist in addressing patient concerns and/or to schedule a visit.     Darci Zepeda, CMA

## 2020-04-29 DIAGNOSIS — E11.9 TYPE 2 DIABETES MELLITUS WITHOUT COMPLICATION, WITHOUT LONG-TERM CURRENT USE OF INSULIN (H): ICD-10-CM

## 2020-04-29 RX ORDER — GLIPIZIDE 10 MG/1
TABLET, FILM COATED, EXTENDED RELEASE ORAL
Qty: 180 TABLET | Status: CANCELLED | OUTPATIENT
Start: 2020-04-29

## 2020-05-01 RX ORDER — GLIPIZIDE 10 MG/1
TABLET, FILM COATED, EXTENDED RELEASE ORAL
Qty: 180 TABLET | Refills: 3 | Status: SHIPPED | OUTPATIENT
Start: 2020-05-01 | End: 2020-06-22

## 2020-05-01 RX ORDER — PEN NEEDLE, DIABETIC 32GX 5/32"
NEEDLE, DISPOSABLE MISCELLANEOUS
Qty: 90 EACH | Refills: 4 | Status: SHIPPED | OUTPATIENT
Start: 2020-05-01 | End: 2020-12-10

## 2020-05-15 ENCOUNTER — DOCUMENTATION ONLY (OUTPATIENT)
Dept: FAMILY MEDICINE | Facility: CLINIC | Age: 75
End: 2020-05-15

## 2020-05-15 NOTE — TELEPHONE ENCOUNTER
[facility] phone call message- patient requesting a refill:    Pad that go on the bed, undergarments for incontinence and gloves    Pharmacy confirmed as   [facility]   : Yes    Additional Comments: Saida received a phone call a couple of days ago from someone here that said the above stuff had been ordered by Dr Cortez.  I am not seeing anything in her chart.  Please call.     OK to leave a message on voice mail? Yes    Primary language: ong      needed? Yes    Call taken on May 15, 2020 at 3:20 PM by Ana Paula Bustos

## 2020-05-15 NOTE — PROGRESS NOTES
To be completed in Nursing note:    Please reference list for forms that require a visit for completion.  Please remind patients that providers are given 3-5 business days to complete and return forms.      Form type: [employer] Medical forms    Date form received: 5/1/20    Date form completed by Physician: 5/8/20  Not completed, needs to have phone visit    How was form returned to patient (mailed, faxed, or at  for patient to ):    Date form mailed/faxed/left at  for patient and sent to HIM for scanning:      Once form is left for patient, faxed, or mailed PCS will then close the documentation only encounter.

## 2020-05-15 NOTE — TELEPHONE ENCOUNTER
We had received orders from [employer]. My response was that I did not know what the supplies were medically indicated for.  I had requested that the family schedule a visit to discuss the nature of this need. Medicare usually needs a visit to verify medical need at the times of prescription to cover.    If they call back please have them schedule a visit.  Afia Cortez MD

## 2020-05-15 NOTE — TELEPHONE ENCOUNTER
I tried calling but was unable to get ahold of daughter.  I held onto the prescription forms.  Left a message just now on daughter phone to call and make a telephone or video visit appointment with either  or she could ask for me.    Jessica Garcia, CMA

## 2020-05-21 ENCOUNTER — VIRTUAL VISIT (OUTPATIENT)
Dept: FAMILY MEDICINE | Facility: CLINIC | Age: 75
End: 2020-05-21
Payer: COMMERCIAL

## 2020-05-21 DIAGNOSIS — E11.59 TYPE 2 DIABETES MELLITUS WITH OTHER CIRCULATORY COMPLICATION, WITHOUT LONG-TERM CURRENT USE OF INSULIN (H): Primary | ICD-10-CM

## 2020-05-21 DIAGNOSIS — J30.89 SEASONAL ALLERGIC RHINITIS DUE TO OTHER ALLERGIC TRIGGER: ICD-10-CM

## 2020-05-21 RX ORDER — CETIRIZINE HYDROCHLORIDE 10 MG/1
10 TABLET ORAL DAILY
Qty: 90 TABLET | Refills: 4 | Status: SHIPPED | OUTPATIENT
Start: 2020-05-21 | End: 2023-04-03

## 2020-05-21 NOTE — PROGRESS NOTES
"Family Medicine Telephone Visit Note                   Telephone Visit Consent   Patient was verbally read the following and verbal consent was obtained.     \"Telephone visits are billed at different rates depending on your insurance coverage. During this emergency period, for some insurers they may be billed the same as an in-person visit.  Please reach out to your insurance provider with any questions.  If during the course of the call the physician/provider feels a telephone visit is not appropriate, you will not be charged for this service.\"     Name person giving consent:  Patient   Date verbal consent given:  5/21/2020  Time verbal consent given:  9:18 AM                  Chief Complaint   Patient presents with     Recheck Medication                 Due to patient being non-English speaking/uses sign language, an  was used for this visit. Only for face-to-face interpretation by an external agency, date and length of interpretation can be found on the scanned worksheet.   name: Stephany Luis  Language: Jennifer  Agency: [employer]  Phone number: [phone]                    [facility]   Patients name: Jenise  Appointment start time:  9:19 AM    Diabetes and medication check: She really does not know her medications as her daughter-in-law sets them up for her.  She does state that she takes it every day.  Unfortunately during our visit we are unable to complete it due to a voicemail being on the conference call and when we have not been called her back she did not answer.    Allergies: watery eyes, itching. Requesting allergy medication, oral.  She has had seasonal allergies in the past and it is worsened recently.                 Review of Systems:     Not able to complete due to being disconnected.         Physical Exam:     There were no vitals taken for this visit.  Estimated body mass index is 35.99 kg/m  as calculated from the following:    Height as of 12/2/19: 1.499 m (4' 11\").    Weight as of 2/3/20: " 80.8 kg (178 lb 3.2 oz).    Exam:  Constitutional: healthy, alert and no distress  Psychiatric: mentation appears normal and affect normal/bright            Assessment and Plan   Jenise was seen today for recheck medication.    Diagnoses and all orders for this visit:    Type 2 diabetes mellitus with other circulatory complication, without long-term current use of insulin (H): Not able to completely get history about her diabetes or confirm diabetes plan.  I will place future lab orders for her because she is due for these.  We will reach out to her to help her schedule a follow-up visit after and lab only visit.  We will encourage her to have her daughter-in-law on the call as well since she does her medication management.    Seasonal allergic rhinitis due to other allergic trigger: requesting a refill.  She does have flonase and eye drops as well that I reminded her of.  -     cetirizine (ZYRTEC) 10 MG tablet; Take 1 tablet (10 mg) by mouth daily      I personally tried to reconnect 3 times with the patient during the visit. My PCS then tried again multiple times.  Care coordinator given message to help patient reschedule and have family on the call with her as well.    After Visit Information:  Will print and mail AVS     No follow-ups on file.    Appointment end time: 9:26 AM  This is a telephone visit that took 7 minutes.  Was disconnected the visit was not complete.      Clinician location: [facility]    Afia Cortez MD

## 2020-05-28 DIAGNOSIS — E11.9 TYPE 2 DIABETES MELLITUS WITHOUT COMPLICATION, WITHOUT LONG-TERM CURRENT USE OF INSULIN (H): ICD-10-CM

## 2020-05-28 DIAGNOSIS — J30.2 CHRONIC SEASONAL ALLERGIC RHINITIS: ICD-10-CM

## 2020-05-29 RX ORDER — LORATADINE 10 MG/1
TABLET ORAL
Qty: 90 TABLET | Refills: 4 | Status: SHIPPED | OUTPATIENT
Start: 2020-05-29 | End: 2020-06-22

## 2020-05-29 RX ORDER — METFORMIN HCL 500 MG
TABLET, EXTENDED RELEASE 24 HR ORAL
Qty: 360 TABLET | Refills: 4 | Status: SHIPPED | OUTPATIENT
Start: 2020-05-29 | End: 2021-06-14

## 2020-06-17 ENCOUNTER — TELEPHONE (OUTPATIENT)
Dept: FAMILY MEDICINE | Facility: CLINIC | Age: 75
End: 2020-06-17

## 2020-06-17 NOTE — TELEPHONE ENCOUNTER
[facility] Family [facility] phone call message- general phone call:    Reason for call: Erik is calling because they sent over a 2nd request for adult incontinence products (pull ups, and gloves). If can please complete and fax back to [phone].    Return call needed: Yes    OK to leave a message on voice mail? Yes    Primary language: [facility]      needed? Yes    Call taken on June 17, 2020 at 9:26 AM by Grisel Flores-Cardona

## 2020-06-19 ENCOUNTER — TELEPHONE (OUTPATIENT)
Dept: FAMILY MEDICINE | Facility: CLINIC | Age: 75
End: 2020-06-19

## 2020-06-19 NOTE — TELEPHONE ENCOUNTER
----- Message from Jessica Garcia CMA sent at 5/21/2020 11:06 AM CDT -----  Regarding: followup  Hi,    Patient got disconnected on a call.  It was hard to get ahold of patient.  Can you please followup and see if we can get a visit with patient and also a family member.  Confirm number to call.    Thanks,    Jessica

## 2020-06-19 NOTE — TELEPHONE ENCOUNTER
SW utilized [employer]  to reach out to patient and her daughter for scheduling.     Called #[phone]- was able to speak with Saida, patient's daughter in law. She was amenable to scheduling an appointment for Jenise. Lab is scheduled for Monday, June 22 at 11:10am and PCP visit with  same day at 4:10pm- phone visit.      will request a Tami . Saida will be present for the visit.     LORENZO Mariano

## 2020-06-22 ENCOUNTER — MEDICAL CORRESPONDENCE (OUTPATIENT)
Dept: HEALTH INFORMATION MANAGEMENT | Facility: CLINIC | Age: 75
End: 2020-06-22

## 2020-06-22 ENCOUNTER — VIRTUAL VISIT (OUTPATIENT)
Dept: FAMILY MEDICINE | Facility: CLINIC | Age: 75
End: 2020-06-22
Payer: COMMERCIAL

## 2020-06-22 VITALS — DIASTOLIC BLOOD PRESSURE: 80 MMHG | WEIGHT: 178 LBS | SYSTOLIC BLOOD PRESSURE: 126 MMHG | BODY MASS INDEX: 35.95 KG/M2

## 2020-06-22 DIAGNOSIS — E11.9 TYPE 2 DIABETES MELLITUS WITHOUT COMPLICATION, WITHOUT LONG-TERM CURRENT USE OF INSULIN (H): Primary | ICD-10-CM

## 2020-06-22 DIAGNOSIS — R39.81 FUNCTIONAL URINARY INCONTINENCE: ICD-10-CM

## 2020-06-22 DIAGNOSIS — I10 ESSENTIAL HYPERTENSION: ICD-10-CM

## 2020-06-22 DIAGNOSIS — E11.9 TYPE 2 DIABETES MELLITUS WITHOUT COMPLICATION, WITHOUT LONG-TERM CURRENT USE OF INSULIN (H): ICD-10-CM

## 2020-06-22 DIAGNOSIS — E11.9 TYPE 2 DIABETES MELLITUS WITHOUT COMPLICATION, WITH LONG-TERM CURRENT USE OF INSULIN (H): Primary | ICD-10-CM

## 2020-06-22 DIAGNOSIS — Z79.4 TYPE 2 DIABETES MELLITUS WITHOUT COMPLICATION, WITH LONG-TERM CURRENT USE OF INSULIN (H): Primary | ICD-10-CM

## 2020-06-22 LAB
BUN SERPL-MCNC: 19.3 MG/DL (ref 7–19)
CALCIUM SERPL-MCNC: 8.4 MG/DL (ref 8.5–10.1)
CHLORIDE SERPLBLD-SCNC: 106.9 MMOL/L (ref 98–110)
CHOLEST SERPL-MCNC: 115.9 MG/DL (ref 0–200)
CHOLEST/HDLC SERPL: 2.7 {RATIO} (ref 0–5)
CO2 SERPL-SCNC: 28.6 MMOL/L (ref 20–32)
CREAT SERPL-MCNC: 0.7 MG/DL (ref 0.5–1)
GFR SERPL CREATININE-BSD FRML MDRD: 84.5 ML/MIN/1.7 M2
GLUCOSE SERPL-MCNC: 101.4 MG'DL (ref 70–99)
HBA1C MFR BLD: 9.7 % (ref 4.1–5.7)
HDLC SERPL-MCNC: 43.4 MG/DL
LDLC SERPL CALC-MCNC: 50 MG/DL (ref 0–129)
POTASSIUM SERPL-SCNC: 4 MMOL/L (ref 3.2–4.6)
SODIUM SERPL-SCNC: 140.1 MMOL/L (ref 132–142)
TRIGL SERPL-MCNC: 111.6 MG/DL (ref 0–150)
VLDL CHOLESTEROL: 22.3 MG/DL (ref 7–32)

## 2020-06-22 NOTE — PATIENT INSTRUCTIONS
Stop glipizide due to low blood sugars.     Check sugars daily in the morning for one week and then call me with the results. This will help us to adjust the insulin better.

## 2020-06-22 NOTE — PROGRESS NOTES
Patient arrived for lab only visit in anticipation of her afternoon visit with Dr. Cortez.  Patient is type 2 diabetic, so labs placed to have them drawn now prior to the visit.     Malini Burt MD     never used

## 2020-06-22 NOTE — PROGRESS NOTES
"Family Medicine Telephone Visit Note               Telephone Visit Consent   Patient was verbally read the following and verbal consent was obtained.    \"Telephone visits are billed at different rates depending on your insurance coverage. During this emergency period, for some insurers they may be billed the same as an in-person visit.  Please reach out to your insurance provider with any questions.  If during the course of the call the physician/provider feels a telephone visit is not appropriate, you will not be charged for this service.\"    Name person giving consent:  Patient   Date verbal consent given:  6/22/2020  Time verbal consent given:  4:30 PM           Chief Complaint   Patient presents with     Diabetes              Due to patient being non-English speaking/uses sign language, an  was used for this visit. Only for face-to-face interpretation by an external agency, date and length of interpretation can be found on the scanned worksheet.     name: Bricechen Malcolm   Agency: [employer]  Language: [employer]   Telephone number: [phone]  Type of interpretation: Telemedicine, spoken          HPI   Patients name: Jenise  Appointment start time:  5:00 PM    Diabetes: had labs today for diabetes monitoring. A1c improved but continues to be poorly controlled with A1c 9.7%.  130-140 in the PM.  Daughter in law Saida checks in the evening when she does her shot.  Jenise checks in the AM. She didn't check the last two days. She states it is usually 100 something or 200.     Every once in awhile it is under 100.  Gets a little shakey and sweating and blurring vision when sugar is low.  She has not noticed a pattern to these symptoms. Not usually in the morning. Only happens in the afternoon or evening.  This happens once or twice a week.       Forms:  Received requests for gloves, chucks and briefs. Daughter-in-law gives her all of her injections of insulin nightly and bydureon once a week.  She requests gloves " "for giving the injections.             Review of Systems:     Constitutional, HEENT, cardiovascular, pulmonary, gi and gu systems are negative, except as otherwise noted.         Physical Exam:     /80   Wt 80.7 kg (178 lb)   BMI 35.95 kg/m    Estimated body mass index is 35.95 kg/m  as calculated from the following:    Height as of 12/2/19: 1.499 m (4' 11\").    Weight as of this encounter: 80.7 kg (178 lb).    Exam:  Constitutional: alert and no distress  Psychiatric: mentation appears normal and affect normal/bright            Assessment and Plan   Jenise was seen today for diabetes.    Diagnoses and all orders for this visit:    Type 2 diabetes mellitus without complication, with long-term current use of insulin (H): Stopping glipizide due to increased risk of hypoglycemia with insulin.  See plan below. Is on all other oral options.  Daughter-in-law requests gloves to give her injections. Order completed.    Essential hypertension; well controlled. Continue current meds.    Functional urinary incontinence:  Requesting pads and briefs to wear at night. Has trouble getting out of bed and to the bathroom on time at night time. Order completed.      Patient Instructions   Stop glipizide due to low blood sugars.     Check sugars daily in the morning for one week and then call me with the results. This will help us to adjust the insulin better.          After Visit Information:  Will print and mail AVS     Return in about 1 week (around 6/29/2020) for Phone call follow up with daughter-in-law only.  Then likely one month visit after that..    Appointment end time: 5:24 PM  This is a telephone visit that took 24 minutes.      Clinician location:  [facility]    Afia Cortez MD    "

## 2020-06-22 NOTE — RESULT ENCOUNTER NOTE
Your diabetes control is improved but still not well controlled.  Your cholesterol, kidney function, and electrolytes were all normal.

## 2020-06-30 DIAGNOSIS — Z79.4 TYPE 2 DIABETES MELLITUS WITHOUT COMPLICATION, WITH LONG-TERM CURRENT USE OF INSULIN (H): ICD-10-CM

## 2020-06-30 DIAGNOSIS — E11.9 TYPE 2 DIABETES MELLITUS WITHOUT COMPLICATION, WITH LONG-TERM CURRENT USE OF INSULIN (H): ICD-10-CM

## 2020-06-30 RX ORDER — CANAGLIFLOZIN 100 MG/1
TABLET, FILM COATED ORAL
Qty: 90 TABLET | Refills: 4 | Status: SHIPPED | OUTPATIENT
Start: 2020-06-30 | End: 2021-08-30 | Stop reason: DRUGHIGH

## 2020-07-26 NOTE — PATIENT INSTRUCTIONS
Restart lisinopril 1 pill once daily  Restart metoprolol 1 pill once daily  Restart aspirin 1 pill once daily  Increase Glipizide to new strength (10mg)- 2 pills once daily  Start Januvia 100mg once daily  Fill new nitroglycerin to put under tongue when you have chest pain.    Stop the Metformin for now.         Patient unable to complete

## 2020-08-24 DIAGNOSIS — Z79.4 TYPE 2 DIABETES MELLITUS WITHOUT COMPLICATION, WITH LONG-TERM CURRENT USE OF INSULIN (H): Primary | ICD-10-CM

## 2020-08-24 DIAGNOSIS — E11.9 TYPE 2 DIABETES MELLITUS WITHOUT COMPLICATION, WITH LONG-TERM CURRENT USE OF INSULIN (H): Primary | ICD-10-CM

## 2020-08-25 RX ORDER — INSULIN GLARGINE 100 [IU]/ML
INJECTION, SOLUTION SUBCUTANEOUS
Qty: 12 ML | Refills: 3 | Status: SHIPPED | OUTPATIENT
Start: 2020-08-25 | End: 2021-02-24

## 2020-11-22 ENCOUNTER — HEALTH MAINTENANCE LETTER (OUTPATIENT)
Age: 75
End: 2020-11-22

## 2020-12-09 DIAGNOSIS — E11.9 TYPE 2 DIABETES MELLITUS WITHOUT COMPLICATION, WITHOUT LONG-TERM CURRENT USE OF INSULIN (H): ICD-10-CM

## 2020-12-10 RX ORDER — PEN NEEDLE, DIABETIC 32GX 5/32"
NEEDLE, DISPOSABLE MISCELLANEOUS
Qty: 90 EACH | Refills: 4 | Status: SHIPPED | OUTPATIENT
Start: 2020-12-10 | End: 2022-07-24

## 2020-12-16 DIAGNOSIS — I25.110 CORONARY ARTERY DISEASE INVOLVING NATIVE CORONARY ARTERY OF NATIVE HEART WITH UNSTABLE ANGINA PECTORIS (H): ICD-10-CM

## 2020-12-18 RX ORDER — LISINOPRIL 2.5 MG/1
2.5 TABLET ORAL DAILY
Qty: 90 TABLET | Refills: 3 | Status: SHIPPED | OUTPATIENT
Start: 2020-12-18 | End: 2021-12-14

## 2020-12-29 DIAGNOSIS — E78.00 HYPERCHOLESTEREMIA: ICD-10-CM

## 2020-12-29 RX ORDER — ATORVASTATIN CALCIUM 80 MG/1
80 TABLET, FILM COATED ORAL DAILY
Qty: 90 TABLET | Refills: 0 | Status: SHIPPED | OUTPATIENT
Start: 2020-12-29 | End: 2021-03-19

## 2021-02-17 ENCOUNTER — TELEPHONE (OUTPATIENT)
Dept: FAMILY MEDICINE | Facility: CLINIC | Age: 76
End: 2021-02-17

## 2021-02-17 NOTE — TELEPHONE ENCOUNTER
Received communication from [employer]. Annual PCA and elderly waiver assessment revealed her concern about lack of appetite and not getting enough nutrition. Requesting nutritional supplement. Criss Bhagat Care Coordinator  [facility]. [phone]    On chart review, she was to have a follow up phone call in one week and then a follow visit in a month after her last visit in June.  She has not had a visit since then and has uncontrolled diabetes.  Nutritional supplement would have to account for these issues.   She needs a visit for diabetes in person if possible.     I will address the nutritional supplement as part of the visit.  Insurance requires clear medical documentation of need to cover this. In addition, work up for poor appetite may be needed.     Routed to PCS to call patient and Care Coordinator about need for a visit. Preferable in person due to need for labs.  Afia Cortez MD

## 2021-02-24 ENCOUNTER — TELEPHONE (OUTPATIENT)
Dept: FAMILY MEDICINE | Facility: CLINIC | Age: 76
End: 2021-02-24

## 2021-02-24 DIAGNOSIS — Z79.4 TYPE 2 DIABETES MELLITUS WITHOUT COMPLICATION, WITH LONG-TERM CURRENT USE OF INSULIN (H): ICD-10-CM

## 2021-02-24 DIAGNOSIS — E11.9 TYPE 2 DIABETES MELLITUS WITHOUT COMPLICATION, WITH LONG-TERM CURRENT USE OF INSULIN (H): ICD-10-CM

## 2021-02-24 NOTE — TELEPHONE ENCOUNTER
I called family back to verify patient's condition. Patient often minimizes issues and has uncontrolled diabetes and history of CAD s/p CABG so high risk of serious issues. TAlk to her son. She is feeling a little better now. Illness is only for the last couple days. MAy be fatigue from COVID vaccine per patient and son.     Discussed that she is overdue for check on her diabetes.  Discussed that even if she is feeling better I am worried about her chronic diseases and that she needs a visit with me soon.     Discussed that my next appointment is on Monday at 4:10PM.  They agreed to come for a visit then.  Afia Cortez MD

## 2021-02-24 NOTE — TELEPHONE ENCOUNTER
[facility] phone call message- general phone call:    Reason for call: She is not feeling well and wants to talk to Dr.Wicks goldberg if she should come in to the clinic or go to the hospital.    Action desired: call back    Return call needed: Yes    OK to leave a message on voice mail? Yes    Advised patient to response may take up to 2 business days: Yes    Primary language: Hmong      needed? Yes    Call taken on February 24, 2021 at 10:58 AM by Theresa Andrew

## 2021-02-24 NOTE — TELEPHONE ENCOUNTER
Patient states her daughter in law called saying she has no strength. Patient states she has had trouble eating and standing the last two days. She has no pain feels like her stomach can not process food. Patient apologies for the inconvenience and states she will return call if she is not better in 2-3 days  Patient encouraged to keep hydrated and to call if symptoms change or she changes her mind regarding being seen today patient encouraged to seek medical attention if her symptoms worsen. Patient voices understanding    #[id number]    Note routed to Dr.Wicks Mirna VELASQEUZ

## 2021-03-01 ENCOUNTER — OFFICE VISIT (OUTPATIENT)
Dept: FAMILY MEDICINE | Facility: CLINIC | Age: 76
End: 2021-03-01
Payer: COMMERCIAL

## 2021-03-01 VITALS
BODY MASS INDEX: 33.97 KG/M2 | WEIGHT: 168.2 LBS | OXYGEN SATURATION: 97 % | DIASTOLIC BLOOD PRESSURE: 63 MMHG | TEMPERATURE: 98.8 F | RESPIRATION RATE: 16 BRPM | HEART RATE: 83 BPM | SYSTOLIC BLOOD PRESSURE: 117 MMHG

## 2021-03-01 DIAGNOSIS — E55.9 VITAMIN D DEFICIENCY: ICD-10-CM

## 2021-03-01 DIAGNOSIS — Z79.4 TYPE 2 DIABETES MELLITUS WITHOUT COMPLICATION, WITH LONG-TERM CURRENT USE OF INSULIN (H): Primary | ICD-10-CM

## 2021-03-01 DIAGNOSIS — Z79.4 TYPE 2 DIABETES MELLITUS WITHOUT COMPLICATION, WITH LONG-TERM CURRENT USE OF INSULIN (H): ICD-10-CM

## 2021-03-01 DIAGNOSIS — R53.83 FATIGUE, UNSPECIFIED TYPE: ICD-10-CM

## 2021-03-01 DIAGNOSIS — E78.00 HYPERCHOLESTEREMIA: ICD-10-CM

## 2021-03-01 DIAGNOSIS — E11.9 TYPE 2 DIABETES MELLITUS WITHOUT COMPLICATION, WITH LONG-TERM CURRENT USE OF INSULIN (H): ICD-10-CM

## 2021-03-01 DIAGNOSIS — I25.119 CORONARY ARTERY DISEASE INVOLVING NATIVE HEART WITH ANGINA PECTORIS, UNSPECIFIED VESSEL OR LESION TYPE (H): ICD-10-CM

## 2021-03-01 DIAGNOSIS — E11.9 TYPE 2 DIABETES MELLITUS WITHOUT COMPLICATION, WITH LONG-TERM CURRENT USE OF INSULIN (H): Primary | ICD-10-CM

## 2021-03-01 LAB
ALBUMIN SERPL BCP-MCNC: 2.9 G/DL (ref 3.5–5)
ALP SERPL-CCNC: 161 U/L (ref 45–120)
ALT SERPL W/O P-5'-P-CCNC: 39 U/L (ref 0–45)
ANION GAP SERPL CALCULATED.3IONS-SCNC: 7 MMOL/L (ref 5–18)
AST SERPL-CCNC: 40 U/L (ref 0–40)
BILIRUB SERPL-MCNC: 0.3 MG/DL (ref 0–1)
BUN SERPL-MCNC: 7 MG/DL (ref 8–28)
CALCIUM SERPL-MCNC: 7.8 MG/DL (ref 8.5–10.5)
CHLORIDE SERPL-SCNC: 99 MMOL/L (ref 98–107)
CHOLEST SERPL-MCNC: 115 MG/DL
CO2 SERPL-SCNC: 25 MMOL/L (ref 22–31)
CREAT SERPL-MCNC: 0.95 MG/DL (ref 0.6–1.1)
CREAT UR-MCNC: 17.9 MG/DL
ERYTHROCYTE [DISTWIDTH] IN BLOOD BY AUTOMATED COUNT: 13.9 % (ref 11–14.5)
FASTING?: ABNORMAL
GLUCOSE SERPL-MCNC: 666 MG/DL (ref 70–125)
HCT VFR BLD AUTO: 38.3 % (ref 35–47)
HDLC SERPL-MCNC: 44 MG/DL
HGB BLD-MCNC: 11.3 G/DL (ref 12–16)
LDLC SERPL CALC-MCNC: 43 MG/DL
MCH RBC QN AUTO: 20.8 PG (ref 27–34)
MCHC RBC AUTO-ENTMCNC: 29.5 G/DL (ref 32–36)
MCV RBC AUTO: 71 FL (ref 80–100)
MICROALBUMIN UR-MCNC: 0.71 MG/DL (ref 0–1.99)
MICROALBUMIN/CREAT UR: 39.7 MG/G
PLATELET # BLD AUTO: 234 THOU/UL (ref 140–440)
PMV BLD AUTO: 12 FL (ref 8.5–12.5)
POTASSIUM SERPL-SCNC: 4.8 MMOL/L (ref 3.5–5)
PROT SERPL-MCNC: 5.4 G/DL (ref 6–8)
RBC # BLD AUTO: 5.42 MILL/UL (ref 3.8–5.4)
SODIUM SERPL-SCNC: 131 MMOL/L (ref 136–145)
TRIGL SERPL-MCNC: 139 MG/DL
TSH SERPL DL<=0.05 MIU/L-ACNC: 0.91 UIU/ML (ref 0.3–5)
WBC # BLD AUTO: 6.6 THOU/UL (ref 4–11)

## 2021-03-01 PROCEDURE — 36415 COLL VENOUS BLD VENIPUNCTURE: CPT | Performed by: FAMILY MEDICINE

## 2021-03-01 PROCEDURE — 99215 OFFICE O/P EST HI 40 MIN: CPT | Performed by: FAMILY MEDICINE

## 2021-03-01 NOTE — NURSING NOTE
Due to patient being non-English speaking/uses sign language, an  was used for this visit. Only for face-to-face interpretation by an external agency, date and length of interpretation can be found on the scanned worksheet.       name: Tray Louis  Language: Jennifer  Agency:  [employer]  Telephone number: [phone]  Type of interpretation:  Face-to-face, spoken

## 2021-03-01 NOTE — PROGRESS NOTES
Assessment & Plan     Type 2 diabetes mellitus without complication, with long-term current use of insulin (H)  Has been poorly controlled. Not check sugars regularly.  Stopped insulin a few weeks ago.  Agreed to restart the insulin at 26 units at night. Discussed that if sugars are still high with it that she may need more.  Agreed to continue other current diabetes meds including metformin and invokana.  We had a long conversation about chronic disease and how we don't have a cure and how it is difficult to manage.  She declines depression or counseling about this.    Recommended eye exam but she is declining. She knows she has cataracts but does not want surgery.  - Hemoglobin A1c ([facility])  - Microalbumin Random Ur ([facility])  - Lipid [place] ([facility]) - Results > 1 hr  - Comprehensive Metabolic ([facility]) - Results > 1 hr    Fatigue, unspecified type  Likely secondary to COVID vaccine previously but also has blood sugars in the 400-500s per her history discussed how this the likely cuase of making her poorly. DM control as above.   - CBC w/ Plt. ([facility])  - Thyroid [place] ([facility])  - Lipid [place] ([facility]) - Results > 1 hr  - Comprehensive Metabolic ([facility]) - Results > 1 hr    Coronary artery disease involving native heart with angina pectoris, unspecified vessel or lesion type (H)  No current symptoms.  Continue statin, beta blocker, statin and low dose ace inhibitor.    Hypercholesteremia  Rechecking cholesterol today.    Vitamin D deficiency  Rechecking level today.  - Vitamin D 25-Hydroxy ([facility])        50 minutes spent on the date of the encounter doing chart review, review of test results, patient visit, documentation and discussion with family            Return in about 2 weeks (around 3/15/2021) for Follow up, with me, using a phone visit diabetes and results.    Afia Cortez MD  [facility] LULA Barrett Mai is a 75 year old who presents for  the following health issues  accompanied by her daughter - in -law:    HPI       Had Covid vaccine on 2/11/21.  She felt fatigue and not well  for a couple weeks after the shot.  She had poor appetite and weakness.      She still feel weak and tired a lot.  She checks her blood sugar and it is really high in the 400-500s.  She stopped the insulin because she didn't want it anymore. Doesn't feel like it was helping.   She is having blurry vision.    No cp, sob, weakness, numbness tingling.  Doesn't like taking so many medications for a long time. Has repeatedly stopped medicines in the past then we discuss why again and she restarts for awhile.  Has good family support.    Review of Systems         Objective    /63   Pulse 83   Temp 98.8  F (37.1  C)   Resp 16   Wt 76.3 kg (168 lb 3.2 oz)   SpO2 97%   BMI 33.97 kg/m    Body mass index is 33.97 kg/m .  Physical Exam   GENERAL: healthy, alert and no distress  RESP: lungs clear to auscultation - no rales, rhonchi or wheezes  CV: regular rate and rhythm, normal S1 S2, no S3 or S4, no murmur, click or rub, no peripheral edema and peripheral pulses strong  MS: no gross musculoskeletal defects noted, no edema  Diabetic foot exam: normal DP and PT pulses, no trophic changes or ulcerative lesions and normal sensory exam

## 2021-03-01 NOTE — LETTER
March 8, 2021      Jenise Paz  [address] [facility] [address]        Dear ,    We are writing to inform you of your test results    Your diabetes is very poorly controlled. Your blood sugar was 666.  This is dangerously high if it is this high for too long. This is likely making you feel sick and not see well.  I recommend you restart the insulin as we discuss in your last visit.  Your cholesterol was good. Continue your medicine for this.  You have low protein in your body. This is likely because your nutrition has not been good from feeling sick from the uncontrolled diabetes.     Your blood count is a little low.  Your vitamin D level is very low. I will discuss increasing this at your next visit.    Resulted Orders   CBC w/ Plt. ([facility])   Result Value Ref Range    WBC 6.6 4.0 - 11.0 thou/uL    RBC 5.42 (H) 3.80 - 5.40 mill/uL    Hemoglobin 11.3 (L) 12.0 - 16.0 g/dL    Hematocrit 38.3 35.0 - 47.0 %    MCV 71 (L) 80 - 100 fL    MCH 20.8 (L) 27.0 - 34.0 pg    MCHC 29.5 (L) 32.0 - 36.0 g/dL    RDW 13.9 11.0 - 14.5 %    Platelets 234 140 - 440 thou/uL    Mean Platelet Volume 12.0 8.5 - 12.5 fL    Narrative    Test performed by:  [facility]  [address] [place] [address]   Vitamin D 25-Hydroxy ([facility])   Result Value Ref Range    Vitamin D,25-Hydroxy 9.5 (L) 30.0 - 80.0 ng/mL    Narrative    Test performed by:  [facility]  [address] [place] [address]  Deficiency <10.0 ng/mL  Insufficiency 10.0-29.9 ng/mL  Sufficiency 30.0-80.0 ng/mL  Toxicity (possible) >100.0 ng/mL   Thyroid [place] ([facility])   Result Value Ref Range    TSH 0.91 0.30 - 5.00 uIU/mL    Narrative    Test performed by:  [facility]  [address] [place] [address]   Microalbumin Random Ur ([facility])   Result Value Ref Range    Microalbumin, Urine 0.71 0.00 - 1.99 mg/dL    Creatinine, Urine 17.9 mg/dL     Albumin Urine mg/g Cr 39.7 (H) <=19.9 mg/g    Narrative    Test performed by:  [facility]  [address] [place] [address]  Microalbumin, Random Urine  <2.0 mg/dL . . . . . . . . Normal  3.0-30.0 mg/dL . . . . . . Microalbuminuria  >30.0 mg/dL . . . . . .  . Clinical Proteinuria  Microalbumin/Creatinine Ratio, Random Urine  <20 mg/g . . . . .. . . . Normal   mg/g . . . . . . . Microalbuminuria  >300 mg/g . . . . . . . . Clinical Proteinuria   Lipid [place] ([facility]) - Results > 1 hr   Result Value Ref Range    Cholesterol 115 <=199 mg/dL    Triglycerides 139 <=149 mg/dL    HDL Cholesterol 44 (L) >=50 mg/dL    LDL Cholesterol Calculated 43 <=129 mg/dL    Fasting? Unknown     Narrative    Test performed by:  [facility]  [address] [place] [address]   Comprehensive Metabolic ([facility]) - Results > 1 hr   Result Value Ref Range    Sodium 131 (L) 136 - 145 mmol/L    Potassium 4.8 3.5 - 5.0 mmol/L    Chloride 99 98 - 107 mmol/L    CO2, Total 25 22 - 31 mmol/L    Anion Gap 7 5 - 18 mmol/L    Glucose 666 (HH) 70 - 125 mg/dL    Urea Nitrogen 7 (L) 8 - 28 mg/dL    Creatinine 0.95 0.60 - 1.10 mg/dL    GFR Estimate If Black >60 >60 mL/min/1.73m2    GFR Estimate 58 (L) >60 mL/min/1.73m2    Bilirubin Total 0.3 0.0 - 1.0 mg/dL    Calcium 7.8 (L) 8.5 - 10.5 mg/dL    Protein Total 5.4 (L) 6.0 - 8.0 g/dL    Albumin 2.9 (L) 3.5 - 5.0 g/dL    Alkaline Phosphatase 161 (H) 45 - 120 U/L    AST (SGOT) 40 0 - 40 U/L    ALT (SGPT) 39 0 - 45 U/L    Narrative    Test performed by:  [facility]  [address] [place] [address]  Fasting Glucose reference range is 70-99 mg/dL per  American Diabetes Association (ADA) guidelines.   Glycosylated Hgb A1C ([facility])   Result Value Ref Range    Hemoglobin A1C >14.0 (H) <=5.6 %      Comment:      Prediabetes:   HBA1c       5.7 to 6.4%        Diabetes:        HBA1c         >=6.5%   Patients with Hgb F >5%, total bilirubin >10.0 mg/dL, abnormal red cell   turnover, severe renal or hepatic disease or malignancy should not have this   A1C method used to diagnose or monitor diabetes.          Narrative    Test performed by:  [facility]  [address] [place] [address]       If you have any questions or concerns, please call the clinic at the number listed above.       Sincerely,      Afia Cortez MD

## 2021-03-02 LAB — 25(OH)D3 SERPL-MCNC: 9.5 NG/ML (ref 30–80)

## 2021-03-04 LAB — HBA1C MFR BLD: >14 % (ref 0–5.7)

## 2021-03-06 NOTE — RESULT ENCOUNTER NOTE
Your diabetes is very poorly controlled. Your blood sugar was 666.  This is dangerously high if it is this high for too long. This is likely making you feel sick and not see well.  I recommend you restart the insulin as we discuss in your last visit.  Your cholesterol was good. Continue your medicine for this.  You have low protein in your body. This is likely because your nutrition has not been good from feeling sick from the uncontrolled diabetes.    Your blood count is a little low.  Your vitamin D level is very low. I will discuss increasing this at your next visit.

## 2021-03-16 DIAGNOSIS — I25.119 CORONARY ARTERY DISEASE INVOLVING NATIVE HEART WITH ANGINA PECTORIS, UNSPECIFIED VESSEL OR LESION TYPE (H): ICD-10-CM

## 2021-03-16 RX ORDER — METOPROLOL SUCCINATE 25 MG/1
TABLET, EXTENDED RELEASE ORAL
Qty: 90 TABLET | Refills: 4 | Status: SHIPPED | OUTPATIENT
Start: 2021-03-16 | End: 2022-06-09

## 2021-03-16 RX ORDER — ISOSORBIDE MONONITRATE 30 MG/1
TABLET, EXTENDED RELEASE ORAL
Qty: 90 TABLET | Refills: 4 | Status: SHIPPED | OUTPATIENT
Start: 2021-03-16 | End: 2022-06-09

## 2021-03-18 DIAGNOSIS — E78.00 HYPERCHOLESTEREMIA: ICD-10-CM

## 2021-03-19 ENCOUNTER — VIRTUAL VISIT (OUTPATIENT)
Dept: FAMILY MEDICINE | Facility: CLINIC | Age: 76
End: 2021-03-19
Payer: COMMERCIAL

## 2021-03-19 DIAGNOSIS — E11.9 TYPE 2 DIABETES MELLITUS WITHOUT COMPLICATION, WITH LONG-TERM CURRENT USE OF INSULIN (H): ICD-10-CM

## 2021-03-19 DIAGNOSIS — Z79.4 TYPE 2 DIABETES MELLITUS WITHOUT COMPLICATION, WITH LONG-TERM CURRENT USE OF INSULIN (H): ICD-10-CM

## 2021-03-19 PROCEDURE — 99442 PR PHYSICIAN TELEPHONE EVALUATION 11-20 MIN: CPT | Mod: 95 | Performed by: FAMILY MEDICINE

## 2021-03-19 RX ORDER — ATORVASTATIN CALCIUM 80 MG/1
80 TABLET, FILM COATED ORAL DAILY
Qty: 90 TABLET | Refills: 4 | Status: SHIPPED | OUTPATIENT
Start: 2021-03-19 | End: 2022-06-09

## 2021-03-19 NOTE — PROGRESS NOTES
Jenise is a 75 year old who is being evaluated via a billable telephone visit.      What phone number would you like to be contacted at? [phone]  How would you like to obtain your AVS? Mail a copy    Assessment & Plan     Type 2 diabetes mellitus without complication, with long-term current use of insulin (H)  Poorly controlled diabetes. Increase lantus to 34 units. Will likely need to increase more at next visit as well.  Patient has resumed her medications and is taking regularly.  I discussed all of the labs below in detail.  - insulin glargine (LANTUS SOLOSTAR) 100 UNIT/ML pen  Dispense: 45 mL; Refill: 3                   Return in about 2 weeks (around 4/2/2021) for Follow up, with me, in person diabetes.    Afia Cortez MD  [facility]   Jenise is a 75 year old who presents for the following health issues  accompanied by her  and daughter in law:    HPI     Diabetes Follow-up    How often are you checking your blood sugar? One time daily  What time of day are you checking your blood sugars (select all that apply)?  Before meals   Have you had any blood sugars above 200?  Yes 280- 350 fasting.   Have you had any blood sugars below 70?  No    What symptoms do you notice when your blood sugar is low?  None    What concerns do you have today about your diabetes? Blood sugar is often over 200     Do you have any of these symptoms? (Select all that apply)  Blurry vision    Have you had a diabetic eye exam in the last 12 months? No    She is taking insulin every day. No side effects. Children help her remember her medicines. Alarm set to remember her medicines.    BP Readings from Last 2 Encounters:   03/01/21 117/63   06/22/20 126/80     Hemoglobin A1C (%)   Date Value   03/01/2021 >14.0 (H)   06/22/2020 9.7 (H)     LDL Cholesterol Calculated (mg/dL)   Date Value   03/01/2021 43   06/22/2020 50           {Reference  Diabetes Log - 7 days :768808}          Review of  Systems         Objective           Vitals:  No vitals were obtained today due to virtual visit.    Physical Exam   healthy, alert and no distress  PSYCH: Alert and oriented times 3; coherent speech, normal   rate and volume, able to articulate logical thoughts, able   to abstract reason, no tangential thoughts, no hallucinations   or delusions  Her affect is normal  RESP: No cough, no audible wheezing, able to talk in full sentences  Remainder of exam unable to be completed due to telephone visits    Office Visit on 03/01/2021   Component Date Value Ref Range Status     WBC 03/01/2021 6.6  4.0 - 11.0 thou/uL Final     RBC 03/01/2021 5.42* 3.80 - 5.40 mill/uL Final     Hemoglobin 03/01/2021 11.3* 12.0 - 16.0 g/dL Final     Hematocrit 03/01/2021 38.3  35.0 - 47.0 % Final     MCV 03/01/2021 71* 80 - 100 fL Final     MCH 03/01/2021 20.8* 27.0 - 34.0 pg Final     MCHC 03/01/2021 29.5* 32.0 - 36.0 g/dL Final     RDW 03/01/2021 13.9  11.0 - 14.5 % Final     Platelets 03/01/2021 234  140 - 440 thou/uL Final     Mean Platelet Volume 03/01/2021 12.0  8.5 - 12.5 fL Final     Vitamin D,25-Hydroxy 03/01/2021 9.5* 30.0 - 80.0 ng/mL Final     TSH 03/01/2021 0.91  0.30 - 5.00 uIU/mL Final     Microalbumin, Urine 03/01/2021 0.71  0.00 - 1.99 mg/dL Final     Creatinine, Urine 03/01/2021 17.9  mg/dL Final     Albumin Urine mg/g Cr 03/01/2021 39.7* <=19.9 mg/g Final     Cholesterol 03/01/2021 115  <=199 mg/dL Final     Triglycerides 03/01/2021 139  <=149 mg/dL Final     HDL Cholesterol 03/01/2021 44* >=50 mg/dL Final     LDL Cholesterol Calculated 03/01/2021 43  <=129 mg/dL Final     Fasting? 03/01/2021 Unknown   Final     Sodium 03/01/2021 131* 136 - 145 mmol/L Final     Potassium 03/01/2021 4.8  3.5 - 5.0 mmol/L Final     Chloride 03/01/2021 99  98 - 107 mmol/L Final     CO2, Total 03/01/2021 25  22 - 31 mmol/L Final     Anion Gap 03/01/2021 7  5 - 18 mmol/L Final     Glucose 03/01/2021 666* 70 - 125 mg/dL Final     Urea Nitrogen  03/01/2021 7* 8 - 28 mg/dL Final     Creatinine 03/01/2021 0.95  0.60 - 1.10 mg/dL Final     GFR Estimate If Black 03/01/2021 >60  >60 mL/min/1.73m2 Final     GFR Estimate 03/01/2021 58* >60 mL/min/1.73m2 Final     Bilirubin Total 03/01/2021 0.3  0.0 - 1.0 mg/dL Final     Calcium 03/01/2021 7.8* 8.5 - 10.5 mg/dL Final     Protein Total 03/01/2021 5.4* 6.0 - 8.0 g/dL Final     Albumin 03/01/2021 2.9* 3.5 - 5.0 g/dL Final     Alkaline Phosphatase 03/01/2021 161* 45 - 120 U/L Final     AST (SGOT) 03/01/2021 40  0 - 40 U/L Final     ALT (SGPT) 03/01/2021 39  0 - 45 U/L Final     Hemoglobin A1C 03/01/2021 >14.0* <=5.6 % Final    Comment: Prediabetes:   HBA1c       5.7 to 6.4%        Diabetes:        HBA1c        >=6.5%   Patients with Hgb F >5%, total bilirubin >10.0 mg/dL, abnormal red cell   turnover, severe renal or hepatic disease or malignancy should not have this   A1C method used to diagnose or monitor diabetes.                       Phone call duration: 17 minutes

## 2021-03-19 NOTE — PROGRESS NOTES
"Jenise is a 75 year old who is being evaluated via a billable telephone visit.      What phone number would you like to be contacted at? ***  How would you like to obtain your AVS? {AVS Preference:494185}    {PROVIDER CHARTING PREFERENCE:056677}    Subjective   Jenise is a 75 year old who presents for the following health issues {ACCOMPANIED BY STATEMENT (Optional):582100}    HPI     {SUPERLIST (Optional):458854}  {additonal problems for provider to add (Optional):641681}    Review of Systems   {ROS COMP (Optional):723010}      Objective           Vitals:  No vitals were obtained today due to virtual visit.    Physical Exam   {GENERAL APPEARANCE:50::\"healthy\",\"alert\",\"no distress\"}  PSYCH: Alert and oriented times 3; coherent speech, normal   rate and volume, able to articulate logical thoughts, able   to abstract reason, no tangential thoughts, no hallucinations   or delusions  Her affect is { :8097496::\"normal\"}  RESP: No cough, no audible wheezing, able to talk in full sentences  Remainder of exam unable to be completed due to telephone visits    {Diagnostic Test Results (Optional):185120}    {AMBULATORY ATTESTATION (Optional):044796}        Phone call duration: *** minutes  "

## 2021-03-19 NOTE — NURSING NOTE
Due to patient being non-English speaking/uses sign language, an  was used for this visit. Only for face-to-face interpretation by an external agency, date and length of interpretation can be found on the scanned worksheet.     name: Joe [id number]  Agency: [employer]  Language: Jennifer   Telephone number: [phone]  Type of interpretation: Telephone, spoken

## 2021-04-01 DIAGNOSIS — E11.65 TYPE 2 DIABETES MELLITUS WITH HYPERGLYCEMIA, WITH LONG-TERM CURRENT USE OF INSULIN (H): Primary | ICD-10-CM

## 2021-04-01 DIAGNOSIS — Z79.4 TYPE 2 DIABETES MELLITUS WITH HYPERGLYCEMIA, WITH LONG-TERM CURRENT USE OF INSULIN (H): Primary | ICD-10-CM

## 2021-04-01 NOTE — PROGRESS NOTES
Patient no showed appointment.  CAlled sera in law Saida who usually comes to appoitnment with her.  She forgot to call to reschedule this AM.    She agrees to reschedule for 4/19 at 11AM.  Afia Cortez MD

## 2021-04-04 ENCOUNTER — HEALTH MAINTENANCE LETTER (OUTPATIENT)
Age: 76
End: 2021-04-04

## 2021-04-19 ENCOUNTER — OFFICE VISIT (OUTPATIENT)
Dept: FAMILY MEDICINE | Facility: CLINIC | Age: 76
End: 2021-04-19
Payer: COMMERCIAL

## 2021-04-19 VITALS
HEART RATE: 68 BPM | SYSTOLIC BLOOD PRESSURE: 126 MMHG | BODY MASS INDEX: 33.97 KG/M2 | WEIGHT: 168.2 LBS | TEMPERATURE: 98.3 F | DIASTOLIC BLOOD PRESSURE: 80 MMHG | RESPIRATION RATE: 16 BRPM

## 2021-04-19 DIAGNOSIS — Z79.4 TYPE 2 DIABETES MELLITUS WITH HYPERGLYCEMIA, WITH LONG-TERM CURRENT USE OF INSULIN (H): Primary | ICD-10-CM

## 2021-04-19 DIAGNOSIS — E11.65 TYPE 2 DIABETES MELLITUS WITH HYPERGLYCEMIA, WITH LONG-TERM CURRENT USE OF INSULIN (H): ICD-10-CM

## 2021-04-19 DIAGNOSIS — E11.65 TYPE 2 DIABETES MELLITUS WITH HYPERGLYCEMIA, WITH LONG-TERM CURRENT USE OF INSULIN (H): Primary | ICD-10-CM

## 2021-04-19 DIAGNOSIS — Z79.4 TYPE 2 DIABETES MELLITUS WITH HYPERGLYCEMIA, WITH LONG-TERM CURRENT USE OF INSULIN (H): ICD-10-CM

## 2021-04-19 LAB
BUN SERPL-MCNC: 19.8 MG/DL (ref 7–19)
CALCIUM SERPL-MCNC: 8.6 MG/DL (ref 8.5–10.1)
CHLORIDE SERPLBLD-SCNC: 102.4 MMOL/L (ref 98–110)
CO2 SERPL-SCNC: 28 MMOL/L (ref 20–32)
CREAT SERPL-MCNC: 0.6 MG/DL (ref 0.5–1)
GFR SERPL CREATININE-BSD FRML MDRD: >90 ML/MIN/1.7 M2
GLUCOSE SERPL-MCNC: 201.6 MG'DL (ref 70–99)
HBA1C MFR BLD: >14 % (ref 0–5.7)
POTASSIUM SERPL-SCNC: 3.6 MMOL/L (ref 3.2–4.6)
SODIUM SERPL-SCNC: 136.8 MMOL/L (ref 132–142)

## 2021-04-19 PROCEDURE — 80048 BASIC METABOLIC PNL TOTAL CA: CPT | Performed by: FAMILY MEDICINE

## 2021-04-19 PROCEDURE — 36415 COLL VENOUS BLD VENIPUNCTURE: CPT | Performed by: FAMILY MEDICINE

## 2021-04-19 PROCEDURE — 99214 OFFICE O/P EST MOD 30 MIN: CPT | Performed by: FAMILY MEDICINE

## 2021-04-19 NOTE — PROGRESS NOTES
Assessment & Plan     Type 2 diabetes mellitus with hyperglycemia, with long-term current use of insulin (H)  Still poor control. She is now taking medications and agrees to increase of insulin to 40 units.  Refilled test strips.  Will have every 2 week visits to adjust insulin until better control.   - Basic Metabolic Panel ([place])  - Glycosylated Hgb A1C ([facility])  - insulin glargine (LANTUS SOLOSTAR) 100 UNIT/ML pen  Dispense: 45 mL; Refill: 3  - blood glucose (NO BRAND SPECIFIED) test strip  Dispense: 180 strip; Refill: 3                   Return in about 2 weeks (around 5/3/2021) for Follow up, with me, using a phone visit Diabetes.    Afia Cortez MD  [facility] LULA Barrett Mai is a 75 year old who presents for the following health issues     HPI     Diabetes Follow-up    How often are you checking your blood sugar? A few times a week  What time of day are you checking your blood sugars (select all that apply)?  Before meals  Have you had any blood sugars above 200?  Yes over 300 at times.  Fasting is usually 250  Have you had any blood sugars below 70?  No    What symptoms do you notice when your blood sugar is low?  None    What concerns do you have today about your diabetes? None     Do you have any of these symptoms? (Select all that apply)  Numbness in feet, Burning in feet and Excessive thirst    Have you had a diabetic eye exam in the last 12 months? No Declining.    She has ringing in her ears when she doesn't take her medicines.  She feels better when taking them. She is taking them regularly.  She is taking insulin most days.  Did not bring meter with her.  Fasting sugars quite high, but lower than the past when they were in the >400.        BP Readings from Last 2 Encounters:   04/19/21 126/80   03/01/21 117/63     Hemoglobin A1C (%)   Date Value   03/01/2021 >14.0 (H)   06/22/2020 9.7 (H)     LDL Cholesterol Calculated (mg/dL)   Date Value   03/01/2021 43    06/22/2020 50                   Review of Systems         Objective    /80   Pulse 68   Temp 98.3  F (36.8  C)   Resp 16   Wt 76.3 kg (168 lb 3.2 oz)   BMI 33.97 kg/m    Body mass index is 33.97 kg/m .  Physical Exam   GENERAL: healthy, alert and no distress  RESP: lungs clear to auscultation - no rales, rhonchi or wheezes  CV: regular rate and rhythm, normal S1 S2, no S3 or S4, no murmur, click or rub, no peripheral edema and peripheral pulses strong  MS: no gross musculoskeletal defects noted, no edema

## 2021-04-20 NOTE — RESULT ENCOUNTER NOTE
Your A1c is still very high.  I recommend that we continue to increase your insulin every 2 weeks until we get better control.

## 2021-04-29 ENCOUNTER — TELEPHONE (OUTPATIENT)
Dept: FAMILY MEDICINE | Facility: CLINIC | Age: 76
End: 2021-04-29

## 2021-04-29 NOTE — TELEPHONE ENCOUNTER
"Daughter reports patient had a fall a \"few days\"ago  She now c/o hip pain. Daughter has not noticed bruising and unsure if there is swelling at the site. Daughter states she has noticed a change in pt's ability to walk even with her walker. Patient requesting pain medication. Patient was recently seen by dentist and was given hydrocodone which has help with her pain. Daughter states patient does not want to come into the clinic she would like pain medication ordered for her.    #[id number]     Note routed to Dr.Wicks Mirna VELASQUEZ  "

## 2021-04-29 NOTE — TELEPHONE ENCOUNTER
[facility] phone call message-patient reporting a symptom:     Symptom:    Pt fell and hurt her back. Needing pain medications.     Same Day Visit Offered: No    Additional comments:     None    OK to leave message on voice mail? Yes    Primary language: Hmong      needed? Yes    Call taken on April 29, 2021 at 2:33 PM by Iris Luis CMA

## 2021-04-30 ENCOUNTER — OFFICE VISIT (OUTPATIENT)
Dept: FAMILY MEDICINE | Facility: CLINIC | Age: 76
End: 2021-04-30
Payer: COMMERCIAL

## 2021-04-30 ENCOUNTER — RECORDS - HEALTHEAST (OUTPATIENT)
Dept: ADMINISTRATIVE | Facility: OTHER | Age: 76
End: 2021-04-30

## 2021-04-30 ENCOUNTER — TELEPHONE (OUTPATIENT)
Dept: FAMILY MEDICINE | Facility: CLINIC | Age: 76
End: 2021-04-30

## 2021-04-30 ENCOUNTER — ANCILLARY PROCEDURE (OUTPATIENT)
Dept: GENERAL RADIOLOGY | Facility: CLINIC | Age: 76
End: 2021-04-30
Payer: COMMERCIAL

## 2021-04-30 VITALS
OXYGEN SATURATION: 96 % | RESPIRATION RATE: 18 BRPM | HEART RATE: 82 BPM | TEMPERATURE: 98.1 F | DIASTOLIC BLOOD PRESSURE: 83 MMHG | SYSTOLIC BLOOD PRESSURE: 126 MMHG

## 2021-04-30 DIAGNOSIS — S32.010A COMPRESSION FRACTURE OF L1 VERTEBRA, INITIAL ENCOUNTER (H): ICD-10-CM

## 2021-04-30 DIAGNOSIS — M54.50 LUMBAR PAIN: Primary | ICD-10-CM

## 2021-04-30 PROCEDURE — 72100 X-RAY EXAM L-S SPINE 2/3 VWS: CPT | Mod: FY | Performed by: RADIOLOGY

## 2021-04-30 PROCEDURE — 99214 OFFICE O/P EST MOD 30 MIN: CPT | Mod: GC | Performed by: STUDENT IN AN ORGANIZED HEALTH CARE EDUCATION/TRAINING PROGRAM

## 2021-04-30 RX ORDER — CYCLOBENZAPRINE HCL 5 MG
5 TABLET ORAL
Qty: 10 TABLET | Refills: 0 | Status: SHIPPED | OUTPATIENT
Start: 2021-04-30 | End: 2024-07-20

## 2021-04-30 RX ORDER — CALCITONIN SALMON 200 [IU]/.09ML
1 SPRAY, METERED NASAL DAILY
Qty: 3.7 ML | Refills: 0 | Status: SHIPPED | OUTPATIENT
Start: 2021-04-30 | End: 2021-06-01

## 2021-04-30 RX ORDER — LIDOCAINE 50 MG/G
OINTMENT TOPICAL 3 TIMES DAILY PRN
Qty: 35 G | Refills: 0 | Status: SHIPPED | OUTPATIENT
Start: 2021-04-30 | End: 2021-05-17

## 2021-04-30 RX ORDER — CAPSAICIN 0.025 %
CREAM (GRAM) TOPICAL
Qty: 50 G | Refills: 0 | Status: SHIPPED | OUTPATIENT
Start: 2021-04-30 | End: 2021-05-17

## 2021-04-30 NOTE — PROGRESS NOTES
Assessment & Plan     Lumbar pain  Patient presents with lumbar pain after a fall one week prior. Fall was mechanical after slipping on stairs and falling onto her butt. Pain is limiting her walking and sleep, but has not had change in gait. No increased urinary or bowel incontinence, no radiating pain to her legs, and no numbness or tingling. Concern for fracture given history of lumbar DDD and osteopenia. Lumbar XR done in clinic, which is concerning for compression fracture of L1 given lumbar MRI on 1/28/19 showed essentially normal structure. Referral placed for CT of lumbar spine for further assessment, which patient agreed to do. Pain treated with calcitonin given presumed compression fracture, as well as a small amount of flexeril at night and topical capsaicin cream. Recommended alternating topical capsaicin with her OTC minty topical throughout the day. Has scheduled follow up with Dr. Cortez in one week.   - XR Lumbar Spine 2-3 Views*  - cyclobenzaprine (FLEXERIL) 5 MG tablet  Dispense: 10 tablet; Refill: 0  - lidocaine (XYLOCAINE) 5 % external ointment  Dispense: 35 g; Refill: 0      Diagnosis or treatment significantly limited by social determinants of health -   limited income, language barrier and low health literacy    Return in about 1 week (around 5/7/2021) for f/u fall.    Patient was discussed with attending physician, Dr. Judith Wood MD, who agrees with the assessment and plan.    Francesca Schroeder MD, PGY-1  [facility]  4/30/2021      Arnold Paz is a 75 year old female who presents for the following health issues  accompanied by her daughter-in-law:    Fall    She was going down the stairs and slipped on Thursday night, one week ago. She fell down about 2 steps leading to the backyard. The steps were slippery and she slipped and and fell backwards onto her butt. She had immediate pain in her lower back on both sides and her tailbone. Denies dizziness, LOC,  or hitting her head in the fall. Was not able to get up on her own.    Since then, she has been having pain in her bilateral low back which radiates around to her anterior hips. The greatest pain is in the center of her low back. Pain has been limiting her movement, now not able to walk as far due to pain. Gait has not changed, no limping has been noted. She has not had increased urinary or bowel incontinence. She denies pain radiating to her legs or any numbness or tingling.     Has tried a topical minty Chinese herbal medication which is helpful. She had hydrocodone leftover from dental procedures, 5-6 left. Took these plus tylenol, 1 pill of each every 6-7 hours which helped with sleep, but she is now out.     ROS: 10 point ROS neg other than the symptoms noted above in the HPI.    Objective    Vitals:    04/30/21 0830   BP: 126/83   Pulse: 82   Resp: 18   Temp: 98.1  F (36.7  C)   TempSrc: Tympanic   SpO2: 96%     There is no height or weight on file to calculate BMI.  Physical Exam   General: alert, appears comfortable, no acute distress  HEENT: atraumatic, conjunctiva clear without erythema, EOM's intact, mask in place  Neck: supple  Cardiac: appears well-perfused  Resp: breathing easily on room air, no increased work of breathing  Back: greatest tenderness to palpation of superior lumbar spine, also tender to palpation of lumbar paraspinous muscles  Extremities: no peripheral edema. Equal but weakened strength of bilateral lower extremities.   Skin: No edema, erythema, or ecchymosis of back  Neuro: CN's grossly intact. Able to stand from wheelchair without assistance.   Psych: affect congruent with mood    Xray - Reviewed and interpreted by me. Lumbar XR shows possible acute changes of L1 consistent with possible compression fracture

## 2021-04-30 NOTE — PATIENT INSTRUCTIONS
Plan:  1. It was a pleasure seeing you in clinic today.  2. We will do a lumbar xray of your back to look for a fracture. I will call Saida with results today.  3. Take Flexeril, which is a muscle relaxant, at night before bed. This should help with pain and sleep.   4. Use topical lidocaine up to three times a day on your lower back for pain. You can alternate this with the topical minty medication you are using now.   5. Keep using your binders if you find them helpful.  6. Follow up next week with Dr. Cortez during your telephone appointment.       [facility]  Phone: [phone]  Address: [address] [place] [address]    21   CT L-SPINE  [facility]   [phone]  Fax Orders to [phone]     Order faxed to [phone], they will contact patient to schedule.     Manda King

## 2021-04-30 NOTE — TELEPHONE ENCOUNTER
[facility] phone call message- general phone call:    Reason for call: the daughter called to ask for the Dr to call her back she had some more questions.     Return call needed: Yes    OK to leave a message on voice mail? Yes    Primary language: Hmong      needed? Yes    Call taken on April 30, 2021 at 12:18 PM by Krishna Espitia

## 2021-04-30 NOTE — NURSING NOTE
Due to patient being non-English speaking/uses sign language, an  was used for this visit. Only for face-to-face interpretation by an external agency, date and length of interpretation can be found on the scanned worksheet.     name: Markel ID: [id number]  Agency: [employer]  Language: Jennifer   Telephone number:   Type of interpretation: Telemedicine, spoken

## 2021-04-30 NOTE — PROGRESS NOTES
Preceptor attestation:  Vital signs reviewed: /83   Pulse 82   Temp 98.1  F (36.7  C) (Tympanic)   Resp 18   SpO2 96%     Patient seen, evaluated, and discussed with the resident.  I have verified the content of the note, which accurately reflects my assessment of the patient and the plan of care.    I reviewed the x-rays, and I agree with Dr. Schroeder's interpretation.    Supervising physician: Judith Wood MD  [facility]

## 2021-05-03 NOTE — RESULT ENCOUNTER NOTE
Discussed x-ray results showing L1 compression fracture with patient's daughter-in-law by phone following visit on 4/30/21 as previously agreed upon. SREEDHARM.

## 2021-05-07 ENCOUNTER — VIRTUAL VISIT (OUTPATIENT)
Dept: FAMILY MEDICINE | Facility: CLINIC | Age: 76
End: 2021-05-07
Payer: COMMERCIAL

## 2021-05-07 DIAGNOSIS — S32.010D COMPRESSION FRACTURE OF L1 VERTEBRA WITH ROUTINE HEALING: Primary | ICD-10-CM

## 2021-05-07 DIAGNOSIS — M85.89 OSTEOPENIA OF MULTIPLE SITES: ICD-10-CM

## 2021-05-07 DIAGNOSIS — E11.9 TYPE 2 DIABETES MELLITUS WITHOUT COMPLICATION, WITH LONG-TERM CURRENT USE OF INSULIN (H): ICD-10-CM

## 2021-05-07 DIAGNOSIS — Z79.4 TYPE 2 DIABETES MELLITUS WITHOUT COMPLICATION, WITH LONG-TERM CURRENT USE OF INSULIN (H): ICD-10-CM

## 2021-05-07 PROCEDURE — 99214 OFFICE O/P EST MOD 30 MIN: CPT | Mod: 95 | Performed by: FAMILY MEDICINE

## 2021-05-07 RX ORDER — OXYCODONE HYDROCHLORIDE 5 MG/1
5 TABLET ORAL EVERY 8 HOURS PRN
Qty: 20 TABLET | Refills: 0 | Status: SHIPPED | OUTPATIENT
Start: 2021-05-07 | End: 2021-05-17

## 2021-05-07 NOTE — PROGRESS NOTES
Jenise is a 75 year old who is being evaluated via a billable telephone visit.      What phone number would you like to be contacted at? [phone]  How would you like to obtain your AVS? Mail a copy    Assessment & Plan     Compression fracture of L1 vertebra with routine healing  Seems likely this is an a acute compression fracture. Patient declining CT to clarify.  Pain is severe and she is not walking or sleeping.  Will do short term opioids for functional improvement. Warned of constipation, dizziness, lightheadedness nausea and overdose risks. Take as prescribed.  She agrees.  - oxyCODONE (ROXICODONE) 5 MG tablet  Dispense: 20 tablet; Refill: 0    Osteopenia of multiple sites  Will discuss alendronate at the next visit. Patient was not ready to discuss this today due to the pain.    Type 2 diabetes mellitus without complication, with long-term current use of insulin (H)  Poorly controlled.  Stopped insulin due to pain.  Discussed that taking insulin will help fracture heal.  Discussed that should restart insulin and we will discuss more at the next visit and adjust insulin as needed.  Patient had left call but this discussion had with the daughter in law who does her medications. Not sure if the patient agreed to restart.         I spent a total of 32 minutes on the day of the visit.   Time spent doing chart review, history and exam, documentation and further activities per the note           Follow up in one week for pain.  Scheduled 5/17 2:50 PM in person.    Afia Cortez MD  [facility]   Jenise is a 75 year old who presents for the following health issues  accompanied by her daughter in law:    HPI     Diabetes Follow-up    PAtient stopped insulin due to current pain.      BP Readings from Last 2 Encounters:   04/30/21 126/83   04/19/21 126/80     Hemoglobin A1C (%)   Date Value   04/19/2021 >14.0 (H)   03/01/2021 >14.0 (H)     LDL Cholesterol Calculated (mg/dL)   Date  "Value   03/01/2021 43   06/22/2020 50                  Back Pain  Onset/Duration: Acute, had a fall last week and saw here on 4/30. Reviewed note in detail. I also reviewed the Xrays.   Description:   Location of pain: low back both  Character of pain: sharp and constant  Pain radiation: radiates into the right buttocks, radiates into the right leg, radiates into the left buttocks and radiates into the left leg  New numbness or weakness in legs, not attributed to pain: no  Intensity: Currently 10/10, At its worst 10/10  Progression of Symptoms: same  History:   Specific cause: fall   Pain interferes with job: not applicable  History of back problems: previous degenerative joint disease of the lumbar spine  Any previous MRI or X-rays: Yes- at [facility].  Date 2019  Sees a specialist for back pain: No  Alleviating factors:   Improved by: lidocaine cream, tylenol, cyclobenzaprine.  Using calcitonin spray.    Precipitating factors:  Worsened by: Standing  Therapies tried and outcome: acetaminophen (Tylenol), cold, heat, muscle relaxants and rest    Accompanying Signs & Symptoms:  Risk of Fracture: Recent history of trauma or blunt force, Age >64 and Osteopenia  Risk of Cauda Equina: None  Risk of Infection: None  Risk of Cancer: None  Risk of Ankylosing Spondylitis: Onset at age <35, male, AND morning back stiffness no          Review of Systems         Objective    Vitals - Patient Reported  Weight (Patient Reported): 72.6 kg (160 lb)  Height (Patient Reported): 147.3 cm (4' 10\")  BMI (Based on Pt Reported Ht/Wt): 33.44        Physical Exam   alert, no distress and in pain  PSYCH: Alert and oriented times 3; coherent speech, normal   rate and volume, able to articulate logical thoughts, able   to abstract reason, no tangential thoughts, no hallucinations   or delusions  Her affect is normal  RESP: No cough, no audible wheezing, able to talk in full sentences  Remainder of exam unable to be completed due to telephone " visits                Phone call duration: 30 minutes

## 2021-05-17 ENCOUNTER — OFFICE VISIT (OUTPATIENT)
Dept: FAMILY MEDICINE | Facility: CLINIC | Age: 76
End: 2021-05-17
Payer: COMMERCIAL

## 2021-05-17 VITALS
SYSTOLIC BLOOD PRESSURE: 130 MMHG | HEART RATE: 78 BPM | RESPIRATION RATE: 16 BRPM | DIASTOLIC BLOOD PRESSURE: 75 MMHG | TEMPERATURE: 98.5 F | OXYGEN SATURATION: 97 %

## 2021-05-17 DIAGNOSIS — M54.50 LUMBAR PAIN: ICD-10-CM

## 2021-05-17 DIAGNOSIS — S32.010A COMPRESSION FRACTURE OF L1 VERTEBRA, INITIAL ENCOUNTER (H): ICD-10-CM

## 2021-05-17 DIAGNOSIS — M85.89 OSTEOPENIA OF MULTIPLE SITES: ICD-10-CM

## 2021-05-17 DIAGNOSIS — S32.010D COMPRESSION FRACTURE OF L1 VERTEBRA WITH ROUTINE HEALING: Primary | ICD-10-CM

## 2021-05-17 PROCEDURE — 99214 OFFICE O/P EST MOD 30 MIN: CPT | Performed by: FAMILY MEDICINE

## 2021-05-17 RX ORDER — OXYCODONE HYDROCHLORIDE 5 MG/1
5 TABLET ORAL EVERY 8 HOURS PRN
Qty: 42 TABLET | Refills: 0 | Status: SHIPPED | OUTPATIENT
Start: 2021-05-17 | End: 2021-06-03

## 2021-05-17 RX ORDER — LIDOCAINE 50 MG/G
OINTMENT TOPICAL 3 TIMES DAILY PRN
Qty: 35 G | Refills: 0 | Status: SHIPPED | OUTPATIENT
Start: 2021-05-17 | End: 2021-05-17

## 2021-05-17 RX ORDER — CAPSAICIN 0.025 %
CREAM (GRAM) TOPICAL
Qty: 50 G | Refills: 11 | Status: SHIPPED | OUTPATIENT
Start: 2021-05-17 | End: 2022-07-24

## 2021-05-17 RX ORDER — ALENDRONATE SODIUM 70 MG/1
70 TABLET ORAL
Qty: 12 TABLET | Refills: 4 | Status: SHIPPED | OUTPATIENT
Start: 2021-05-17 | End: 2023-04-03

## 2021-05-17 RX ORDER — LIDOCAINE 50 MG/G
OINTMENT TOPICAL 3 TIMES DAILY PRN
Qty: 35 G | Refills: 0 | Status: SHIPPED | OUTPATIENT
Start: 2021-05-17 | End: 2021-08-30

## 2021-05-17 NOTE — PROGRESS NOTES
Assessment & Plan     Compression fracture of L1 vertebra with routine healing  Better controlled with oxycodone.  Will give 2 more weeks of three jer a day medication.  Offered PT but she declined.  REfilled topical creams.  Lidocaine wasn't covered represcribed and asked for PA.      - oxyCODONE (ROXICODONE) 5 MG tablet  Dispense: 42 tablet; Refill: 0  - alendronate (FOSAMAX) 70 MG tablet  Dispense: 12 tablet; Refill: 4  - lidocaine (XYLOCAINE) 5 % external ointment  Dispense: 35 g; Refill: 0  - capsaicin (ZOSTRIX) 0.025 % external cream  Dispense: 50 g; Refill: 11    Osteopenia of multiple sites  Agreed to alendronate weekly to prevent future fractures.  - alendronate (FOSAMAX) 70 MG tablet  Dispense: 12 tablet; Refill: 4    Diabetes: very poorly controlled. She is not taking any of her medication except those for pain.  She has the goal to be comfortable and is not interested in restarting other meds at this time.                     Two weeks for virtual visit with me for pain.     Afia Cortez MD  [facility]    Arnold Burden is a 75 year old who presents for the following health issues     HPI         New compression fracture of L1 after a fall. Need to discuss alendronate as she already has osteopenia in 2019 and now has a compression fracture, new DEXA recently. ordered. Also had started opioids for pain control at last visit.  She is having better pain control. Taking pain pills three times a day.  Also maximizing tylenol.  She is getting up more and sleeping ok.  No leg symptoms, no trouble with bowel or bladder.  She is going for the CT of back later this week.     Had stopped taking all other meds at last visit due to pain and still not taking them.      Review of Systems         Objective    /75 (BP Location: Right arm, Patient Position: Sitting, Cuff Size: Adult Regular)   Pulse 78   Temp 98.5  F (36.9  C) (Oral)   Resp 16   SpO2 97%   There is no height or  weight on file to calculate BMI.  Physical Exam   GENERAL: In wheel chair, alert and no distress  RESP: lungs clear to auscultation - no rales, rhonchi or wheezes  CV: regular rate and rhythm, normal S1 S2, no S3 or S4, no murmur, click or rub, no peripheral edema and peripheral pulses strong  NEURO: Normal strength and tone, mentation intact and speech normal Negative SLR bilaterally.  BACK: Back is tender right at L1 spinous process. No other tenderness in the back.

## 2021-05-17 NOTE — NURSING NOTE
Due to patient being non-English speaking/uses sign language, an  was used for this visit. Only for face-to-face interpretation by an external agency, date and length of interpretation can be found on the scanned worksheet.       name: Usama Yun  Language: Jennifer  Agency:  [employer]  Telephone number: [phone]  Type of interpretation:  Telephone, spoken

## 2021-05-20 ENCOUNTER — HOSPITAL ENCOUNTER (OUTPATIENT)
Dept: CT IMAGING | Facility: CLINIC | Age: 76
Discharge: HOME | End: 2021-05-20
Payer: COMMERCIAL

## 2021-05-20 ENCOUNTER — TELEPHONE (OUTPATIENT)
Dept: FAMILY MEDICINE | Facility: CLINIC | Age: 76
End: 2021-05-20

## 2021-05-20 DIAGNOSIS — S32.010A COMPRESSION FRACTURE OF L1 VERTEBRA, INITIAL ENCOUNTER (H): ICD-10-CM

## 2021-05-24 ENCOUNTER — RECORDS - HEALTHEAST (OUTPATIENT)
Dept: ADMINISTRATIVE | Facility: CLINIC | Age: 76
End: 2021-05-24

## 2021-05-25 ENCOUNTER — TELEPHONE (OUTPATIENT)
Dept: FAMILY MEDICINE | Facility: CLINIC | Age: 76
End: 2021-05-25

## 2021-05-25 NOTE — TELEPHONE ENCOUNTER
Called patient and talked to main care giver, daughter in law about CT results from last week.  Results were consistent with Xray, history and exam that was leading us to believe that this was an acute compression fracture.  No changes in treatment at this time as the patient is feeling a bit better.  Discussed chronic findings as well and patient had previously declined referral, injections or procedures for the bilateral foraminal stenosis at L5-S1.  We can discuss further at 6/3 as well, but daughter in law expects that she would not like to pursue any treatment at this time.    IMPRESSION:   1.  Acute 10% L1 compression fracture with 1 mm of bony retropulsion.   2.  Mild effacement of the right lateral recess posterior to the L1 superior endplate.   3.  No other fracture.   4.  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     Afia Cortez MD

## 2021-05-29 ENCOUNTER — RECORDS - HEALTHEAST (OUTPATIENT)
Dept: ADMINISTRATIVE | Facility: CLINIC | Age: 76
End: 2021-05-29

## 2021-05-29 DIAGNOSIS — S32.010A COMPRESSION FRACTURE OF L1 VERTEBRA, INITIAL ENCOUNTER (H): ICD-10-CM

## 2021-06-01 VITALS — WEIGHT: 175.6 LBS | BODY MASS INDEX: 36.86 KG/M2 | HEIGHT: 58 IN

## 2021-06-01 RX ORDER — CALCITONIN SALMON 200 [IU]/.09ML
SPRAY, METERED NASAL
Qty: 3.7 ML | Refills: 0 | Status: SHIPPED | OUTPATIENT
Start: 2021-06-01 | End: 2021-07-19

## 2021-06-02 ENCOUNTER — RECORDS - HEALTHEAST (OUTPATIENT)
Dept: ADMINISTRATIVE | Facility: CLINIC | Age: 76
End: 2021-06-02

## 2021-06-03 ENCOUNTER — VIRTUAL VISIT (OUTPATIENT)
Dept: FAMILY MEDICINE | Facility: CLINIC | Age: 76
End: 2021-06-03
Payer: COMMERCIAL

## 2021-06-03 DIAGNOSIS — E11.9 TYPE 2 DIABETES MELLITUS WITHOUT COMPLICATION, WITH LONG-TERM CURRENT USE OF INSULIN (H): ICD-10-CM

## 2021-06-03 DIAGNOSIS — Z79.4 TYPE 2 DIABETES MELLITUS WITHOUT COMPLICATION, WITH LONG-TERM CURRENT USE OF INSULIN (H): ICD-10-CM

## 2021-06-03 DIAGNOSIS — E46 PROTEIN-CALORIE MALNUTRITION, UNSPECIFIED SEVERITY (H): ICD-10-CM

## 2021-06-03 DIAGNOSIS — S32.010D COMPRESSION FRACTURE OF L1 VERTEBRA WITH ROUTINE HEALING: Primary | ICD-10-CM

## 2021-06-03 DIAGNOSIS — R63.0 POOR APPETITE: ICD-10-CM

## 2021-06-03 PROCEDURE — 99214 OFFICE O/P EST MOD 30 MIN: CPT | Mod: 95 | Performed by: FAMILY MEDICINE

## 2021-06-03 RX ORDER — FAMOTIDINE 20 MG/1
20 TABLET, FILM COATED ORAL 2 TIMES DAILY PRN
Qty: 60 TABLET | Refills: 11 | Status: SHIPPED | OUTPATIENT
Start: 2021-06-03 | End: 2023-04-03

## 2021-06-03 NOTE — PROGRESS NOTES
Jenise is a 75 year old who is being evaluated via a billable telephone visit.      What phone number would you like to be contacted at? [phone]  How would you like to obtain your AVS? MyChart    Assessment & Plan     Type 2 diabetes mellitus without complication, with long-term current use of insulin (H)  Poor control. Taking meds intermittently. Does not check sugars regularly. She is not interested in changing meds or management.    Compression fracture of L1 vertebra with routine healing  Pain improved. Continue current management. She is tolerating alendronate for osteoporosis.    Poor appetite  May be GERD, gastroparesis from poorly controlled diabetes, vs failure to thrive. Sour taste makes GERD likely will try famotidine and get her supplements to help with nutrition.  - famotidine (PEPCID) 20 MG tablet  Dispense: 60 tablet; Refill: 11  - Nutritional Supplements (GLUCERNA ADVANCE SHAKE) LIQD  Dispense: 08500 mL; Refill: 11    Protein-calorie malnutrition, unspecified severity (H)  Eating very little and low protein on last labs.  Will supplement for malnutrition and low threshold for EGD and further work up for cause.  - Nutritional Supplements (GLUCERNA ADVANCE SHAKE) LIQD  Dispense: 09945 mL; Refill: 11 July 19 11 AM in person  Afia Cortez MD  [facility]   Jenise is a 75 year old who presents for the following health issues     HPI         Her back is feeling much better after compression fracture.  She is still having a little pain sometimes.  She is using pain cream and it is helping.  Kids are massaging as well which helps.  She is not taking any pain medication.  She is taking alendronate weekly.  She is tolerating it okay.  Just taking tylenol.    Appetite is not good.  No stomach pain. Normal stool. No constipation. She doesn't feel too full.  Food doesn't taste good.  No trouble swallowing. She would like a dietary supplement.      She has  poorly controlled diabetes and had stopped her medicines when she was in pain.  She states she forgets her medicines sometimes.     Review of Systems         Objective           Vitals:  No vitals were obtained today due to virtual visit.    Physical Exam   healthy, alert and no distress  PSYCH: Alert and oriented times 3; coherent speech, normal   rate and volume, able to articulate logical thoughts, able   to abstract reason, no tangential thoughts, no hallucinations   or delusions  Her affect is normal  RESP: No cough, no audible wheezing, able to talk in full sentences  Remainder of exam unable to be completed due to telephone visits                Phone call duration: 25 minutes

## 2021-06-11 ENCOUNTER — TELEPHONE (OUTPATIENT)
Dept: FAMILY MEDICINE | Facility: CLINIC | Age: 76
End: 2021-06-11
Payer: COMMERCIAL

## 2021-06-14 DIAGNOSIS — E11.9 TYPE 2 DIABETES MELLITUS WITHOUT COMPLICATION, WITHOUT LONG-TERM CURRENT USE OF INSULIN (H): ICD-10-CM

## 2021-06-14 RX ORDER — METFORMIN HCL 500 MG
TABLET, EXTENDED RELEASE 24 HR ORAL
Qty: 360 TABLET | Refills: 3 | Status: SHIPPED | OUTPATIENT
Start: 2021-06-14 | End: 2022-06-09

## 2021-06-14 NOTE — TELEPHONE ENCOUNTER
Will leave this for Dr Cortez to address upon her return - having insurance pay for nutritional supplements has requirements that I am not sure this patient meets or not - Krishna Flowers
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
## 2021-06-17 NOTE — ANESTHESIA POSTPROCEDURE EVALUATION
Patient: Jenise Paz  CHOLECYSTECTOMY, LAPAROSCOPIC  Anesthesia type: general    Patient location: PACU  Last vitals:   Vitals:    04/25/18 2350   BP: 109/55   Pulse: 72   Resp: 18   Temp: 37.3  C (99.2  F)   SpO2: 95%     Post vital signs: stable  Level of consciousness: awake and responds to simple questions  Post-anesthesia pain: pain controlled  Post-anesthesia nausea and vomiting: no  Pulmonary: unassisted, return to baseline  Cardiovascular: stable and blood pressure at baseline  Hydration: adequate  Anesthetic events: no    QCDR Measures:  ASA# 11 - Lindsay-op Cardiac Arrest: ASA11B - Patient did NOT experience unanticipated cardiac arrest  ASA# 12 - Lindsay-op Mortality Rate: ASA12B - Patient did NOT die  ASA# 13 - PACU Re-Intubation Rate: ASA13B - Patient did NOT require a new airway mgmt  ASA# 10 - Composite Anes Safety: ASA10A - No serious adverse event    Additional Notes:

## 2021-06-17 NOTE — ANESTHESIA PREPROCEDURE EVALUATION
Anesthesia Evaluation      Patient summary reviewed   No history of anesthetic complications     Airway   Mallampati: III  Neck ROM: full   Pulmonary - negative ROS and normal exam    breath sounds clear to auscultation                         Cardiovascular   (+) hypertension, CAD, CABG/stent (2016), , hypercholesterolemia,     ECG reviewed  Rhythm: regular  Rate: normal,         Neuro/Psych - negative ROS     Endo/Other    (+) diabetes mellitus poorly controlled,      GI/Hepatic/Renal      Comments: cholecystitis     Other findings: Echo 08/2016  Summary   1. Normal left ventricular size and systolic performance. The ejection   fraction is estimated to be 55%.   2. There is mild-moderate basal inferior hypokinesis.   3. No significant valvular heart disease is identified on this study.   4. There is a fairly small posterior pericardial effusion (unchanged from   previous).       When compared to the prior real-time echocardiogram dated 12 April 2016,   the inferior wall motion abnormality is not as pronounced on the current   study.      Dental    (+) poor dentition                       Anesthesia Plan  Planned anesthetic: general endotracheal  Modified RSI w/ ethan  Sugammadex reversal  Glidescope  ASA 3 - emergent   Induction: intravenous   Anesthetic plan and risks discussed with: patient, child/children and healthcare power of  (son interpreting (pt declined ))  Anesthesia plan special considerations: antiemetics,   Post-op plan: routine recovery

## 2021-06-17 NOTE — ANESTHESIA CARE TRANSFER NOTE
Last vitals:   Vitals:    04/25/18 2150   BP: 170/77   Pulse: 89   Resp: 22   Temp: 36.3  C (97.4  F)   SpO2: 98%   In OR, spont respir, , MELLO, sustained head lift, sx and extubated to 02 via FM, spont respir, transported to PACU, VSS, report to RN.   Patient's level of consciousness is drowsy  Spontaneous respirations: yes  Maintains airway independently: yes  Dentition unchanged: yes  Oropharynx: oropharynx clear of all foreign objects    QCDR Measures:  ASA# 20 - Surgical Safety Checklist: WHO surgical safety checklist completed prior to induction  PQRS# 430 - Adult PONV Prevention: 4558F - Pt received => 2 anti-emetic agents (different classes) preop & intraop  ASA# 8 - Peds PONV Prevention: NA - Not pediatric patient, not GA or 2 or more risk factors NOT present  PQRS# 424 - Lindsay-op Temp Management: 4559F - At least one body temp DOCUMENTED => 35.5C or 95.9F within required timeframe  PQRS# 426 - PACU Transfer Protocol: - Transfer of care checklist used  ASA# 14 - Acute Post-op Pain: ASA14B - Patient did NOT experience pain >= 7 out of 10

## 2021-06-23 ENCOUNTER — TELEPHONE (OUTPATIENT)
Dept: FAMILY MEDICINE | Facility: CLINIC | Age: 76
End: 2021-06-23

## 2021-06-23 NOTE — TELEPHONE ENCOUNTER
Prior Authorization Retail Medication Request    Medication/Dose: Glucerna  ICD code (if different than what is on RX):    Poor appetite [R63.0]       Protein-calorie malnutrition, unspecified severity (H) [E46]           Previously Tried and Failed:    Rationale:      Ins[place]  [place] [facility] [place]:  [id number]       Pharmacy Information (if different than what is on RX)  Name:     [id number] - [place], [place] - [address]         Phone:  [phone]

## 2021-06-23 NOTE — TELEPHONE ENCOUNTER
Send PA. If not covered with PA they will have to pay out of pocket. This was discussed with the family during the visit on 6/3.  Afia Cortez MD  Routed to [facility] [place].

## 2021-06-24 NOTE — TELEPHONE ENCOUNTER
PA Initiation    Medication: Nutritional Supplements (GLUCERNA ADVANCE SHAKE) LIQD   Insurance Company: [employer] - Phone [phone] Fax [phone]  Pharmacy Filling the Rx: [id number] - [place], [place] - [address]  Filling Pharmacy Phone: [phone]  Filling Pharmacy Fax: [phone]  Start Date: 6/24/2021

## 2021-06-24 NOTE — TELEPHONE ENCOUNTER
PRIOR AUTHORIZATION DENIED    Medication: Nutritional Supplements (GLUCERNA ADVANCE SHAKE) LIQD--DENIED    Denial Date: 6/24/2021    Denial Rational: Available over the counter, OTC medications are excluded from coverage.     Appeal Information:

## 2021-07-17 DIAGNOSIS — S32.010A COMPRESSION FRACTURE OF L1 VERTEBRA, INITIAL ENCOUNTER (H): ICD-10-CM

## 2021-07-19 RX ORDER — CALCITONIN SALMON 200 [IU]/.09ML
SPRAY, METERED NASAL
Qty: 3.7 ML | Refills: 0 | Status: SHIPPED | OUTPATIENT
Start: 2021-07-19 | End: 2021-08-30

## 2021-08-30 ENCOUNTER — OFFICE VISIT (OUTPATIENT)
Dept: FAMILY MEDICINE | Facility: CLINIC | Age: 76
End: 2021-08-30
Payer: COMMERCIAL

## 2021-08-30 VITALS
OXYGEN SATURATION: 96 % | TEMPERATURE: 98.9 F | HEART RATE: 85 BPM | DIASTOLIC BLOOD PRESSURE: 69 MMHG | BODY MASS INDEX: 28.72 KG/M2 | RESPIRATION RATE: 16 BRPM | WEIGHT: 142.2 LBS | SYSTOLIC BLOOD PRESSURE: 107 MMHG

## 2021-08-30 DIAGNOSIS — Z12.31 ENCOUNTER FOR SCREENING MAMMOGRAM FOR BREAST CANCER: ICD-10-CM

## 2021-08-30 DIAGNOSIS — M54.42 CHRONIC LEFT-SIDED LOW BACK PAIN WITH LEFT-SIDED SCIATICA: ICD-10-CM

## 2021-08-30 DIAGNOSIS — G89.29 CHRONIC LEFT-SIDED LOW BACK PAIN WITH LEFT-SIDED SCIATICA: ICD-10-CM

## 2021-08-30 DIAGNOSIS — E11.65 TYPE 2 DIABETES MELLITUS WITH HYPERGLYCEMIA, WITHOUT LONG-TERM CURRENT USE OF INSULIN (H): Primary | ICD-10-CM

## 2021-08-30 DIAGNOSIS — Z12.11 COLON CANCER SCREENING: ICD-10-CM

## 2021-08-30 LAB — HBA1C MFR BLD: >14 %

## 2021-08-30 PROCEDURE — 36415 COLL VENOUS BLD VENIPUNCTURE: CPT | Performed by: FAMILY MEDICINE

## 2021-08-30 PROCEDURE — 83036 HEMOGLOBIN GLYCOSYLATED A1C: CPT | Performed by: FAMILY MEDICINE

## 2021-08-30 PROCEDURE — 99215 OFFICE O/P EST HI 40 MIN: CPT | Performed by: FAMILY MEDICINE

## 2021-08-30 RX ORDER — GABAPENTIN 300 MG/1
300 CAPSULE ORAL
Qty: 60 CAPSULE | Refills: 11 | Status: SHIPPED | OUTPATIENT
Start: 2021-08-30 | End: 2022-09-23

## 2021-08-30 NOTE — LETTER
October 6, 2021      Jenise Paz  [address] [place] [address]        Dear ,    We are writing to inform you of your test results.    Normal colon cancer screening.   Your diabetes is still very poorly controlled.  HgbA1c 14%.   Please schedule a visit so we can discuss better control.       Resulted Orders   Hemoglobin A1c   Result Value Ref Range    Hemoglobin A1C >14.0 (H) <=5.6 %      Comment:        Prediabetes: 5.7 to 6.4%        Diabetes:  >=6.5%     Patients with Hgb F >5%, total bilirubin >10.0 mg/dL, abnormal red cell turnover, severe renal or hepatic disease or malignancy should not have this A1C method used to diagnose or monitor diabetes.    Fecal colorectal cancer screen FIT   Result Value Ref Range    Occult Blood Screen FIT Negative Negative       If you have any questions or concerns, please call the clinic at the number listed above.       Sincerely,      Afia Cortez MD

## 2021-08-30 NOTE — PROGRESS NOTES
Assessment & Plan     Type 2 diabetes mellitus with hyperglycemia, without long-term current use of insulin (H)  Has been very poorly controlled as she had previously stopped all of her medications. Since the last visit she has restarted her oral medications, but is not taking any insulin at this time. A1c ordered. Increased invokana to 300mg.   - Hemoglobin A1c  - canagliflozin (INVOKANA) 300 MG tablet  Dispense: 90 tablet; Refill: 4  - Hemoglobin A1c    Chronic left-sided low back pain with left-sided sciatica  Continues to have some back pain which is warm and burning in nature.  She ran out of gabapentin, restart this today.  Discuss using ice, menthol creams.  She also does use capsiacin for some pain just not the burning pains.  Declines PT.  - gabapentin (NEURONTIN) 300 MG capsule  Dispense: 60 capsule; Refill: 11    Encounter for screening mammogram for breast cancer  Agreed to physical and mammogram for screening.  - PCS Use: Screening Digital Bilateral Mammogram    Colon cancer screening  Agreed to do FIT testing again. AGreed to colonoscopy if it is positive.  - Fecal colorectal cancer screen FIT  - Fecal colorectal cancer screen FIT        I spent a total of 43 minutes on the day of the visit.   Time spent doing chart review, history and exam, documentation and further activities per the note           Follow up sooner if A1c still poorly controlled 2-4 weeks to discuss restarting insulin.   If improved will see her in 1-2 months.  Afia Cortez MD  [facility] LULA Barrett Mai is a 76 year old who presents for the following health issues  accompanied by her daughter:    HPI     Diabetes Follow-up    How often are you checking your blood sugar? A few times a week, usually once a day, but sometimes doesn't check.  What time of day are you checking your blood sugars (select all that apply)?  Before and after meals  Have you had any blood sugars above 200?  Yes fasting  220s  Have you had any blood sugars below 70?  No    What symptoms do you notice when your blood sugar is low?  None    What concerns do you have today about your diabetes? None, wants to have enough energy.     Do you have any of these symptoms? (Select all that apply)  Burning in feet, Excessive thirst and Blurry vision Has a hot feeling in her burning feeling all the time in chest and legs and feet.      BP Readings from Last 2 Encounters:   08/30/21 107/69   05/17/21 130/75     Hemoglobin A1C (%)   Date Value   08/30/2021 >14.0 (H)   04/19/2021 >14.0 (H)   03/01/2021 >14.0 (H)     LDL Cholesterol Calculated (mg/dL)   Date Value   03/01/2021 43   06/22/2020 50         She still has some back pain at times. Had compression fracture in the spring.  Now on alendronate.  Uses cool stones to help cool the pain.  Capsaicin helps for pain but not burning.         Review of Systems         Objective    /69   Pulse 85   Temp 98.9  F (37.2  C)   Resp 16   Wt 64.5 kg (142 lb 3.2 oz)   SpO2 96%   BMI 28.72 kg/m    Body mass index is 28.72 kg/m .  Physical Exam   GENERAL: healthy, alert and no distress  NECK: no adenopathy, no asymmetry, masses, or scars and thyroid normal to palpation  RESP: lungs clear to auscultation - no rales, rhonchi or wheezes  CV: regular rate and rhythm, normal S1 S2, no S3 or S4, no murmur, click or rub, no peripheral edema and peripheral pulses strong  MS: no gross musculoskeletal defects noted, no edema, 2+PT and DP pulses

## 2021-08-30 NOTE — NURSING NOTE
Due to patient being non-English speaking/uses sign language, an  was used for this visit. Only for face-to-face interpretation by an external agency, date and length of interpretation can be found on the scanned worksheet.     name: Jeane  Agency: [employer]  Language: Hmong   Telephone number:   Type of interpretation: Telephone, spoken

## 2021-08-30 NOTE — PATIENT INSTRUCTIONS
Restart gabapentin 300mg twice day.    Increase Invokana to 300mg once a day.      Come back for follow up of diabetes based on A1c results. We may need to restart insulin.    09/01/21  ORDER: MAMMO  [facility]  Phone: [phone]  Fax: [phone]    Referral and demographics faxed to [phone]. They will contact pt to schedule.     Manda King    09/29/21-call [facility] today to check in on status of scheduling Mammo. They had contact with the pt/ pt's daughter in law who told them she would call back to schedule the appt.     Manda King

## 2021-09-06 ENCOUNTER — APPOINTMENT (OUTPATIENT)
Dept: LAB | Facility: CLINIC | Age: 76
End: 2021-09-06
Payer: COMMERCIAL

## 2021-09-06 PROCEDURE — 82274 ASSAY TEST FOR BLOOD FECAL: CPT | Performed by: FAMILY MEDICINE

## 2021-09-10 LAB — HEMOCCULT STL QL IA: NEGATIVE

## 2021-09-19 ENCOUNTER — HEALTH MAINTENANCE LETTER (OUTPATIENT)
Age: 76
End: 2021-09-19

## 2021-09-20 NOTE — RESULT ENCOUNTER NOTE
Please call patient with the following information and schedule her for a follow up in 2 weeks for diabetes:  Normal colon cancer screening.  Your diabetes is still very poorly controlled.  HgbA1c 14%.   Please schedule a visit so we can discuss better control.

## 2021-11-14 ENCOUNTER — HEALTH MAINTENANCE LETTER (OUTPATIENT)
Age: 76
End: 2021-11-14

## 2021-12-11 DIAGNOSIS — I25.110 CORONARY ARTERY DISEASE INVOLVING NATIVE CORONARY ARTERY OF NATIVE HEART WITH UNSTABLE ANGINA PECTORIS (H): ICD-10-CM

## 2021-12-14 RX ORDER — LISINOPRIL 2.5 MG/1
TABLET ORAL
Qty: 90 TABLET | Refills: 3 | Status: SHIPPED | OUTPATIENT
Start: 2021-12-14 | End: 2022-12-16

## 2022-03-06 ENCOUNTER — HEALTH MAINTENANCE LETTER (OUTPATIENT)
Age: 77
End: 2022-03-06

## 2022-05-01 ENCOUNTER — HEALTH MAINTENANCE LETTER (OUTPATIENT)
Age: 77
End: 2022-05-01

## 2022-05-16 ENCOUNTER — TRANSFERRED RECORDS (OUTPATIENT)
Dept: HEALTH INFORMATION MANAGEMENT | Facility: CLINIC | Age: 77
End: 2022-05-16
Payer: COMMERCIAL

## 2022-05-16 LAB — RETINOPATHY: NEGATIVE

## 2022-05-18 ENCOUNTER — MEDICAL CORRESPONDENCE (OUTPATIENT)
Dept: HEALTH INFORMATION MANAGEMENT | Facility: CLINIC | Age: 77
End: 2022-05-18
Payer: COMMERCIAL

## 2022-05-20 ENCOUNTER — MEDICAL CORRESPONDENCE (OUTPATIENT)
Dept: HEALTH INFORMATION MANAGEMENT | Facility: CLINIC | Age: 77
End: 2022-05-20
Payer: COMMERCIAL

## 2022-05-20 ENCOUNTER — DOCUMENTATION ONLY (OUTPATIENT)
Dept: FAMILY MEDICINE | Facility: CLINIC | Age: 77
End: 2022-05-20
Payer: COMMERCIAL

## 2022-05-23 NOTE — PROGRESS NOTES
To be completed in Nursing note:    Please reference list for forms that require a visit for completion.  Please remind patients that providers are given 3-5 business days to complete and return forms.      Form type: [employer] ~ INCONTINENCE SUPPLY ORDER PRESCRIPTION: , , ,   [employer] ~ GLOVES:     Date form received: 5/11/2022, 5/16/2022    Date form completed by Physician: 5/20/2022    How was form returned to patient (mailed, faxed, or at  for patient to ):    Fax to [phone]    Date form mailed/faxed/left at  for patient and sent to HIM for scanning:    Fax on 5/20/2022    Once form is left for patient, faxed, or mailed PCS will then close the documentation only encounter.

## 2022-06-09 DIAGNOSIS — E11.9 TYPE 2 DIABETES MELLITUS WITHOUT COMPLICATION, WITHOUT LONG-TERM CURRENT USE OF INSULIN (H): ICD-10-CM

## 2022-06-09 DIAGNOSIS — I25.119 CORONARY ARTERY DISEASE INVOLVING NATIVE HEART WITH ANGINA PECTORIS, UNSPECIFIED VESSEL OR LESION TYPE (H): ICD-10-CM

## 2022-06-09 DIAGNOSIS — E78.00 HYPERCHOLESTEREMIA: ICD-10-CM

## 2022-06-09 RX ORDER — ATORVASTATIN CALCIUM 80 MG/1
TABLET, FILM COATED ORAL
Qty: 90 TABLET | Refills: 4 | Status: SHIPPED | OUTPATIENT
Start: 2022-06-09 | End: 2023-06-14

## 2022-06-09 RX ORDER — METOPROLOL SUCCINATE 25 MG/1
TABLET, EXTENDED RELEASE ORAL
Qty: 90 TABLET | Refills: 4 | Status: SHIPPED | OUTPATIENT
Start: 2022-06-09 | End: 2023-06-14

## 2022-06-09 RX ORDER — METFORMIN HCL 500 MG
TABLET, EXTENDED RELEASE 24 HR ORAL
Qty: 360 TABLET | Refills: 3 | Status: SHIPPED | OUTPATIENT
Start: 2022-06-09 | End: 2023-06-14

## 2022-06-09 RX ORDER — ISOSORBIDE MONONITRATE 30 MG/1
TABLET, EXTENDED RELEASE ORAL
Qty: 90 TABLET | Refills: 4 | Status: SHIPPED | OUTPATIENT
Start: 2022-06-09 | End: 2023-06-14

## 2022-06-15 DIAGNOSIS — E11.9 TYPE 2 DIABETES MELLITUS WITHOUT COMPLICATION, WITHOUT LONG-TERM CURRENT USE OF INSULIN (H): Primary | ICD-10-CM

## 2022-06-17 ENCOUNTER — TELEPHONE (OUTPATIENT)
Dept: FAMILY MEDICINE | Facility: CLINIC | Age: 77
End: 2022-06-17

## 2022-06-17 NOTE — TELEPHONE ENCOUNTER
Copy Dr. Maxwell's message for documentation purpose    Message  Received: Yesterday   Schedule follow-up appointment, Call patient  Kayli Maxwell MD Sheng Yang, Jenise Aldana Mai,   This patient requested a refill of glucose strips - looking at her chart she has a history of uncontrolled diabetes and was last seen in August of 2021 by Dr. Cortez. Could you please call her to schedule an appointment to be seen?     Thank you!   Dr. Maxwell

## 2022-06-17 NOTE — TELEPHONE ENCOUNTER
Called and talked to the sister in law. She will ask the patient first and call the clinic back to schedule appointment.  ANGELIC Tai

## 2022-07-09 ENCOUNTER — TELEPHONE (OUTPATIENT)
Dept: FAMILY MEDICINE | Facility: CLINIC | Age: 77
End: 2022-07-09

## 2022-07-09 NOTE — TELEPHONE ENCOUNTER
[facility] Answering Service     Patient tested positive for COVID-19 (home test) on 7/7 with reported onset of symptoms on 7/8. Symptoms are mild without respiratory distress. She asked about medication management. Agree this is worth considering in her case due her risk factors for her current mild symptoms to progress into severe. Discussed calling an urgent care for evaluation of medication mangement such as Paxlovid vs waiting to Monday to set up a virtual appointment at [facility]. Noted risk/benefits of waiting and indications for urgent evaluation. Patient daughter is agreeable with plan.     Robert Fuchs MD PGY-3  [facility]

## 2022-07-11 ENCOUNTER — VIRTUAL VISIT (OUTPATIENT)
Dept: FAMILY MEDICINE | Facility: CLINIC | Age: 77
End: 2022-07-11
Payer: COMMERCIAL

## 2022-07-11 DIAGNOSIS — U07.1 INFECTION DUE TO 2019 NOVEL CORONAVIRUS: Primary | ICD-10-CM

## 2022-07-11 PROCEDURE — 99443 PR PHYSICIAN TELEPHONE EVALUATION 21-30 MIN: CPT | Mod: 95 | Performed by: STUDENT IN AN ORGANIZED HEALTH CARE EDUCATION/TRAINING PROGRAM

## 2022-07-11 NOTE — PROGRESS NOTES
Preceptor Attestation:   I discussed the patient with the resident. I talked to the patient on the phone. I have verified the content of the note, which accurately reflects my assessment of the patient and the plan of care.   Supervising Physician:  Ceferino Russo MD.

## 2022-07-11 NOTE — PROGRESS NOTES
Jenise is a 77 year old who is being evaluated via a billable telephone visit.      What phone number would you like to be contacted at?   How would you like to obtain your AVS? Mail a copy    Assessment & Plan     Infection due to 2019 novel coronavirus  Discussed with patient that she is a candidate for Paxlovid treatment for acute COVID infection in the outpatient setting. GFR from 1 year ago appears > 90   does not require dose adjustment.  Discussed the contraindications and medication reaction that could result patient understands risks with taking these medications.  Reviewed med list there are no contraindicated medications on her current med list.  Recommended that if her symptoms worsen, develops high-grade fevers, severe dehydration, lethargy and weakness to the point where she cannot tolerate ambulation presents to the emergency department immediately.  Also discussed this case with several other MDs in our clinic, this is not common practice for our clinic to prescribe IV fluids and monoclonal antibody infusions to home.  We typically would recommend presenting to the emergency department or going to an infusion center if the services are needed.  Okay  - nirmatrelvir and ritonavir (PAXLOVID) therapy pack; Take 3 tablets by mouth 2 times daily for 5 days      No follow-ups on file.    John Almodovar MD  [facility]   Jenise is a 77 year old accompanied by her daughter, presenting for the following health issues:  other (Covid trteatment still feeling fatigue and cough , dehydrated )    Patient Active Problem List   Diagnosis     Abnormal Pap smear of cervix     Cervical spondylosis     PTSD (post-traumatic stress disorder)     Hyperlipemia     Hypertension     Low back pain Chronic     DDD (degenerative disc disease), lumbar     Muscle Aches, Generalized (Myalgia)     Thyroid nodule     History of subtotal thyroidectomy     Vitamin D deficiency     Seasonal allergies      Noncompliance with medication regimen     Type 2 diabetes mellitus without complication, with long-term current use of insulin (H)     Coronary artery disease involving native coronary artery of native heart with unstable angina pectoris (H)     Anemia, iron deficiency     S/P CABG (coronary artery bypass graft)     Functional urinary incontinence     Compression fracture of L1 vertebra with routine healing         Current Outpatient Medications   Medication     ACETAMINOPHEN EXTRA STRENGTH 500 MG tablet     alendronate (FOSAMAX) 70 MG tablet     aspirin (ASA) 81 MG tablet     atorvastatin (LIPITOR) 80 MG tablet     azelastine (OPTIVAR) 0.05 % ophthalmic solution     blood glucose (NO BRAND SPECIFIED) lancets standard     blood glucose monitoring (CONTOUR NEXT MONITOR W/DEVICE KIT) meter device kit     canagliflozin (INVOKANA) 300 MG tablet     capsaicin (ZOSTRIX) 0.025 % external cream     cetirizine (ZYRTEC) 10 MG tablet     CONTOUR NEXT TEST test strip     cyclobenzaprine (FLEXERIL) 5 MG tablet     famotidine (PEPCID) 20 MG tablet     gabapentin (NEURONTIN) 300 MG capsule     insulin pen needle (BD ASTRID U/F) 32G X 4 MM miscellaneous     isosorbide mononitrate (IMDUR) 30 MG 24 hr tablet     lisinopril (ZESTRIL) 2.5 MG tablet     metFORMIN (GLUCOPHAGE XR) 500 MG 24 hr tablet     metoprolol succinate ER (TOPROL XL) 25 MG 24 hr tablet     Microlet Lancets MISC     nirmatrelvir and ritonavir (PAXLOVID) therapy pack     nitroGLYcerin (NITROSTAT) 0.4 MG sublingual tablet     Nutritional Supplements (GLUCERNA ADVANCE SHAKE) LIQD     No current facility-administered medications for this visit.         HPI     Patient presents today with respiratory symptoms, found to be positive for COVID last Friday 7/8.  She has been immunized and boosted for COVID.  She has high risk category given her age 77, plus history of diabetes, hypertension, coronary artery disease.  He continues to have a fatigue, cough, dehydration.  She is  not hypoxic she is able to walk around and perform her normal activities.    Her daughter is also on the phone and request that she get IV Fluids and an infusion of monoclonal antibodies as this was ordered by her provider from [facility].    Review of Systems   Constitutional, HEENT, cardiovascular, pulmonary, gi and gu systems are negative, except as otherwise noted.      Objective           Vitals:  No vitals were obtained today due to virtual visit.    Physical Exam   healthy, alert and no distress  PSYCH: Alert and oriented times 3; coherent speech, normal   rate and volume, able to articulate logical thoughts, able   to abstract reason, no tangential thoughts, no hallucinations   or delusions  Her affect is normal  RESP: No cough, no audible wheezing, able to talk in full sentences  Remainder of exam unable to be completed due to telephone visits      ----- Service Performed and Documented by Resident or Fellow ------        Phone call duration: 25 minutes    .  ..

## 2022-07-12 ENCOUNTER — TELEPHONE (OUTPATIENT)
Dept: FAMILY MEDICINE | Facility: CLINIC | Age: 77
End: 2022-07-12

## 2022-07-12 DIAGNOSIS — U07.1 INFECTION DUE TO 2019 NOVEL CORONAVIRUS: ICD-10-CM

## 2022-07-12 NOTE — TELEPHONE ENCOUNTER
[facility] phone call message- medication clarification/question:    Full Medication Name: nirmatrelvir and ritonavir (PAXLOVID) therapy pack    Question: The [facility] [place] is closed . Can the medication please be sent to the pharmacy below?     [facility]  [address] [place] [address]   Phone: [phone]  : Yes    OK to leave a message on voice mail? Yes    Primary language: Hmong      needed? Yes    Call taken on July 12, 2022 at 3:47 PM by Tony Simon

## 2022-07-24 ENCOUNTER — TELEPHONE (OUTPATIENT)
Dept: OPHTHALMOLOGY | Facility: CLINIC | Age: 77
End: 2022-07-24

## 2022-07-24 ENCOUNTER — HOSPITAL ENCOUNTER (EMERGENCY)
Facility: HOSPITAL | Age: 77
Discharge: ANOTHER HOSPITAL | End: 2022-07-24
Attending: EMERGENCY MEDICINE | Admitting: EMERGENCY MEDICINE
Payer: COMMERCIAL

## 2022-07-24 ENCOUNTER — APPOINTMENT (OUTPATIENT)
Dept: CT IMAGING | Facility: HOSPITAL | Age: 77
End: 2022-07-24
Attending: EMERGENCY MEDICINE
Payer: COMMERCIAL

## 2022-07-24 VITALS
DIASTOLIC BLOOD PRESSURE: 63 MMHG | RESPIRATION RATE: 20 BRPM | HEIGHT: 60 IN | OXYGEN SATURATION: 97 % | HEART RATE: 66 BPM | SYSTOLIC BLOOD PRESSURE: 129 MMHG | BODY MASS INDEX: 31.41 KG/M2 | TEMPERATURE: 98.8 F | WEIGHT: 160 LBS

## 2022-07-24 DIAGNOSIS — H40.211 ACUTE ANGLE-CLOSURE GLAUCOMA OF RIGHT EYE: ICD-10-CM

## 2022-07-24 DIAGNOSIS — R73.9 HYPERGLYCEMIA: ICD-10-CM

## 2022-07-24 LAB
ALBUMIN SERPL-MCNC: 3.3 G/DL (ref 3.5–5)
ALP SERPL-CCNC: 106 U/L (ref 45–120)
ALT SERPL W P-5'-P-CCNC: 25 U/L (ref 0–45)
ANION GAP SERPL CALCULATED.3IONS-SCNC: 10 MMOL/L (ref 5–18)
AST SERPL W P-5'-P-CCNC: 16 U/L (ref 0–40)
BASE EXCESS BLDV CALC-SCNC: 2.7 MMOL/L
BASOPHILS # BLD AUTO: 0 10E3/UL (ref 0–0.2)
BASOPHILS NFR BLD AUTO: 0 %
BILIRUB DIRECT SERPL-MCNC: 0.2 MG/DL
BILIRUB SERPL-MCNC: 0.5 MG/DL (ref 0–1)
BUN SERPL-MCNC: 16 MG/DL (ref 8–28)
C REACTIVE PROTEIN LHE: 0.2 MG/DL (ref 0–?)
CALCIUM SERPL-MCNC: 8.8 MG/DL (ref 8.5–10.5)
CHLORIDE BLD-SCNC: 97 MMOL/L (ref 98–107)
CO2 SERPL-SCNC: 24 MMOL/L (ref 22–31)
CREAT SERPL-MCNC: 1 MG/DL (ref 0.6–1.1)
EOSINOPHIL # BLD AUTO: 0 10E3/UL (ref 0–0.7)
EOSINOPHIL NFR BLD AUTO: 0 %
ERYTHROCYTE [DISTWIDTH] IN BLOOD BY AUTOMATED COUNT: 14.2 % (ref 10–15)
ERYTHROCYTE [SEDIMENTATION RATE] IN BLOOD BY WESTERGREN METHOD: 14 MM/HR (ref 0–20)
GFR SERPL CREATININE-BSD FRML MDRD: 58 ML/MIN/1.73M2
GLUCOSE BLD-MCNC: 597 MG/DL (ref 70–125)
GLUCOSE BLDC GLUCOMTR-MCNC: 186 MG/DL (ref 70–99)
GLUCOSE BLDC GLUCOMTR-MCNC: 208 MG/DL (ref 70–99)
GLUCOSE BLDC GLUCOMTR-MCNC: 284 MG/DL (ref 70–99)
GLUCOSE BLDC GLUCOMTR-MCNC: 421 MG/DL (ref 70–99)
GLUCOSE BLDC GLUCOMTR-MCNC: 450 MG/DL (ref 70–99)
GLUCOSE BLDC GLUCOMTR-MCNC: 499 MG/DL (ref 70–99)
GLUCOSE BLDC GLUCOMTR-MCNC: 550 MG/DL (ref 70–99)
HBA1C MFR BLD: >14 %
HCO3 BLDV-SCNC: 26 MMOL/L (ref 24–30)
HCT VFR BLD AUTO: 39.2 % (ref 35–47)
HGB BLD-MCNC: 12.3 G/DL (ref 11.7–15.7)
IMM GRANULOCYTES # BLD: 0 10E3/UL
IMM GRANULOCYTES NFR BLD: 0 %
KETONES BLD-SCNC: 0.47 MMOL/L
LACTATE SERPL-SCNC: 2.1 MMOL/L (ref 0.7–2)
LYMPHOCYTES # BLD AUTO: 1.1 10E3/UL (ref 0.8–5.3)
LYMPHOCYTES NFR BLD AUTO: 14 %
MAGNESIUM SERPL-MCNC: 2.3 MG/DL (ref 1.8–2.6)
MCH RBC QN AUTO: 21.4 PG (ref 26.5–33)
MCHC RBC AUTO-ENTMCNC: 31.4 G/DL (ref 31.5–36.5)
MCV RBC AUTO: 68 FL (ref 78–100)
MONOCYTES # BLD AUTO: 0.3 10E3/UL (ref 0–1.3)
MONOCYTES NFR BLD AUTO: 3 %
NEUTROPHILS # BLD AUTO: 6.5 10E3/UL (ref 1.6–8.3)
NEUTROPHILS NFR BLD AUTO: 83 %
NRBC # BLD AUTO: 0 10E3/UL
NRBC BLD AUTO-RTO: 0 /100
OXYHGB MFR BLDV: 84.1 % (ref 70–75)
PCO2 BLDV: 45 MM HG (ref 35–50)
PH BLDV: 7.4 [PH] (ref 7.35–7.45)
PHOSPHATE SERPL-MCNC: 4.9 MG/DL (ref 2.5–4.5)
PLATELET # BLD AUTO: 200 10E3/UL (ref 150–450)
PO2 BLDV: 48 MM HG (ref 25–47)
POTASSIUM BLD-SCNC: 4.4 MMOL/L (ref 3.5–5)
PROT SERPL-MCNC: 6.6 G/DL (ref 6–8)
RBC # BLD AUTO: 5.76 10E6/UL (ref 3.8–5.2)
SAO2 % BLDV: 85.8 % (ref 70–75)
SARS-COV-2 RNA RESP QL NAA+PROBE: NEGATIVE
SODIUM SERPL-SCNC: 131 MMOL/L (ref 136–145)
TROPONIN I SERPL-MCNC: 0.04 NG/ML (ref 0–0.29)
WBC # BLD AUTO: 7.9 10E3/UL (ref 4–11)

## 2022-07-24 PROCEDURE — 250N000013 HC RX MED GY IP 250 OP 250 PS 637: Performed by: EMERGENCY MEDICINE

## 2022-07-24 PROCEDURE — 86140 C-REACTIVE PROTEIN: CPT | Performed by: EMERGENCY MEDICINE

## 2022-07-24 PROCEDURE — 96360 HYDRATION IV INFUSION INIT: CPT

## 2022-07-24 PROCEDURE — 83036 HEMOGLOBIN GLYCOSYLATED A1C: CPT | Performed by: EMERGENCY MEDICINE

## 2022-07-24 PROCEDURE — 96361 HYDRATE IV INFUSION ADD-ON: CPT

## 2022-07-24 PROCEDURE — 99285 EMERGENCY DEPT VISIT HI MDM: CPT | Mod: 25

## 2022-07-24 PROCEDURE — G1010 CDSM STANSON: HCPCS

## 2022-07-24 PROCEDURE — 93005 ELECTROCARDIOGRAM TRACING: CPT | Performed by: EMERGENCY MEDICINE

## 2022-07-24 PROCEDURE — 258N000003 HC RX IP 258 OP 636: Performed by: EMERGENCY MEDICINE

## 2022-07-24 PROCEDURE — 82010 KETONE BODYS QUAN: CPT | Performed by: EMERGENCY MEDICINE

## 2022-07-24 PROCEDURE — 96372 THER/PROPH/DIAG INJ SC/IM: CPT | Performed by: EMERGENCY MEDICINE

## 2022-07-24 PROCEDURE — 87635 SARS-COV-2 COVID-19 AMP PRB: CPT | Performed by: EMERGENCY MEDICINE

## 2022-07-24 PROCEDURE — 84100 ASSAY OF PHOSPHORUS: CPT | Performed by: EMERGENCY MEDICINE

## 2022-07-24 PROCEDURE — 84484 ASSAY OF TROPONIN QUANT: CPT | Performed by: EMERGENCY MEDICINE

## 2022-07-24 PROCEDURE — 85025 COMPLETE CBC W/AUTO DIFF WBC: CPT | Performed by: EMERGENCY MEDICINE

## 2022-07-24 PROCEDURE — 36415 COLL VENOUS BLD VENIPUNCTURE: CPT | Performed by: EMERGENCY MEDICINE

## 2022-07-24 PROCEDURE — 85652 RBC SED RATE AUTOMATED: CPT | Performed by: EMERGENCY MEDICINE

## 2022-07-24 PROCEDURE — 83605 ASSAY OF LACTIC ACID: CPT | Performed by: EMERGENCY MEDICINE

## 2022-07-24 PROCEDURE — 250N000009 HC RX 250: Performed by: EMERGENCY MEDICINE

## 2022-07-24 PROCEDURE — 82248 BILIRUBIN DIRECT: CPT | Performed by: EMERGENCY MEDICINE

## 2022-07-24 PROCEDURE — 82805 BLOOD GASES W/O2 SATURATION: CPT | Performed by: EMERGENCY MEDICINE

## 2022-07-24 PROCEDURE — 83735 ASSAY OF MAGNESIUM: CPT | Performed by: EMERGENCY MEDICINE

## 2022-07-24 PROCEDURE — 250N000012 HC RX MED GY IP 250 OP 636 PS 637: Performed by: EMERGENCY MEDICINE

## 2022-07-24 PROCEDURE — 83930 ASSAY OF BLOOD OSMOLALITY: CPT | Performed by: EMERGENCY MEDICINE

## 2022-07-24 PROCEDURE — C9803 HOPD COVID-19 SPEC COLLECT: HCPCS

## 2022-07-24 RX ORDER — DORZOLAMIDE HYDROCHLORIDE AND TIMOLOL MALEATE 20; 5 MG/ML; MG/ML
1 SOLUTION/ DROPS OPHTHALMIC 2 TIMES DAILY
Status: DISCONTINUED | OUTPATIENT
Start: 2022-07-24 | End: 2022-07-25 | Stop reason: HOSPADM

## 2022-07-24 RX ORDER — LATANOPROST 50 UG/ML
1 SOLUTION/ DROPS OPHTHALMIC DAILY
Status: DISCONTINUED | OUTPATIENT
Start: 2022-07-24 | End: 2022-07-25 | Stop reason: HOSPADM

## 2022-07-24 RX ORDER — TETRACAINE HYDROCHLORIDE 5 MG/ML
1-2 SOLUTION OPHTHALMIC ONCE
Status: COMPLETED | OUTPATIENT
Start: 2022-07-24 | End: 2022-07-24

## 2022-07-24 RX ORDER — BRIMONIDINE TARTRATE 2 MG/ML
1 SOLUTION/ DROPS OPHTHALMIC EVERY 8 HOURS SCHEDULED
Status: DISCONTINUED | OUTPATIENT
Start: 2022-07-24 | End: 2022-07-25 | Stop reason: HOSPADM

## 2022-07-24 RX ORDER — TIMOLOL MALEATE 5 MG/ML
1 SOLUTION/ DROPS OPHTHALMIC 2 TIMES DAILY
Status: DISCONTINUED | OUTPATIENT
Start: 2022-07-24 | End: 2022-07-24

## 2022-07-24 RX ADMIN — INSULIN ASPART 10 UNITS: 100 INJECTION, SOLUTION INTRAVENOUS; SUBCUTANEOUS at 19:39

## 2022-07-24 RX ADMIN — DORZOLAMIDE HYDROCHLORIDE AND TIMOLOL MALEATE 1 DROP: 20; 5 SOLUTION/ DROPS OPHTHALMIC at 22:45

## 2022-07-24 RX ADMIN — LATANOPROST 1 DROP: 50 SOLUTION OPHTHALMIC at 22:45

## 2022-07-24 RX ADMIN — SODIUM CHLORIDE 1000 ML: 9 INJECTION, SOLUTION INTRAVENOUS at 17:38

## 2022-07-24 RX ADMIN — TETRACAINE HYDROCHLORIDE 2 DROP: 5 SOLUTION OPHTHALMIC at 21:33

## 2022-07-24 RX ADMIN — SODIUM CHLORIDE 500 ML: 9 INJECTION, SOLUTION INTRAVENOUS at 20:14

## 2022-07-24 RX ADMIN — BRIMONIDINE TARTRATE 1 DROP: 2 SOLUTION OPHTHALMIC at 22:45

## 2022-07-24 ASSESSMENT — ENCOUNTER SYMPTOMS
FEVER: 0
PHOTOPHOBIA: 1
CONFUSION: 0
EYE PAIN: 1
NAUSEA: 0
VOMITING: 0
HEADACHES: 1
BRUISES/BLEEDS EASILY: 0
COUGH: 0

## 2022-07-24 NOTE — ED TRIAGE NOTES
Patient developed a headache this morning. She checked her blood sugar and it was greater then 600. Takes metformin for diabetes. Has also had blurry vision for the past coupe of months.      Triage Assessment     Row Name 07/24/22 1368       Respiratory WDL    Respiratory WDL WDL       Cardiac WDL    Cardiac WDL WDL       Cognitive/Neuro/Behavioral WDL    Cognitive/Neuro/Behavioral WDL --  headache

## 2022-07-24 NOTE — ED PROVIDER NOTES
EMERGENCY DEPARTMENT ENCOUNTER      NAME: Jenise Paz  AGE: 77 year old female  YOB: 1945  MRN: [id number]  EVALUATION DATE & TIME: 2022  4:59 PM    PCP: Kayli Maxwell    ED PROVIDER: dJ Norwood MD        Chief Complaint   Patient presents with     Hyperglycemia         FINAL IMPRESSION:  1. Hyperglycemia    2. Acute angle-closure glaucoma of right eye          ED COURSE & MEDICAL DECISION MAKIN:33 PM I met with patient for initial interview and encounter. PPE worn includes N95 mask and face shield.  9:55 PM I spoke with Dr. Alcala from [facility] eye.  10:38 PM Dr. Messina [facility] accepts the patient.  10:41 PM I gave eye drops.  11:00 PM Second dose of eye drops.  11:17 PM I went to give the last dose of eyedrops.  Pertinent Labs & Imaging studies reviewed. (See chart for details)      77 year old female presents to the Emergency Department for evaluation of high blood sugar and right eye pain    Has not been able to take her medications as usual because of blurred vision.  Has had chronic bilateral blurred vision but recently her right eye is gotten worse and now she has a right-sided headache and some nausea    Sed rate and CRP are not suggestive of temporal arteritis.  CT head is negative.    Clinically concern for acute angle-closure glaucoma.  Intraocular pressure was reading high.    Despite 2 rounds of glaucoma drops pressures are still read in the 40s therefore plan for transfer to hospital that can take care of high blood sugar and glaucoma    Patient is not in DKA.  Likely medication noncompliance as a cause of her diabetic ketosis.  Given IV fluids and insulin and her sugars have almost normalized    Spoke with Dr. Messina [facility] who agrees with transfer        ED Course as of 22 2327   Sun  CRP: 0.2    Sugar is now 284   4 CRP: 0.2  Temporal arteritis unlikely    Sed Rate: 14  Temporal arteritis unlikely  "  2225 1 drop Cosopt, Xalatan, Alphagan given   2245 Second round of eyedrops given.  Patient's intraocular pressure is reading 48.  Clinically her headaches improving however and the redness is resolved.   2306 3rd Round of eyedrops given.   Paramedics arrived and transported patient to emergency [place] [facility].  Still has right-sided headache but clinically eyes less red   2325 Blood sugar 186   2326 GLUCOSE BY METER POCT(!): 186   2326 SARS CoV2 PCR: Negative   2326 Ketone Quantitative(!): 0.47   2326 Carbon Dioxide: 24   2326 PCO2 Venous: 45   2326 Ph Venous: 7.40   2326 Lactic Acid(!): 2.1  Given IV fluids.  Sepsis not suspected   2326 Hemoglobin A1C(!): >14.0       At the conclusion of the encounter I discussed the results of all of the tests and the disposition. The questions were answered. The patient or family acknowledged understanding and was agreeable with the care plan.       60 minutes of critical care time     MEDICATIONS GIVEN IN THE EMERGENCY:  Medications   dorzolamide-timolol (COSOPT) ophthalmic solution 1 drop (1 drop Right Eye Given 7/24/22 2245)   latanoprost (XALATAN) 0.005 % ophthalmic solution 1 drop (1 drop Right Eye Given 7/24/22 2245)   brimonidine (ALPHAGAN) 0.2 % ophthalmic solution 1 drop (1 drop Right Eye Given 7/24/22 2245)   0.9% sodium chloride BOLUS (0 mLs Intravenous Stopped 7/24/22 1939)   insulin aspart (NovoLOG) injection (RAPID ACTING) (10 Units Subcutaneous Given 7/24/22 1939)   tetracaine (PONTOCAINE) 0.5 % ophthalmic solution 1-2 drop (2 drops Right Eye Given 7/24/22 2133)   0.9% sodium chloride BOLUS (0 mLs Intravenous Stopped 7/24/22 2116)       NEW PRESCRIPTIONS STARTED AT TODAY'S ER VISIT  Discharge Medication List as of 7/24/2022 11:26 PM             =================================================================    HPI    Triage note  \"  Patient developed a headache this morning. She checked her blood sugar and it was greater then 600. Takes metformin for diabetes. " "Has also had blurry vision for the past coupe of months.      Triage Assessment     Row Name 07/24/22 1654       Respiratory WDL    Respiratory WDL WDL       Cardiac WDL    Cardiac WDL WDL       Cognitive/Neuro/Behavioral WDL    Cognitive/Neuro/Behavioral WDL --  headache               \"      Patient information was obtained from: Patient    Use of : N/A        Jenise Paz is a 77 year old female with a pertinent history of diabetes who presents to this ED via walk-in for evaluation of hyperglycemia.     Patient reports she recently had Covid and was taking paxilovid. Today she started having a right sided headache and proceeded to check her blood sugar and it was high. She is also experiencing bad vision recently and is scheduled to see an eye surgeon. Prior to today her last blood sugar test was last week. She is currently not on insulin and sometimes missed her nightly medications.  Since coming to the ED she has started feeling slightly better. She denies nausea, vomiting, wounds, or any other complaints.       REVIEW OF SYSTEMS   Review of Systems   Constitutional: Negative for fever.   HENT: Negative for drooling.    Eyes: Positive for photophobia, pain and visual disturbance.   Respiratory: Negative for cough.    Cardiovascular: Negative for chest pain.   Gastrointestinal: Negative for nausea and vomiting.   Skin: Negative for rash.   Neurological: Positive for headaches.   Hematological: Does not bruise/bleed easily.   Psychiatric/Behavioral: Negative for confusion.        PAST MEDICAL HISTORY:  Past Medical History:   Diagnosis Date     Diabetes (H)      Hypertension        PAST SURGICAL HISTORY:  Past Surgical History:   Procedure Laterality Date     CYST REMOVAL      on neck     OVARIAN CYST REMOVAL       ZZC LAP,CHOLECYSTECTOMY/EXPLORE N/A 4/25/2018    Procedure: CHOLECYSTECTOMY, LAPAROSCOPIC;  Surgeon: Devan De Leon MD;  [place] [facility]  Service: General           CURRENT " MEDICATIONS:    ACETAMINOPHEN EXTRA STRENGTH 500 MG tablet  alendronate (FOSAMAX) 70 MG tablet  aspirin (ASA) 81 MG tablet  atorvastatin (LIPITOR) 80 MG tablet  azelastine (OPTIVAR) 0.05 % ophthalmic solution  canagliflozin (INVOKANA) 300 MG tablet  cetirizine (ZYRTEC) 10 MG tablet  cyclobenzaprine (FLEXERIL) 5 MG tablet  famotidine (PEPCID) 20 MG tablet  gabapentin (NEURONTIN) 300 MG capsule  isosorbide mononitrate (IMDUR) 30 MG 24 hr tablet  lisinopril (ZESTRIL) 2.5 MG tablet  metFORMIN (GLUCOPHAGE XR) 500 MG 24 hr tablet  metoprolol succinate ER (TOPROL XL) 25 MG 24 hr tablet  nitroGLYcerin (NITROSTAT) 0.4 MG sublingual tablet  blood glucose (NO BRAND SPECIFIED) lancets standard  blood glucose monitoring (CONTOUR NEXT MONITOR W/DEVICE KIT) meter device kit  CONTOUR NEXT TEST test strip  Microlet Lancets MISC  Nutritional Supplements (GLUCERNA ADVANCE SHAKE) LIQD        ALLERGIES:  Allergies   Allergen Reactions     Nka [No Known Allergies]        FAMILY HISTORY:  Family History   Problem Relation Age of Onset     Diabetes No family hx of      Cancer No family hx of      Heart Disease No family hx of        SOCIAL HISTORY:   Social History     Socioeconomic History     Marital status:    Tobacco Use     Smoking status: Never Smoker     Smokeless tobacco: Never Used   Substance and Sexual Activity     Alcohol use: No     Drug use: No     Sexual activity: Never       VITALS:  /63   Pulse 66   Temp 98.8  F (37.1  C) (Temporal)   Resp 20   Ht 1.524 m (5')   Wt 72.6 kg (160 lb)   SpO2 97%   BMI 31.25 kg/m      PHYSICAL EXAM      Vitals: /63   Pulse 66   Temp 98.8  F (37.1  C) (Temporal)   Resp 20   Ht 1.524 m (5')   Wt 72.6 kg (160 lb)   SpO2 97%   BMI 31.25 kg/m    General: Appears in no acute distress, awake, alert, interactive.  Eyes: Right eye is injected, has a steamy cornea, is mid dilated and fixed.  Intraocular pressure 39, 49.  Decreased visual acuity on the right.   HENT:  Atraumatic.  Neck: Supple.  Respiratory/Chest: Respiration unlabored.  Abdomen: non distended  Musculoskeletal: Normal extremities. No edema or erythema.  Skin: Normal color. No rash or diaphoresis.  Neurologic: Face symmetric, moves all extremities spontaneously. Speech clear.  Psychiatric: Oriented to person, place, and time. Affect appropriate.       LAB:  All pertinent labs reviewed and interpreted.  Results for orders placed or performed during the hospital encounter of 07/24/22   Head CT w/o contrast    Impression    IMPRESSION:  1.  No acute intracranial process.   Glucose by meter   Result Value Ref Range    GLUCOSE BY METER POCT 550 (HH) 70 - 99 mg/dL   Result Value Ref Range    Hemoglobin A1C >14.0 (H) <=5.6 %   Basic metabolic panel   Result Value Ref Range    Sodium 131 (L) 136 - 145 mmol/L    Potassium 4.4 3.5 - 5.0 mmol/L    Chloride 97 (L) 98 - 107 mmol/L    Carbon Dioxide (CO2) 24 22 - 31 mmol/L    Anion Gap 10 5 - 18 mmol/L    Urea Nitrogen 16 8 - 28 mg/dL    Creatinine 1.00 0.60 - 1.10 mg/dL    Calcium 8.8 8.5 - 10.5 mg/dL    Glucose 597 (HH) 70 - 125 mg/dL    GFR Estimate 58 (L) >60 mL/min/1.73m2   Result Value Ref Range    Phosphorus 4.9 (H) 2.5 - 4.5 mg/dL   Result Value Ref Range    Magnesium 2.3 1.8 - 2.6 mg/dL   Hepatic panel   Result Value Ref Range    Bilirubin Total 0.5 0.0 - 1.0 mg/dL    Bilirubin Direct 0.2 <=0.5 mg/dL    Protein Total 6.6 6.0 - 8.0 g/dL    Albumin 3.3 (L) 3.5 - 5.0 g/dL    Alkaline Phosphatase 106 45 - 120 U/L    AST 16 0 - 40 U/L    ALT 25 0 - 45 U/L   Lactic acid whole blood   Result Value Ref Range    Lactic Acid 2.1 (H) 0.7 - 2.0 mmol/L   Result Value Ref Range    Troponin I 0.04 0.00 - 0.29 ng/mL   Blood gas venous   Result Value Ref Range    pH Venous 7.40 7.35 - 7.45    pCO2 Venous 45 35 - 50 mm Hg    pO2 Venous 48 (H) 25 - 47 mm Hg    Bicarbonate Venous 26 24 - 30 mmol/L    Base Excess/Deficit (+/-) 2.7   mmol/L    Oxyhemoglobin Venous 84.1 (H) 70.0 - 75.0 %     O2 Sat, Venous 85.8 (H) 70.0 - 75.0 %   Ketone Beta-Hydroxybutyrate Quantitative   Result Value Ref Range    Ketone (Beta-Hydroxybutyrate) Quantitative 0.47 (H) <=0.3 mmol/L   Asymptomatic COVID-19 Virus (Coronavirus) by PCR Nasopharyngeal    Specimen: Nasopharyngeal; Swab   Result Value Ref Range    SARS CoV2 PCR Negative Negative   CBC with platelets and differential   Result Value Ref Range    WBC Count 7.9 4.0 - 11.0 10e3/uL    RBC Count 5.76 (H) 3.80 - 5.20 10e6/uL    Hemoglobin 12.3 11.7 - 15.7 g/dL    Hematocrit 39.2 35.0 - 47.0 %    MCV 68 (L) 78 - 100 fL    MCH 21.4 (L) 26.5 - 33.0 pg    MCHC 31.4 (L) 31.5 - 36.5 g/dL    RDW 14.2 10.0 - 15.0 %    Platelet Count 200 150 - 450 10e3/uL    % Neutrophils 83 %    % Lymphocytes 14 %    % Monocytes 3 %    % Eosinophils 0 %    % Basophils 0 %    % Immature Granulocytes 0 %    NRBCs per 100 WBC 0 <1 /100    Absolute Neutrophils 6.5 1.6 - 8.3 10e3/uL    Absolute Lymphocytes 1.1 0.8 - 5.3 10e3/uL    Absolute Monocytes 0.3 0.0 - 1.3 10e3/uL    Absolute Eosinophils 0.0 0.0 - 0.7 10e3/uL    Absolute Basophils 0.0 0.0 - 0.2 10e3/uL    Absolute Immature Granulocytes 0.0 <=0.4 10e3/uL    Absolute NRBCs 0.0 10e3/uL   Glucose by meter   Result Value Ref Range    GLUCOSE BY METER POCT 499 (H) 70 - 99 mg/dL   Glucose by meter   Result Value Ref Range    GLUCOSE BY METER POCT 450 (H) 70 - 99 mg/dL   CRP inflammation   Result Value Ref Range    CRP 0.2 0.0 - <0.8 mg/dL   Erythrocyte sedimentation rate auto   Result Value Ref Range    Erythrocyte Sedimentation Rate 14 0 - 20 mm/hr   Glucose by meter   Result Value Ref Range    GLUCOSE BY METER POCT 421 (H) 70 - 99 mg/dL   Glucose by meter   Result Value Ref Range    GLUCOSE BY METER POCT 284 (H) 70 - 99 mg/dL   Glucose by meter   Result Value Ref Range    GLUCOSE BY METER POCT 208 (H) 70 - 99 mg/dL   Glucose by meter   Result Value Ref Range    GLUCOSE BY METER POCT 186 (H) 70 - 99 mg/dL       RADIOLOGY:  Reviewed all pertinent  imaging. Please see official radiology report.  Head CT w/o contrast   Final Result   IMPRESSION:   1.  No acute intracranial process.          EKG:    Performed at: 17:23    Impression: Possible Lateral infarct, age undetermined. Possible inferior infarct (cited on or before 11-APR-2016). Abnormal ECG.    Rate: 68 bpm  Rhythm: Sinus Rhythm  Axis: 69  ID Interval: 196ms   QRS Interval: 94ms  QTc Interval: 457ms  ST Changes: None  Comparison: ECG of 19-NOV-2019 21:41, no significant change was found.    I have independently reviewed and interpreted the EKG(s) documented above.    PROCEDURES:   PROCEDURE: Intraocular Pressure Measurement   DEVICE USED: Tonopen   INDICATIONS: Eye pain   PROCEDURE PROVIDER: Dr Jd Norwood   SITE: Eyes   MEDICATION: 0.5% Tetracaine ophthalmic drops were applied to right eye   NOTE: Administered 2 drops of above solution, rapid anesthesia achieved. Using standard technique, performed Tonopen exam, which showed intraocular pressure(s) of;  39 mmHg in Right eye, 45 right eye    Can't see 'E' in right eye   COMPLICATIONS: Patient tolerated procedure well, without complication           I, Nora Mccain, am serving as a scribe to document services personally performed by Toy Norwood MD based on my observation and the provider's statements to me. I, Dr. Toy Norwood, attest that Nora Mccain is acting in a scribe capacity, has observed my performance of the services and has documented them in accordance with my direction.    Toy Norwood MD  Emergency Medicine  [facility] EMERGENCY DEPARTMENT  [address] [place] [id number] [phone]     Toy Norwood MD  07/24/22 1103

## 2022-07-25 ENCOUNTER — HOSPITAL ENCOUNTER (EMERGENCY)
Facility: CLINIC | Age: 77
Discharge: HOME | End: 2022-07-25
Attending: EMERGENCY MEDICINE | Admitting: EMERGENCY MEDICINE
Payer: COMMERCIAL

## 2022-07-25 VITALS
TEMPERATURE: 99.4 F | SYSTOLIC BLOOD PRESSURE: 138 MMHG | OXYGEN SATURATION: 95 % | RESPIRATION RATE: 16 BRPM | DIASTOLIC BLOOD PRESSURE: 73 MMHG | HEART RATE: 60 BPM

## 2022-07-25 DIAGNOSIS — H40.211 ACUTE ANGLE-CLOSURE GLAUCOMA OF RIGHT EYE: ICD-10-CM

## 2022-07-25 LAB
ATRIAL RATE - MUSE: 68 BPM
DIASTOLIC BLOOD PRESSURE - MUSE: NORMAL MMHG
GLUCOSE BLDC GLUCOMTR-MCNC: 242 MG/DL (ref 70–99)
INTERPRETATION ECG - MUSE: NORMAL
OSMOLALITY SERPL: 311 MMOL/KG (ref 280–301)
P AXIS - MUSE: 29 DEGREES
PR INTERVAL - MUSE: 196 MS
QRS DURATION - MUSE: 94 MS
QT - MUSE: 430 MS
QTC - MUSE: 457 MS
R AXIS - MUSE: 69 DEGREES
SYSTOLIC BLOOD PRESSURE - MUSE: NORMAL MMHG
T AXIS - MUSE: 29 DEGREES
VENTRICULAR RATE- MUSE: 68 BPM

## 2022-07-25 PROCEDURE — 96374 THER/PROPH/DIAG INJ IV PUSH: CPT | Performed by: EMERGENCY MEDICINE

## 2022-07-25 PROCEDURE — 250N000013 HC RX MED GY IP 250 OP 250 PS 637: Performed by: EMERGENCY MEDICINE

## 2022-07-25 PROCEDURE — 99291 CRITICAL CARE FIRST HOUR: CPT | Performed by: EMERGENCY MEDICINE

## 2022-07-25 PROCEDURE — 99284 EMERGENCY DEPT VISIT MOD MDM: CPT | Mod: 25 | Performed by: EMERGENCY MEDICINE

## 2022-07-25 PROCEDURE — 250N000011 HC RX IP 250 OP 636: Performed by: STUDENT IN AN ORGANIZED HEALTH CARE EDUCATION/TRAINING PROGRAM

## 2022-07-25 RX ORDER — ACETAZOLAMIDE 500 MG/1
500 CAPSULE, EXTENDED RELEASE ORAL 2 TIMES DAILY
Qty: 28 CAPSULE | Refills: 0 | Status: SHIPPED | OUTPATIENT
Start: 2022-07-25 | End: 2022-07-25

## 2022-07-25 RX ORDER — BRIMONIDINE TARTRATE 2 MG/ML
1 SOLUTION/ DROPS OPHTHALMIC 3 TIMES DAILY
Status: DISCONTINUED | OUTPATIENT
Start: 2022-07-25 | End: 2022-07-25 | Stop reason: HOSPADM

## 2022-07-25 RX ORDER — PROPARACAINE HYDROCHLORIDE 5 MG/ML
1 SOLUTION/ DROPS OPHTHALMIC ONCE
Status: DISCONTINUED | OUTPATIENT
Start: 2022-07-25 | End: 2022-07-25 | Stop reason: HOSPADM

## 2022-07-25 RX ORDER — DORZOLAMIDE HYDROCHLORIDE AND TIMOLOL MALEATE 20; 5 MG/ML; MG/ML
1 SOLUTION/ DROPS OPHTHALMIC 2 TIMES DAILY
Status: DISCONTINUED | OUTPATIENT
Start: 2022-07-25 | End: 2022-07-25 | Stop reason: HOSPADM

## 2022-07-25 RX ORDER — ACETAZOLAMIDE 250 MG/1
250 TABLET ORAL 2 TIMES DAILY
Qty: 14 TABLET | Refills: 0 | Status: SHIPPED | OUTPATIENT
Start: 2022-07-25 | End: 2022-07-25 | Stop reason: ALTCHOICE

## 2022-07-25 RX ORDER — ACETAMINOPHEN 325 MG/1
650 TABLET ORAL EVERY 4 HOURS PRN
Status: DISCONTINUED | OUTPATIENT
Start: 2022-07-25 | End: 2022-07-25 | Stop reason: HOSPADM

## 2022-07-25 RX ORDER — LATANOPROST 50 UG/ML
1 SOLUTION/ DROPS OPHTHALMIC AT BEDTIME
Status: DISCONTINUED | OUTPATIENT
Start: 2022-07-25 | End: 2022-07-25 | Stop reason: HOSPADM

## 2022-07-25 RX ORDER — ACETAZOLAMIDE 500 MG/1
500 CAPSULE, EXTENDED RELEASE ORAL 2 TIMES DAILY
Qty: 28 CAPSULE | Refills: 0 | Status: SHIPPED | OUTPATIENT
Start: 2022-07-25 | End: 2023-03-21

## 2022-07-25 RX ORDER — ACETAZOLAMIDE 500 MG/5ML
500 INJECTION, POWDER, LYOPHILIZED, FOR SOLUTION INTRAVENOUS ONCE
Status: COMPLETED | OUTPATIENT
Start: 2022-07-25 | End: 2022-07-25

## 2022-07-25 RX ADMIN — ACETAMINOPHEN 650 MG: 325 TABLET, FILM COATED ORAL at 02:03

## 2022-07-25 RX ADMIN — BRIMONIDINE TARTRATE 1 DROP: 2 SOLUTION/ DROPS OPHTHALMIC at 08:29

## 2022-07-25 RX ADMIN — DORZOLAMIDE HYDROCHLORIDE AND TIMOLOL MALEATE 1 DROP: 20; 5 SOLUTION/ DROPS OPHTHALMIC at 08:31

## 2022-07-25 RX ADMIN — ACETAMINOPHEN 650 MG: 325 TABLET, FILM COATED ORAL at 08:56

## 2022-07-25 RX ADMIN — ACETAZOLAMIDE 500 MG: 500 INJECTION, POWDER, LYOPHILIZED, FOR SOLUTION INTRAVENOUS at 01:46

## 2022-07-25 ASSESSMENT — ENCOUNTER SYMPTOMS
PHOTOPHOBIA: 1
EYE PAIN: 1
HEADACHES: 1

## 2022-07-25 NOTE — ED NOTES
Writer called report to Katelyn at the [facility] ED. Transport also called, transport should arrive within an hour. Family and pt updated.

## 2022-07-25 NOTE — ED PROVIDER NOTES
[facility] EMERGENCY DEPARTMENT ([facility])       7/25/22  History     Chief Complaint   Patient presents with     Eye Injury     The history is provided by a relative and medical records. The history is limited by a language barrier.     Jenise Paz is a 77 year old female with a past medical history significant for diabetes who is transferred to the ED from [place] for hyperglycemia and glaucoma. Patient's daughter-in-law provided history due to language barrier. Today, patient has had a constant throbbing headache on the right side. Patient has been experiencing extremely blurry vision and pressure in her eyes, both worse in her right eye. She has also had high blood sugar today (600s). She is not on insulin but sometimes misses her medications.     Patient has had issues with her vision for the past couple of months. She is scheduled for a cataract surgery which has been delayed to 9/12. Patient does not have a history of prior issues with her vision. She wears reading glasses but has been unable to see even with glasses for the past couple of months. She has also been having intermittent headaches for the past couple of weeks, however, today's headache was more constant prompting the ED visit.     Patient was seen for this issue today at the [place] ED but was transferred to see opthalmology due to high intraocular pressure. She was given IV fluids and insulin and her sugar was 186 at discharge.     CT HEAD W/O CONTRAST 7/24 8:32 PM  IMPRESSION:  1.  No acute intracranial process.    Past Medical History  Past Medical History:   Diagnosis Date     Diabetes (H)      Hypertension      Past Surgical History:   Procedure Laterality Date     CYST REMOVAL      on neck     OVARIAN CYST REMOVAL       ZZC LAP,CHOLECYSTECTOMY/EXPLORE N/A 4/25/2018    Procedure: CHOLECYSTECTOMY, LAPAROSCOPIC;  Surgeon: Devan De Leon MD;  Location: [facility] OR;  Service: General     acetaZOLAMIDE (DIAMOX SEQUEL) 500 MG  12 hr capsule  ACETAMINOPHEN EXTRA STRENGTH 500 MG tablet  alendronate (FOSAMAX) 70 MG tablet  aspirin (ASA) 81 MG tablet  atorvastatin (LIPITOR) 80 MG tablet  azelastine (OPTIVAR) 0.05 % ophthalmic solution  blood glucose (NO BRAND SPECIFIED) lancets standard  blood glucose monitoring (CONTOUR NEXT MONITOR W/DEVICE KIT) meter device kit  canagliflozin (INVOKANA) 300 MG tablet  cetirizine (ZYRTEC) 10 MG tablet  CONTOUR NEXT TEST test strip  cyclobenzaprine (FLEXERIL) 5 MG tablet  famotidine (PEPCID) 20 MG tablet  gabapentin (NEURONTIN) 300 MG capsule  isosorbide mononitrate (IMDUR) 30 MG 24 hr tablet  lisinopril (ZESTRIL) 2.5 MG tablet  metFORMIN (GLUCOPHAGE XR) 500 MG 24 hr tablet  metoprolol succinate ER (TOPROL XL) 25 MG 24 hr tablet  Microlet Lancets MISC  nitroGLYcerin (NITROSTAT) 0.4 MG sublingual tablet  Nutritional Supplements (GLUCERNA ADVANCE SHAKE) LIQD      Allergies   Allergen Reactions     Nka [No Known Allergies]      Family History  Family History   Problem Relation Age of Onset     Diabetes No family hx of      Cancer No family hx of      Heart Disease No family hx of      Social History   Social History     Tobacco Use     Smoking status: Never Smoker     Smokeless tobacco: Never Used   Substance Use Topics     Alcohol use: No     Drug use: No      Past medical history, past surgical history, medications, allergies, family history, and social history were reviewed with the patient. No additional pertinent items.     I have reviewed the Medications, Allergies, Past Medical and Surgical History, and Social History in the Epic system.    Review of Systems   Constitutional: Negative for fever.   HENT: Negative for rhinorrhea.    Eyes: Positive for photophobia, pain and visual disturbance.   Respiratory: Negative for shortness of breath.    Cardiovascular: Negative for chest pain.   Gastrointestinal: Negative for abdominal pain, nausea and vomiting.   Endocrine: Negative for polyuria.   Genitourinary:  Negative for dysuria.   Musculoskeletal: Negative for back pain.   Skin: Negative for rash.   Allergic/Immunologic: Negative for immunocompromised state.   Neurological: Positive for headaches (throbbing, worse right). Negative for weakness.   Hematological: Does not bruise/bleed easily.   Psychiatric/Behavioral: Negative for confusion.     A complete review of systems was performed with pertinent positives and negatives noted in the HPI, and all other systems negative.    Physical Exam   BP: (!) 142/78  Pulse: 58  Temp: 99.4  F (37.4  C)  Resp: 16  SpO2: 96 %      Physical Exam  General: Afebrile, no acute distress   HEENT: Normocephalic, atraumatic, EOMI, right conjunctival mildly injected, increased IOP (right eye 55, 50; left eye 13) MMM  Neck: non-tender, supple  Cardio: regular rate. regular rhythm   Resp: Normal work of breathing, no respiratory distress, lungs clear bilaterally, no wheezing, rhonchi, rales  Chest/Back: no visual signs of trauma, no CVA tenderness   Abdomen: soft, non distension, no tenderness, no peritoneal signs   Neuro: alert and fully oriented. CN II-XII grossly intact. Grossly normal strength and sensation in all extremities.   MSK: no deformities. Normal range of motion  Integumentary/Skin: no rash visualized, normal color  Psych: normal affect, normal behavior    ED Course     12:50 AM  The patient was seen and examined by Naila Messina MD in Room ED01.     CRITICAL CARE: 30 minutes exclusive of procedures but including obtaining history, bedside examination, supervision of care, record review and collateral history from other parties, documentation, review/interpretation of imaging and lab results, discussion with patient, family, nursing, ancillary staff, consultants, and admitting service provider.   This patient presented with vision loss, elevated intraocular pressure, acute angle glaucoma requiring immediate bedside evaluation and intervention to prevent sudden, clinically  significant, or life threatening deterioration in the patient's condition.         Procedures      Results for orders placed or performed during the hospital encounter of 07/25/22 (from the past 24 hour(s))   Glucose by meter   Result Value Ref Range    GLUCOSE BY METER POCT 242 (H) 70 - 99 mg/dL     Medications   acetaZOLAMIDE (DIAMOX) injection 500 mg (500 mg Intravenous Given 7/25/22 0146)             Assessments & Plan (with Medical Decision Making)   Jenise Paz is a 77 year old female with a past medical history significant for diabetes who is transferred to the ED from [place] for  vision changes and concern for increased intraocular pressure and glaucoma. Upon arrival patient is well-appearing, afebrile, no distress. Patient resting comfortably, reports right headache, eye pain, and decreased vision over the past few weeks.  Ophthalmology bedside upon arrival.  Given concern for elevated intraocular pressure and acute angle glaucoma patient was given IV Diamox in the emergency department and will continue close monitoring.  Likely discharge to ophthalmology clinic in the morning for urgent cataract surgery.  Patient understands and agrees with the plan.  Ophthalmology will reevaluate the patient in the morning and confirm plan.    Patient signed out to morning provider pending ophthalmology reevaluation, recommendations, disposition.    I have reviewed the nursing notes.    I have reviewed the findings, diagnosis, plan and need for follow up with the patient.    Discharge Medication List as of 7/25/2022  8:50 AM      START taking these medications    Details   acetaZOLAMIDE (DIAMOX SEQUEL) 500 MG 12 hr capsule Take 1 capsule (500 mg) by mouth 2 times daily, Disp-28 capsule, R-0, E-Prescribe             Final diagnoses:   Acute angle-closure glaucoma of right eye       Maurice BESS am serving as a trained medical scribe to document services personally performed by Naila Messina MD, based on the  provider's statements to me.      I, Naila Messina MD, was physically present and have reviewed and verified the accuracy of this note documented by Maurice Castillo.       Naila Messina MD  7/24/2022   [facility] EMERGENCY DEPARTMENT     Naila Messina MD  07/26/22 0055

## 2022-07-25 NOTE — DISCHARGE INSTRUCTIONS
In the RIGHT EYE  Use Cosopt (blue top) 2 times a day  Latanorpsot (teal top) once at night  Brimonidine (purple top) 3 times a day    Take Diamox 500mg capsule 2 times a day    Dr. Celia Scott, a subspecialist glaucoma and cataract surgeon, will expedite your surgery       Follow-up with ophthalmology as scheduled per ophthalmology  Use the medications as prescribed

## 2022-07-25 NOTE — PROGRESS NOTES
Brief ophthalmology note    77yoF with probable right acute angle closure. According to [facility] ER provider, she came in for abnormal BG readings (>600s) and hypertension, but then mentioned the right eye pain and right headache to him. The ER physician measured IOP of 39 and rechecked and it was in the 30s. The conj/sclera was injected, the pupil was mid-dilated, and the cornea appeared hazy. He also mentioned the patient has visible cataracts (white).     He then called me and at the time, the ER provider's plan was to admit the patient for glycemic control as she was on the precipice of DKA and/or is in HHS (unclear), so the patient would not have been transferred to [facility]. Therefore, I told him to begin 3 rounds of Cosopt, brimonidine and latanoprost eye drops. He got the 3rd round in and he said even after the 2nd round, the patient's eye pain and headache improved. I then found out the ER physician transferred the patient as he said no hospitalist there would be want to take care of this patient with an acute eye problem.     Probable mechanism  - Patient had a cataractous lens and anatomic narrow angles. With BGs in the 600s, her lens swelled and probably put her into acute angle closure.     Recommendations  - Recheck IOP and verify if it actually above 21; ideally we should get IOP in both eyes to get a comparison.  - If IOP is still in 30s, pt should get IV Diamox 500mg once and then recheck IOP in 2-3 hours. Her Cr is 1 and she does not have chronic lung disease, so it should be safe.  - If IOP is in the 20s, Diamox won't be needed and the patient can remain in the ED for observation, pain control, BG control.  - Assuming there are no other needs, the patient could be discharged to [facility] ([address] [place] [address]) where she can get prophylactic laser peripheral iridotomy in the morning of 07/25/2022 when the clinic is open.   - If the patient discharges, please give her the 3 eye drops  (ordered for you).     Santy Alcala MD, MSc  Ophthalmology PGY-3 resident physician  Pager: [phone]

## 2022-07-25 NOTE — PHARMACY-ADMISSION MEDICATION HISTORY
Pharmacy Note - Admission Medication History    Pertinent Provider Information:      ______________________________________________________________________    Prior To Admission (PTA) med list completed and updated in EMR.       PTA Med List   Medication Sig Last Dose     ACETAMINOPHEN EXTRA STRENGTH 500 MG tablet TAKE 2 TABLETS BY MOUTH THREE TIMES DAILY AS NEEDED More than a month at Unknown time     alendronate (FOSAMAX) 70 MG tablet Take 1 tablet (70 mg) by mouth every 7 days Past Month at Unknown time     aspirin (ASA) 81 MG tablet Take 1 tablet daily 7/24/2022 at Unknown time     atorvastatin (LIPITOR) 80 MG tablet TAKE 1 TABLET(80 MG) BY MOUTH DAILY (Patient taking differently: Take 80 mg by mouth daily) 7/23/2022 at Unknown time     azelastine (OPTIVAR) 0.05 % ophthalmic solution PLACE 1 DROP INTO BOTH EYES TWICE DAILY (Patient taking differently: Place 1 drop into both eyes 2 times daily as needed Instill one drop into each affected eye.) More than a month at Unknown time     canagliflozin (INVOKANA) 300 MG tablet Take 1 tablet (300 mg) by mouth every morning (before breakfast) 7/24/2022 at Unknown time     cetirizine (ZYRTEC) 10 MG tablet Take 1 tablet (10 mg) by mouth daily (Patient taking differently: Take 10 mg by mouth daily as needed) Past Week at Unknown time     cyclobenzaprine (FLEXERIL) 5 MG tablet Take 1 tablet (5 mg) by mouth nightly as needed for muscle spasms or other (back pain) More than a month at Unknown time     famotidine (PEPCID) 20 MG tablet Take 1 tablet (20 mg) by mouth 2 times daily as needed (poor appetite) Past Month at Unknown time     gabapentin (NEURONTIN) 300 MG capsule Take 1 capsule (300 mg) by mouth 2 times daily 7/24/2022 at Unknown time     isosorbide mononitrate (IMDUR) 30 MG 24 hr tablet TAKE 1/2 TABLET( 15MG) BY MOUTH EVERY DAY (Patient taking differently: Take 15 mg by mouth daily) 7/24/2022 at Unknown time     lisinopril (ZESTRIL) 2.5 MG tablet TAKE 1 TABLET(2.5 MG)  BY MOUTH DAILY (Patient taking differently: Take 2.5 mg by mouth daily) 7/24/2022 at Unknown time     metFORMIN (GLUCOPHAGE XR) 500 MG 24 hr tablet TAKE 4 TABLETS BY MOUTH EVERY DAY WITH FOOD (Patient taking differently: Take 2,000 mg by mouth daily (with breakfast)) 7/24/2022 at Unknown time     metoprolol succinate ER (TOPROL XL) 25 MG 24 hr tablet TAKE 1 TABLET BY MOUTH EVERY DAY (Patient taking differently: Take 25 mg by mouth daily) 7/24/2022 at Unknown time     nitroGLYcerin (NITROSTAT) 0.4 MG sublingual tablet Place 1 tablet (0.4 mg) under the tongue every 5 minutes as needed for chest pain        Information source(s): Patient, Family member and CareEverywhere/[facility]  Method of interview communication: in-person    Summary of Changes to PTA Med List  New: none  Discontinued: capsaicin, paxlovid  Changed: cetirizine to PRN,     Patient was asked about OTC/herbal products specifically.  PTA med list reflects this.    In the past week, patient estimated taking medication this percent of the time:  greater than 90%.    Allergies were reviewed, assessed, and updated with the patient.      Patient did not bring any medications to the hospital and can't retrieve from home. No multi-dose medications are available for use during hospital stay.     The information provided in this note is only as accurate as the sources available at the time of the update(s).    Thank you for the opportunity to participate in the care of this patient.    Nadege Ramirez [facility]  7/24/2022 7:01 PM

## 2022-07-25 NOTE — CONSULTS
OPHTHALMOLOGY CONSULT NOTE  07/25/22    Patient: Jenise Paz  Consulted by: Outside ED  Reason for Consult: Right acute angle closure    ASSESSMENT/PLAN:     Jenise Paz is a 77 year old female who presents with     1. Acute angle closure glaucoma, right eye  2. 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 Anatomic narrow angles, both eyes   4. Hyperglycemic hyperosmolar syndrome   - Sx of right headache, eye pain and decreased vision have been ongoing for likely 2-3 weeks (since early 07/2022) per daughter-in-law and finally got medical attention 07/25/2022.   - Exam outside hospital with IOP 39 (but was 55 when I assessed her). After 2 rounds of Cosopt, brimonidine and latanoprost, Pt's pain and headache have improved but IOP was still 50.  - Started IV Diamox 500mg and used dynamic gonioscopy to aid in breaking angle closure to no avail. IOP was still 42 3.5 hours afterward. I gave pilocarpine one time.    - Clinical exam notable for non-reactive pupil, cornea diffuse MCEs preventing clear view of finer iris details. Gonio of angles was challenging. I suspect she does have PAS and angle closure.   - Etiology: I suspect her white cataract has progressively enlarged and with cycles of swelling when her BGs gets into 500s to above 600s led to acute angle closure. She is not in pupillary block and LPI would complicate definitive cataract surgery.   - Patient's vision in the right has been poor for at least 1 year due to cataract. For now, there is no APD so she has some visual potential. B-scan ruled out funnel RD and choriodal mass.    Recommendations  - IV Diamox 500mg once in ER  - Patient needs urgent cataract surgery with goniosynechialysis. Dr. Celia Scott has offered to expedite this for the patient. She can discharge on maximum medical therapy below and his office will arrange follow-up to plan surgery.    - Discharge on Diamox 500mg Sequel BID (ordered)  - Discharge on Cosopt BID,  latanoprost at bedtime, brimonidine TID (ordered)  - IV mannitol is contraindicated due to her poor glycemic control and unclear heart failure status as she probably has some ischemic CM from CAD requiring CABG. Her last EF was in 08/2016.   - LPI would not help open her angles in this situation and would cause more inflammation, complicating the cataract sugery and post-op recovery.       6. NS brunescence, left eye  - Will address in future    7. DM, unclear DR status  - Will address in future    Discussed plan with Dr Scott    My privilege to be part of your care,  Santy Alcala MD, MSc  Ophthalmology PGY-3 resident physician  Pager: [phone]    HISTORY OF PRESENTING ILLNESS:     Jenise Paz is a 77 year old female PMHx IDDM2, poorly controlled, CAD s/p CABG, s/p thyroid nodule/surgery that led to her mistrust of medical system and cataracts who has progressively had worsening vision in the right eye for several years then for the last 2-3 weeks, she has had intermittent right sided headaches with progressive blurring of her vision right > left. Her BGs at home have been out of control and she notices blurred vision with high BG.   Daughter tried to get patient evaluated last week and finally the pain was bad enough patient went to [facility] ED 07/24/2022.   She was transferred to [facility] for further cares.     OCULAR/MEDICAL/SURGICAL HISTORIES:     Past Ocular History:  Notable established diagnoses: Cataract right eye    Denies diabetic eye disease/retinopathy   Last eye exam: 06/2022 by optometrist  Prior eye surgery/laser: None   Was not offered laser procedures ever  Contact lens wear:None  Glasses: Yes, but doesn't war  Eyedrops: None    Family History:  - Glaucoma: unknown, children do not have it    Social History:  - [place]rated to [place] in 1980[place]  - Daughter-in law takes care of patient and knows medical history well; son helps take care of patient too and patient lives with them    Past Medical History:    Diagnosis Date    Diabetes (H)     Hypertension        Past Surgical History:   Procedure Laterality Date    CYST REMOVAL      on neck    OVARIAN CYST REMOVAL      ZZC LAP,CHOLECYSTECTOMY/EXPLORE N/A 4/25/2018    Procedure: CHOLECYSTECTOMY, LAPAROSCOPIC;  Surgeon: Devan De Leon MD;  Location: [facility];  Service: General       EXAMINATION:     Base Eye Exam       Visual Acuity         Right Left    Near sc CF/HM 20/400              Tonometry (Applanation, 12:43 AM)         Right Left    Pressure 55 13              Tonometry #2 (Tonopen, 1:40 AM)         Right Left    Pressure 50               Tonometry #3 (Tonopen, 7:27 AM)         Right Left    Pressure 42               Gonioscopy         Right Left    Temporal Poor view of angles, closed, plateau config iris Narrow    Nasal Poor view of angles, closed, plateau config iris Narrow    Superior Poor view of angles, closed, plateau config iris Narrow    Inferior Poor view of angles, closed, plateau config iris Deepest but narrow ~10-20 degrees              Pupils         Dark Light Shape React APD    Right 4 4 Round 0/4 no APD by reverse    Left 2.5 1.5 Round 3/4 no APD              Extraocular Movement         Right Left     Full Full              Dilation    Contraindicated both eyes                 Slit Lamp and Fundus Exam       Slit Lamp Exam         Right Left    Lids/Lashes Normal Normal    Conjunctiva/Sclera tr injection, inferior chemosis, ping White and quiet, ping T    Cornea One bulla developing ST; Diffuse MCEs Clear    Anterior Chamber Shallow including central Shallow by VH, quiet    Iris Plateau configration, mid-dilated, cannot r/o NVI Round and reactive, no florid NVI    Lens White cataract NS with brunescence              Fundus Exam         Right Left    Disc No view d/t cataract - B-scan shows no funnel RD, no large tears, no vitreous debris, no masses No view d/t dilation contraindication - B-scan shows no funnel RD, no large tears,  no vitreous debris, no masses                    Labs/Studies/Imaging Performed:  A1c 07/25/2022 > 14  BGs 07/25/2022 >600    GFR 58    Case discussed with resident and I agree with above.  Celia Scott MD

## 2022-07-25 NOTE — TELEPHONE ENCOUNTER
77yF IDDM2 with A1c >14, went into [facility] ED today for high BP and BG (>600s) at home. Then told ED provider about right sided headache. He assessed her and was confident she is likely in angle closure with injected conj, cloudy cornea and mid-dilated, minimally reactive pupils. As he plans to admit her for systemic stability, I suggested ways he could lower the IOP from 39 starting with 3 round topical Cosopt, brimonidine and latanoprost and then recheck IOP. Should this not be sufficient, I mentioned considering IV Diamox as long as she has no major contraindications.     The ED provider and I remained in touch directly through cell phone this evening.     Santy Alcala MD, MSc  Ophthalmology PGY-3 resident physician  Pager: [phone]

## 2022-07-26 ASSESSMENT — ENCOUNTER SYMPTOMS
WEAKNESS: 0
ABDOMINAL PAIN: 0
CONFUSION: 0
VOMITING: 0
SHORTNESS OF BREATH: 0
BRUISES/BLEEDS EASILY: 0
FEVER: 0
DYSURIA: 0
RHINORRHEA: 0
NAUSEA: 0
BACK PAIN: 0

## 2022-07-27 ENCOUNTER — OFFICE VISIT (OUTPATIENT)
Dept: OPHTHALMOLOGY | Facility: CLINIC | Age: 77
End: 2022-07-27
Attending: OPHTHALMOLOGY
Payer: COMMERCIAL

## 2022-07-27 DIAGNOSIS — H40.51X4 PHACOMORPHIC GLAUCOMA OF RIGHT EYE, INDETERMINATE STAGE: ICD-10-CM

## 2022-07-27 DIAGNOSIS — H40.211 ACUTE ANGLE-CLOSURE GLAUCOMA OF RIGHT EYE: Primary | ICD-10-CM

## 2022-07-27 DIAGNOSIS — H26.9 PHACOMORPHIC GLAUCOMA OF RIGHT EYE, INDETERMINATE STAGE: ICD-10-CM

## 2022-07-27 PROCEDURE — G0463 HOSPITAL OUTPT CLINIC VISIT: HCPCS

## 2022-07-27 PROCEDURE — 99207 PR SERVICE NOT STAFFED W/SUPERV PROV: CPT | Mod: GC | Performed by: OPHTHALMOLOGY

## 2022-07-27 ASSESSMENT — CONF VISUAL FIELD
OD_INFERIOR_TEMPORAL_RESTRICTION: 1
OD_SUPERIOR_TEMPORAL_RESTRICTION: 1
OS_INFERIOR_NASAL_RESTRICTION: 3
OS_SUPERIOR_NASAL_RESTRICTION: 1
OS_SUPERIOR_TEMPORAL_RESTRICTION: 3
OS_INFERIOR_TEMPORAL_RESTRICTION: 3
OD_INFERIOR_NASAL_RESTRICTION: 1
OD_SUPERIOR_NASAL_RESTRICTION: 1

## 2022-07-27 ASSESSMENT — TONOMETRY
OD_IOP_MMHG: 20
OS_IOP_MMHG: 10
IOP_METHOD: TONOPEN
OD_IOP_MMHG: 20
IOP_METHOD: TONOPEN
OD_IOP_MMHG: 22
IOP_METHOD: APPLANATION
OS_IOP_MMHG: 10

## 2022-07-27 ASSESSMENT — VISUAL ACUITY
METHOD: SNELLEN - LINEAR
OD_SC: LP
OS_SC: 3/200 E

## 2022-07-27 ASSESSMENT — SLIT LAMP EXAM - LIDS
COMMENTS: NORMAL
COMMENTS: NORMAL

## 2022-07-27 NOTE — NURSING NOTE
Chief Complaints and History of Present Illnesses   Patient presents with     Angle Closure Glaucoma Evaluation     Chief Complaint(s) and History of Present Illness(es)     Angle Closure Glaucoma Evaluation               Comments     Follow up from ED for Narrow angle glaucoma.  Pt with her Daughter today.  Pt states vision is worse today, pt very light sensitive. Pt having headache like pain around both eyes and into her forehead.  No new flashes or floaters, pt has been seeing floaters on and off for quite some time.  Pt currently taking Dorz/Timolol TID BE, Latanoprost at bedtime BE, Brimonodine TID BE, Diamox BID.    DM2 BS: Unknown to pt. Daughter reports that blood sugars were very high at ED visit 2 days ago.  Lab Results       Component                Value               Date                       A1C                      >14.0               07/24/2022                 A1C                      >14.0               08/30/2021                 A1C                      >14.0               04/19/2021                 A1C                      >14.0               03/01/2021                 A1C                      9.7                 06/22/2020                 A1C                      10.2                02/03/2020                 A1C                      14.7                12/02/2019              BETO Paula July 27, 2022 12:19 PM                           Patient was seen for dysphagia therapy. Temperature was taken prior to the session. Temperature was [facility]. COVID-19 screening questionnaire was negative. Mask and goggles were worn by SLP. Patient wore a mask. The following was targeted:    Ninfa Jordan was seen for dysphagia therapy today. Deep pharyngeal neuromuscular stimulation treatment was provided. Patient completed 75 exercises during today's session. Overall strength, endurance, and range of motion was rated as fair. Responses to each exercise will be indicated below.  Bilateral soft palate glide technique resulted in palatal reflex triggering and velopharyngeal closure. Responses were noted 100% of the time.  Triple soft palate glide technique resulted in palatal reflex triggering and velopharyngeal closure. Responses were noted 100% of the time.  Lingual glide technique resulted in laryngeal elevation reflex, tongue base retraction and reflexive lingual groove approximately 33% of the time.  Lateral lingual stimulation resulted in laryngeal elevation reflex, tongue base retraction, and reflexive lingual groove. Responses noted 33% of the time.  Lingual septum stimulation resulted in laryngeal elevation reflex and tongue base retraction. Lingual groove was noted. Response was noted 33% of the time.  Bilateral posterior pharyngeal constrictor stimulation resulted in tongue base retraction, palatal reflex and velopharyngeal closure noted approximately 100% of the time.  Tongue base retraction stimulation resulted in laryngeal elevation reflex, tongue base retraction and reflexive lingual groove approximately 33% of the time.  Uvula stimulation resulted in palatal trigger reflex and velopharyngeal closure. Responses were noted 100% of the time.  The nasal spine stimulation technique resulted in palatal trigger reflex and velopharyngeal closure. Responses were noted 100% of the time.    Lastly, the lateral pharyngeal stimulation technique demonstrated palatal trigger reflex and velopharyngeal closure. Responses were noted 100% of the time. Spontaneous swallow was noted greater than 33% of the time following each probe. Nasal sinus drainage was not present. Increased saliva production was noted. Tongue base retraction exercises were completed. Patient demonstrated fair strength, endurance, and range of motion. Continue plan of care. Treatment plan and goals can be found in the initial evaluation/progress report. Patti Jade M.S., JOSEPH-SLP/L  Speech-Language Pathologist    CPT CODE:       34633  dysphagia tx

## 2022-07-27 NOTE — PROGRESS NOTES
OPHTHALMOLOGY Progress Note  07/27/2022    Patient: Jenise Paz  Consulted by: Outside ED  Reason for Consult: Right acute angle closure    ASSESSMENT/PLAN:     Jenise Paz is a 77 year old female who presents with     1. Acute angle closure with plateau configuration, right eye  2. 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 Anatomic narrow angles, both eyes   4. Hyperglycemic hyperosmolar syndrome   - Sx of right headache, eye pain and decreased vision have been ongoing for likely 2-3 weeks (since early 07/2022) per daughter-in-law and finally got medical attention 07/25/2022.   - Exam outside hospital with IOP 39 (but was 55 when I assessed her). After 2 rounds of Cosopt, brimonidine and latanoprost, Pt's pain and headache have improved but IOP was still 50.  - Started IV Diamox 500mg and used dynamic gonioscopy to aid in breaking angle closure to no avail. IOP was still 42 3.5 hours afterward. I gave pilocarpine one time.    - Clinical exam notable for plateau configration, non-reactive pupil, cornea diffuse MCEs preventing clear view of finer iris details. Gonio of angles was challenging. 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   - Etiology: I suspect her white cataract has progressively enlarged and with cycles of swelling when her BGs gets into 500s to above 600s led to acute angle closure. She is not in pupillary block and LPI would complicate definitive cataract surgery. 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  - Patient's vision in the right has been poor for at least 1 year due to cataract. For now, there is no APD so she has some visual potential. B-scan ruled out funnel RD and choriodal mass.  - Treatment alternatives: IV mannitol is contraindicated due to her poor glycemic control and unclear heart failure status as she probably has some ischemic CM from CAD requiring CABG. Her last EF was in 08/2016. LPI would not help open her angles  in this situation and would cause more inflammation, complicating the cataract sugery and post-op recovery.       - 07/27/2022: IOP down to 22 by applanation; cornea clearer, no NVI    Recommendations  - Patient needs urgent cataract surgery with goniosynechialysis. Dr. Celia Scott has offered to expedite this for the patient. She can discharge on maximum medical therapy below and his office will arrange follow-up to plan surgery.    - Continue Diamox 500mg Sequel BID, and eye drops in right eye only: Cosopt BID, latanoprost at bedtime, brimonidine TID (ordered)  - Evaluate glaucoma after CE/IOL, given no APD at least it's not end-stage     5. NS brunescence, left eye  - Will address in future depending on status of right eye and ambulatory vision but right now she is legally blind     6. DM, unclear DR status  - Needs CE/IOL to monitor status of retinas     My privilege to be part of your care,  Santy Alcala MD, MSc  Ophthalmology PGY-3 resident physician  Pager: [phone]    HISTORY OF PRESENTING ILLNESS:     Jenise Paz is a 77 year old female PMHx IDDM2, poorly controlled, CAD s/p CABG, s/p thyroid nodule/surgery that led to her mistrust of medical system and cataracts who has progressively had worsening vision in the right eye for several years then for the last 2-3 weeks, she has had intermittent right sided headaches with progressive blurring of her vision right > left. Her BGs at home have been out of control and she notices blurred vision with high BG.   Daughter tried to get patient evaluated last week and finally the pain was bad enough patient went to [facility] ED 07/24/2022.   She was transferred to [facility] for further cares.     OCULAR/MEDICAL/SURGICAL HISTORIES:     Past Ocular History:  Notable established diagnoses: Cataract right eye    Denies diabetic eye disease/retinopathy   Last eye exam: 06/2022 by optometrist  Prior eye surgery/laser: None   Was not offered laser procedures ever  Contact lens  wear:None  Glasses: Yes, but doesn't war  Eyedrops: None    Family History:  - Glaucoma: unknown, children do not have it    Social History:  - Immigrated to [place] in 1980s  - Daughter-in law takes care of patient and knows medical history well; son helps take care of patient too and patient lives with them    Past Medical History:   Diagnosis Date     Diabetes (H)      Hypertension        Past Surgical History:   Procedure Laterality Date     CYST REMOVAL      on neck     OVARIAN CYST REMOVAL       ZZC LAP,CHOLECYSTECTOMY/EXPLORE N/A 4/25/2018    Procedure: CHOLECYSTECTOMY, LAPAROSCOPIC;  Surgeon: Devan De Leon MD;  Location: [facility];  Service: General       EXAMINATION:     Base Eye Exam     Visual Acuity (Snellen - Linear)       Right Left    Dist sc LP 3/200 E    Dist ph sc  NI          Tonometry (Tonopen, 12:31 PM)       Right Left    Pressure 20 10          Tonometry #2 (Tonopen, 12:31 PM)       Right Left    Pressure 20           Tonometry #3 (Applanation, 12:53 PM)       Right Left    Pressure 22 10          Pupils       Dark React    Right 5 Minimal    Left 7 Minimal          Visual Fields       Left Right    Restrictions Total superior nasal deficiency; Partial outer superior temporal, inferior temporal, inferior nasal deficiencies Total superior temporal, inferior temporal, superior nasal, inferior nasal deficiencies          Extraocular Movement       Right Left     Full, Ortho Full, Ortho          Neuro/Psych     Oriented x3: Yes    Mood/Affect: Normal            Slit Lamp and Fundus Exam     Slit Lamp Exam       Right Left    Lids/Lashes Normal Normal    Conjunctiva/Sclera tr injection, inferior chemosis, ping White and quiet, ping T    Cornea Clearer, no bullae rupture SN; decreased MCE; endo pigment Clear    Anterior Chamber Very shallow including central Very shallow by VH, quiet    Iris Plateau configration but not bombe, mid-dilated and less reactive, no NVI with clearer view Round and  reactive, no florid NVI    Lens White cataract NS with brunescence          Fundus Exam       Right Left    Disc No view d/t cataract - B-scan 07/25 shows no funnel RD, no large tears, no vitreous debris, no masses No view d/t cataract + contraindicated for dilation - B-scan 07/25 shows no funnel RD, no large tears, no vitreous debris, no masses                Labs/Studies/Imaging Performed:  A1c 07/25/2022 > 14  BGs 07/25/2022 >600    GFR 58

## 2022-08-12 ENCOUNTER — OFFICE VISIT (OUTPATIENT)
Dept: FAMILY MEDICINE | Facility: CLINIC | Age: 77
End: 2022-08-12
Payer: COMMERCIAL

## 2022-08-12 VITALS
RESPIRATION RATE: 20 BRPM | OXYGEN SATURATION: 95 % | DIASTOLIC BLOOD PRESSURE: 89 MMHG | TEMPERATURE: 98.5 F | WEIGHT: 136.4 LBS | SYSTOLIC BLOOD PRESSURE: 93 MMHG | BODY MASS INDEX: 26.64 KG/M2 | HEART RATE: 79 BPM

## 2022-08-12 DIAGNOSIS — E46 PROTEIN-CALORIE MALNUTRITION, UNSPECIFIED SEVERITY (H): ICD-10-CM

## 2022-08-12 DIAGNOSIS — I25.119 CORONARY ARTERY DISEASE INVOLVING NATIVE HEART WITH ANGINA PECTORIS, UNSPECIFIED VESSEL OR LESION TYPE (H): ICD-10-CM

## 2022-08-12 DIAGNOSIS — Z01.818 PREOP GENERAL PHYSICAL EXAM: Primary | ICD-10-CM

## 2022-08-12 DIAGNOSIS — E11.65 TYPE 2 DIABETES MELLITUS WITH HYPERGLYCEMIA, WITHOUT LONG-TERM CURRENT USE OF INSULIN (H): ICD-10-CM

## 2022-08-12 LAB
ANION GAP SERPL CALCULATED.3IONS-SCNC: 11 MMOL/L (ref 7–15)
BUN SERPL-MCNC: 11.2 MG/DL (ref 8–23)
CALCIUM SERPL-MCNC: 8.8 MG/DL (ref 8.8–10.2)
CHLORIDE SERPL-SCNC: 108 MMOL/L (ref 98–107)
CREAT SERPL-MCNC: 0.59 MG/DL (ref 0.51–0.95)
DEPRECATED HCO3 PLAS-SCNC: 21 MMOL/L (ref 22–29)
GFR SERPL CREATININE-BSD FRML MDRD: >90 ML/MIN/1.73M2
GLUCOSE SERPL-MCNC: 168 MG/DL (ref 70–99)
POTASSIUM SERPL-SCNC: 4.3 MMOL/L (ref 3.4–5.3)
SODIUM SERPL-SCNC: 140 MMOL/L (ref 136–145)

## 2022-08-12 PROCEDURE — 36415 COLL VENOUS BLD VENIPUNCTURE: CPT

## 2022-08-12 PROCEDURE — 80048 BASIC METABOLIC PNL TOTAL CA: CPT

## 2022-08-12 PROCEDURE — 99215 OFFICE O/P EST HI 40 MIN: CPT | Mod: GC

## 2022-08-12 ASSESSMENT — ACTIVITIES OF DAILY LIVING (ADL)
TRANSFERRING: 1-->ASSISTANCE (EQUIPMENT/PERSON) NEEDED (NOT DEVELOPMENTALLY APPROPRIATE)
DIFFICULTY_EATING/SWALLOWING: NO
EQUIPMENT_CURRENTLY_USED_AT_HOME: CANE, STRAIGHT
WALKING_OR_CLIMBING_STAIRS_DIFFICULTY: YES
HEARING_DIFFICULTY_OR_DEAF: NO
DIFFICULTY_COMMUNICATING: NO
TRANSFERRING: 1-->ASSISTANCE (EQUIPMENT/PERSON) NEEDED
CONCENTRATING,_REMEMBERING_OR_MAKING_DECISIONS_DIFFICULTY: NO
CHANGE_IN_FUNCTIONAL_STATUS_SINCE_ONSET_OF_CURRENT_ILLNESS/INJURY: NO
FALL_HISTORY_WITHIN_LAST_SIX_MONTHS: NO
TOILETING_ISSUES: NO
DOING_ERRANDS_INDEPENDENTLY_DIFFICULTY: YES
WEAR_GLASSES_OR_BLIND: YES
DRESSING/BATHING_DIFFICULTY: NO

## 2022-08-12 NOTE — PATIENT INSTRUCTIONS
Preparing for Your Surgery  Getting started  A nurse will call you to review your health history and instructions. They will give you an arrival time based on your scheduled surgery time. Please be ready to share:    Your doctor's clinic name and phone number    Your medical, surgical and anesthesia history    A list of allergies and sensitivities    A list of medicines, including herbal treatments and over-the-counter drugs    Whether the patient has a legal guardian (ask how to send us the papers in advance)  Please tell us if you're pregnant--or if there's any chance you might be pregnant. Some surgeries may injure a fetus (unborn baby), so they require a pregnancy test. Surgeries that are safe for a fetus don't always need a test, and you can choose whether to have one.   If you have a child who's having surgery, please ask for a copy of Preparing for Your Child's Surgery.    Preparing for surgery    Within 30 days of surgery: Have a pre-op exam (sometimes called an H&P, or History and Physical). This can be done at a clinic or pre-operative center.  ? If you're having a , you may not need this exam. Talk to your care team.    At your pre-op exam, talk to your care team about all medicines you take. If you need to stop any medicines before surgery, ask when to start taking them again.  ? We do this for your safety. Many medicines can make you bleed too much during surgery. Some change how well surgery (anesthesia) drugs work.    Call your insurance company to let them know you're having surgery. (If you don't have insurance, call [phone].)    Call your clinic if there's any change in your health. This includes signs of a cold or flu (sore throat, runny nose, cough, rash, fever). It also includes a scrape or scratch near the surgery site.    If you have questions on the day of surgery, call your hospital or surgery center.  COVID testing  You may need to be tested for COVID-19 before having  surgery. If so, we will give you instructions.  Eating and drinking guidelines  For your safety: Unless your surgeon tells you otherwise, follow the guidelines below.    Eat and drink as usual until 8 hours before surgery. After that, no food or milk.    Drink clear liquids until 2 hours before surgery. These are liquids you can see through, like water, Gatorade and Propel Water. You may also have black coffee and tea (no cream or milk).    Nothing by mouth within 2 hours of surgery. This includes gum, candy and breath mints.    If you drink alcohol: Stop drinking it the night before surgery.    If your care team tells you to take medicine on the morning of surgery, it's okay to take it with a sip of water.  Preventing infection    Shower or bathe the night before and morning of your surgery. Follow the instructions your clinic gave you. (If no instructions, use regular soap.)    Don't shave or clip hair near your surgery site. We'll remove the hair if needed.    Don't smoke or vape the morning of surgery. You may chew nicotine gum up to 2 hours before surgery. A nicotine patch is okay.  ? Note: Some surgeries require you to completely quit smoking and nicotine. Check with your surgeon.    Your care team will make every effort to keep you safe from infection. We will:  ? Clean our hands often with soap and water (or an alcohol-based hand rub).  ? Clean the skin at your surgery site with a special soap that kills germs.  ? Give you a special gown to keep you warm. (Cold raises the risk of infection.)  ? Wear special hair covers, masks, gowns and gloves during surgery.  ? Give antibiotic medicine, if prescribed. Not all surgeries need antibiotics.  What to bring on the day of surgery    Photo ID and insurance card    Copy of your health care directive, if you have one    Glasses and hearing aides (bring cases)  ? You can't wear contacts during surgery    Inhaler and eye drops, if you use them (tell us about these when  you arrive)    CPAP machine or breathing device, if you use them    A few personal items, if spending the night    If you have . . .  ? A pacemaker, ICD (cardiac defibrillator) or other implant: Bring the ID card.  ? An implanted stimulator: Bring the remote control.  ? A legal guardian: Bring a copy of the certified (court-stamped) guardianship papers.  Please remove any jewelry, including body piercings. Leave jewelry and other valuables at home.  If you're going home the day of surgery    You must have a responsible adult drive you home. They should stay with you overnight as well.    If you don't have someone to stay with you, and you aren't safe to go home alone, we may keep you overnight. Insurance often won't pay for this.  After surgery  If it's hard to control your pain or you need more pain medicine, please call your surgeon's office.  Questions?   If you have any questions for your care team, list them here: _________________________________________________________________________________________________________________________________________________________________________ ____________________________________ ____________________________________ ____________________________________  For informational purposes only. Not to replace the advice of your health care provider. Copyright   2003, 2019 [facility]. All rights reserved. Clinically reviewed by Alondra Smith MD. [employer] [id number] - REV 07/21.

## 2022-08-12 NOTE — NURSING NOTE
Due to patient being non-English speaking/uses sign language, an  was used for this visit. Only for face-to-face interpretation by an external agency, date and length of interpretation can be found on the scanned worksheet.       name: Tray Louis  Language: Jennifer  Agency:  [employer]  Telephone number: [phone]  Type of interpretation:  Face-to-face, spoken

## 2022-08-12 NOTE — PROGRESS NOTES
[facility] [address] [place] [address]  Phone: [phone]  Fax: [phone]  Primary Provider: Kayli Maxwell  Pre-op Performing Provider: DEVYN WALLACE    PREOPERATIVE EVALUATION:  Today's date: 8/12/2022    Jenise Paz is a 77 year old female who presents for a preoperative evaluation.    Surgical Information:  Surgery/Procedure: cataract surgery with goniosynechialysis  Surgery Location: [facility]  Surgeon: Dr. Celia Scott  Surgery Date: 8/29/2022  Where patient plans to recover: At home with family  Fax number for surgical facility: Note does not need to be faxed, will be available electronically in Epic.    Type of Anesthesia Anticipated: General    Assessment & Plan     The proposed surgical procedure is considered LOW risk.    Preop general physical exam  Physical exam was unremarkable.  Patient has clear lungs and heart was regular rate and rhythm.  Patient has dental implants but no dentures.  Patient reports being completely unable to see having some mild eye pain.  Only other complaint today was some mild diarrhea.  Patient's diabetes is very poorly controlled with last A1c greater than 14.0.  She also has some blood sugars consistently in the 400-500s.  I will message the eye team with my concerns with her uncontrolled diabetes but should otherwise be cleared for eye surgery.  Patient was instructed to continue all her medications including aspirin and metoprolol up to the day of surgery.  - Basic Metabolic Panel         Risks and Recommendations:  The patient has the following additional risks and recommendations for perioperative complications:   - No identified additional risk factors other than previously addressed    Medication Instructions:  Patient is to take all scheduled medications on the day of surgery    RECOMMENDATION:  APPROVAL GIVEN to proceed with proposed procedure pending review of diagnostic evaluation.  Cleared from a cardiac  standpoint.  Up to surgery team if okay to proceed with current uncontrolled diabetes.    Diagnosis or treatment significantly limited by social determinants of health -Hmong speaking    Subjective     HPI related to upcoming procedure: Patient has had trouble with her vision for a year or so but has gotten much worse in the last 2-3 months. It has become painful and decreases her ability to see. Patient is unsure of the cause but she is a diabetic and has not had regular eye exams.     Preop Questions 8/5/2022   1. Have you ever had a heart attack or stroke? No   2. Have you ever had surgery on your heart or blood vessels, such as a stent placement, a coronary artery bypass, or surgery on an artery in your head, neck, heart, or legs? YES - triple byspass   3. Do you have chest pain with activity? No   4. Do you have a history of  heart failure? No   5. Do you currently have a cold, bronchitis or symptoms of other infection? No   6. Do you have a cough, shortness of breath, or wheezing? No   7. Do you or anyone in your family have previous history of blood clots? No   8. Do you or does anyone in your family have a serious bleeding problem such as prolonged bleeding following surgeries or cuts? No   9. Have you ever had problems with anemia or been told to take iron pills? No   10. Have you had any abnormal blood loss such as black, tarry or bloody stools, or abnormal vaginal bleeding? No   11. Have you ever had a blood transfusion? No   12. Are you willing to have a blood transfusion if it is medically needed before, during, or after your surgery? Yes   13. Have you or any of your relatives ever had problems with anesthesia? No   14. Do you have sleep apnea, excessive snoring or daytime drowsiness? No   15. Do you have any artifical heart valves or other implanted medical devices like a pacemaker, defibrillator, or continuous glucose monitor? No   16. Do you have artificial joints? No   17. Are you allergic to  latex? No       Health Care Directive:  Patient does not have a Health Care Directive or Living Will: Discussed advance care planning with patient; information given to patient to review.    Status of Chronic Conditions:  CAD - Patient has a longstanding history of moderate-severe CAD. Patient denies recent chest pain or NTG use, denies exercise induced dyspnea or PND. Last EKG sinus rhythm.     DIABETES - Patient has a longstanding history of DiabetesType Type II . Patient is being treated with oral agents and denies significant side effects. Control has been poor. Complicating factors include but are not limited to: CAD/PVD.       Review of Systems  CONSTITUTIONAL: NEGATIVE for fever, chills, change in weight  ENT/MOUTH: NEGATIVE for ear, mouth and throat problems  RESP: NEGATIVE for significant cough or SOB  CV: NEGATIVE for chest pain, palpitations or peripheral edema    Patient Active Problem List    Diagnosis Date Noted     Compression fracture of L1 vertebra with routine healing 05/07/2021     Priority: Medium     Functional urinary incontinence 06/22/2020     Priority: Medium     Trouble getting up and making it to the bathroom at night.       Anemia, iron deficiency 09/13/2016     Priority: Medium     S/P CABG (coronary artery bypass graft) 09/13/2016     Priority: Medium     3 vessel in 8/2016 at [facility]       Coronary artery disease involving native coronary artery of native heart with unstable angina pectoris (H) 04/27/2016     Priority: Medium     Type 2 diabetes mellitus without complication, with long-term current use of insulin (H) 04/14/2016     Priority: Medium     Noncompliance with medication regimen 12/10/2015     Priority: Medium     Not taking any medication as of 12/10/2015.       Seasonal allergies 06/02/2014     Priority: Medium     Abnormal Pap smear of cervix 12/11/2012     Priority: Medium     4/29/15- Dr Cortez discussed in detail the importance of scheduling a colposcopy ASAP and pt agreed  "once her back pain improved.  12/23/14 ASCUS Pap with + high risk HPV.  Pt needs colposcopy ASAP!!.  1/6/15 Ariane Zepeda spoke with pt and she is refusing colposcopy.  2/18/14 ASCUS Pap with + high risk HPV.  Pt needs colposcopy ASAP!!  8/14/12 COLP APPT: ECC- mucin and rare endocervical cells with no definitive abnormalities.  Pt needs repeat Pap with HPV in 1yr.  6/22/12 ASCUS Pap with +high risk HPV 52 and unknown risk HPV.  No endocervical cells present, atrophy.  Pt needs colposcopy ASAP.  12/9/2008 nl Pap       Cervical spondylosis 12/11/2012     Priority: Medium     Referral to [facility] in 2013: \"Abnormal MRI of cervical spine in the past 9/2009 at [facility] showing C5-C6 DJD with moderate to severe bilateral foraminal narrowing and mild to moderate central canal narrowing, but did not want interventions or referral at that time.  Pain in neck and down arms is getting worse.  Would like further evaluation and possible injections if indicated.\"  Saw [facility] with repeat MRI, carpal tunnel on EMG and no radiculopathy on EMG.  Acupuncture helped with her neck pain.          PTSD (post-traumatic stress disorder) 12/11/2012     Priority: Medium     Hyperlipemia 12/11/2012     Priority: Medium     Hypertension 12/11/2012     Priority: Medium     Low back pain Chronic 12/11/2012     Priority: Medium     DDD (degenerative disc disease), lumbar 12/11/2012     Priority: Medium     Saw for lumbar pain last year 3/2018 and sent to PT for wheel eval and help with the pain. Saw them one time and did not want to go back. Has not had MRI of Lumbar spine.       Muscle Aches, Generalized (Myalgia) 12/11/2012     Priority: Medium     Thyroid nodule 12/11/2012     Priority: Medium     History of subtotal thyroidectomy 12/11/2012     Priority: Medium     Left thryoidectomy due to multiple lumps in 1988.  1/22/2019:Thyroid imaging due to past growing residual thyroid tissue needed. Never got thyroid US as intended in " 2014.  Normal thyroid function 10/2018.       Vitamin D deficiency 12/11/2012     Priority: Medium      Past Medical History:   Diagnosis Date     Diabetes (H)      Hypertension      Past Surgical History:   Procedure Laterality Date     CYST REMOVAL      on neck     OVARIAN CYST REMOVAL       ZZC LAP,CHOLECYSTECTOMY/EXPLORE N/A 4/25/2018    Procedure: CHOLECYSTECTOMY, LAPAROSCOPIC;  Surgeon: Devan De Leon MD;  Location: [facility] [place];  Service: General     Current Outpatient Medications   Medication Sig Dispense Refill     ACETAMINOPHEN EXTRA STRENGTH 500 MG tablet TAKE 2 TABLETS BY MOUTH THREE TIMES DAILY AS NEEDED 200 tablet 12     acetaZOLAMIDE (DIAMOX SEQUEL) 500 MG 12 hr capsule Take 1 capsule (500 mg) by mouth 2 times daily 28 capsule 0     alendronate (FOSAMAX) 70 MG tablet Take 1 tablet (70 mg) by mouth every 7 days 12 tablet 4     aspirin (ASA) 81 MG tablet Take 1 tablet daily 90 tablet 4     atorvastatin (LIPITOR) 80 MG tablet TAKE 1 TABLET(80 MG) BY MOUTH DAILY (Patient taking differently: Take 80 mg by mouth daily) 90 tablet 4     azelastine (OPTIVAR) 0.05 % ophthalmic solution PLACE 1 DROP INTO BOTH EYES TWICE DAILY (Patient taking differently: Place 1 drop into both eyes 2 times daily as needed Instill one drop into each affected eye.) 18 mL 11     blood glucose (NO BRAND SPECIFIED) lancets standard Use to test blood sugar 2 times daily or as directed. 100 each 3     blood glucose monitoring (CONTOUR NEXT MONITOR W/DEVICE KIT) meter device kit USE TO TEST TWICE DAILY 1 kit 0     canagliflozin (INVOKANA) 300 MG tablet Take 1 tablet (300 mg) by mouth every morning (before breakfast) 90 tablet 4     cetirizine (ZYRTEC) 10 MG tablet Take 1 tablet (10 mg) by mouth daily (Patient taking differently: Take 10 mg by mouth daily as needed) 90 tablet 4     CONTOUR NEXT TEST test strip TEST BLOOD SUGAR TWICE DAILY OR AS DIRECTED 150 strip 4     cyclobenzaprine (FLEXERIL) 5 MG tablet Take 1 tablet (5 mg) by  mouth nightly as needed for muscle spasms or other (back pain) 10 tablet 0     famotidine (PEPCID) 20 MG tablet Take 1 tablet (20 mg) by mouth 2 times daily as needed (poor appetite) 60 tablet 11     gabapentin (NEURONTIN) 300 MG capsule Take 1 capsule (300 mg) by mouth 2 times daily 60 capsule 11     isosorbide mononitrate (IMDUR) 30 MG 24 hr tablet TAKE 1/2 TABLET( 15MG) BY MOUTH EVERY DAY (Patient taking differently: Take 15 mg by mouth daily) 90 tablet 4     lisinopril (ZESTRIL) 2.5 MG tablet TAKE 1 TABLET(2.5 MG) BY MOUTH DAILY (Patient taking differently: Take 2.5 mg by mouth daily) 90 tablet 3     metFORMIN (GLUCOPHAGE XR) 500 MG 24 hr tablet TAKE 4 TABLETS BY MOUTH EVERY DAY WITH FOOD (Patient taking differently: Take 2,000 mg by mouth daily (with breakfast)) 360 tablet 3     metoprolol succinate ER (TOPROL XL) 25 MG 24 hr tablet TAKE 1 TABLET BY MOUTH EVERY DAY (Patient taking differently: Take 25 mg by mouth daily) 90 tablet 4     Microlet Lancets MISC USE TO TEST TWICE DAILY OR AS DIRECTED. 100 each 3     nitroGLYcerin (NITROSTAT) 0.4 MG sublingual tablet Place 1 tablet (0.4 mg) under the tongue every 5 minutes as needed for chest pain 25 tablet 12     Nutritional Supplements (GLUCERNA ADVANCE SHAKE) LIQD Take 237 mLs by mouth 3 times daily (with meals) 59045 mL 11       Allergies   Allergen Reactions     Nka [No Known Allergies]         Social History     Tobacco Use     Smoking status: Never Smoker     Smokeless tobacco: Never Used   Substance Use Topics     Alcohol use: No     Family History   Problem Relation Age of Onset     Diabetes No family hx of      Cancer No family hx of      Heart Disease No family hx of      Glaucoma No family hx of      Macular Degeneration No family hx of      History   Drug Use No         Objective     BP 93/89   Pulse 79   Temp 98.5  F (36.9  C) (Oral)   Resp 20   Wt 61.9 kg (136 lb 6.4 oz)   SpO2 95%   BMI 26.64 kg/m      Physical Exam  GENERAL APPEARANCE: healthy,  alert and no distress  HENT: ear canals and TM's normal and nose and mouth without ulcers or lesions  RESP: lungs clear to auscultation - no rales, rhonchi or wheezes  CV: regular rate and rhythm, normal S1 S2, no S3 or S4 and no murmur, click or rub   ABDOMEN: soft, nontender, no HSM or masses and bowel sounds normal  NEURO: Normal strength and tone, sensory exam grossly normal, mentation intact and speech normal    Recent Labs   Lab Test 07/24/22  1731 08/30/21  1403 04/19/21  1122 04/19/21  1108 03/01/21  1647 03/01/21  1626   HGB 12.3  --   --   --   --  11.3*     --   --   --   --   --    *  --   --  136.8   < >  --    POTASSIUM 4.4  --   --  3.6   < >  --    CR 1.00  --   --  0.6   < >  --    A1C >14.0* >14.0*   < >  --   --   --     < > = values in this interval not displayed.        Diagnostics:  Labs pending at this time.  Results will be reviewed when available.   No EKG this visit, completed in the last 90 days.    Revised Cardiac Risk Index (RCRI):  The patient has the following serious cardiovascular risks for perioperative complications:   - Coronary Artery Disease (MI, positive stress test, angina, Qs on EKG) = 1 point   - Diabetes Mellitus (on Insulin) = 1 point     RCRI Interpretation: 2 points: Class III (moderate risk - 6.6% complication rate)     Estimated Functional Capacity: Performs 4 METS exercise without symptoms (e.g., light housework, stairs, 4 mph walk, 7 mph bike, slow step dance)           Signed Electronically by: Esvin Landers MD  Copy of this evaluation report is provided to requesting physician.

## 2022-08-12 NOTE — PROGRESS NOTES
Preceptor Attestation:    I discussed the patient with the resident and evaluated the patient in person. I have verified the content of the note, which accurately reflects my assessment of the patient and the plan of care.  Her glycemic control is very poor, and we'll need to discuss safe parameters with optho.  AIC >14 for past year.      Supervising Physician:  Sudeep Resendez MD.

## 2022-09-12 ENCOUNTER — OFFICE VISIT (OUTPATIENT)
Dept: FAMILY MEDICINE | Facility: CLINIC | Age: 77
End: 2022-09-12
Payer: COMMERCIAL

## 2022-09-12 VITALS
RESPIRATION RATE: 16 BRPM | DIASTOLIC BLOOD PRESSURE: 62 MMHG | SYSTOLIC BLOOD PRESSURE: 104 MMHG | WEIGHT: 139.2 LBS | OXYGEN SATURATION: 96 % | TEMPERATURE: 99.2 F | HEIGHT: 58 IN | HEART RATE: 72 BPM | BODY MASS INDEX: 29.22 KG/M2

## 2022-09-12 DIAGNOSIS — Z23 NEED FOR PROPHYLACTIC VACCINATION AND INOCULATION AGAINST INFLUENZA: Primary | ICD-10-CM

## 2022-09-12 DIAGNOSIS — Z01.818 PREOP GENERAL PHYSICAL EXAM: ICD-10-CM

## 2022-09-12 PROCEDURE — G0008 ADMIN INFLUENZA VIRUS VAC: HCPCS

## 2022-09-12 PROCEDURE — 90662 IIV NO PRSV INCREASED AG IM: CPT

## 2022-09-12 PROCEDURE — 99213 OFFICE O/P EST LOW 20 MIN: CPT | Mod: 25

## 2022-09-12 NOTE — PROGRESS NOTES
[facility] [address] [place] [address]  Phone: [phone]  Fax: [phone]  Primary Provider: Kayli Maxwell  Pre-op Performing Provider: RONALDO MCMILLAN    PREOPERATIVE EVALUATION:  Today's date: 9/12/2022    Jenise Paz is a 77 year old female who presents for a preoperative evaluation.    Surgical Information:  Surgery/Procedure: Cataract  Surgery Location: [facility]  Surgeon: Dr. Calderón  Surgery Date: 10/17/22  Time of Surgery: TBD  Where patient plans to recover: At home with family  Fax number for surgical facility: [phone]    Type of Anesthesia Anticipated: General    Assessment & Plan     The proposed surgical procedure is considered LOW risk.    #Need for prophylactic vaccination and inoculation against influenza  - INFLUENZA, QUAD, HIGH DOSE, PF, 65YR + (FLUZONE HD)    #Preop general physical exam  Patient presents for preop for cataract surgery of her left eye.  Physical exam was unremarkable, ROS negative.  Patient has a history of poorly controlled diabetes with last A1c >14, this was true as of previous cataract surgery on right eye and at that time the surgeon was contacted about the uncontrolled diabetes and there were no concerns and able to continue with exam.    Risks and Recommendations:  The patient has the following additional risks and recommendations for perioperative complications:   - No identified additional risk factors other than previously addressed     Medication Instructions:  Patient is to take all scheduled medications on the day of surgery     RECOMMENDATION:  APPROVAL GIVEN to proceed with proposed procedure pending review of diagnostic evaluation.  Cleared from a cardiac standpoint.  Up to surgery team if okay to proceed with current uncontrolled diabetes.     Diagnosis or treatment significantly limited by social determinants of health -Hmong speaking     Subjective     HPI related to upcoming procedure:  Patient has had trouble with her vision for a year or so. R eye previously had the same surgery. L eye without pain, only blurry.    Preop Questions 9/5/2022   1. Have you ever had a heart attack or stroke? No   2. Have you ever had surgery on your heart or blood vessels, such as a stent placement, a coronary artery bypass, or surgery on an artery in your head, neck, heart, or legs? YES - triple bypass   3. Do you have chest pain with activity? No   4. Do you have a history of  heart failure? No   5. Do you currently have a cold, bronchitis or symptoms of other infection? No   6. Do you have a cough, shortness of breath, or wheezing? No   7. Do you or anyone in your family have previous history of blood clots? No   8. Do you or does anyone in your family have a serious bleeding problem such as prolonged bleeding following surgeries or cuts? No   9. Have you ever had problems with anemia or been told to take iron pills? No   10. Have you had any abnormal blood loss such as black, tarry or bloody stools, or abnormal vaginal bleeding? No   11. Have you ever had a blood transfusion? No   12. Are you willing to have a blood transfusion if it is medically needed before, during, or after your surgery? Yes   13. Have you or any of your relatives ever had problems with anesthesia? No   14. Do you have sleep apnea, excessive snoring or daytime drowsiness? No   15. Do you have any artifical heart valves or other implanted medical devices like a pacemaker, defibrillator, or continuous glucose monitor? No   16. Do you have artificial joints? No   17. Are you allergic to latex? No       Health Care Directive:  Patient does not have a Health Care Directive or Living Will: Discussed advance care planning with patient; information given to patient to review.      Status of Chronic Conditions:  CAD - Patient has a longstanding history of moderate-severe CAD. Patient denies recent chest pain or NTG use, denies exercise induced dyspnea  or PND. Last EKG sinus rhythm.      DIABETES - Patient has a longstanding history of DiabetesType Type II . Patient is being treated with oral agents and denies significant side effects. Control has been poor. Complicating factors include but are not limited to: CAD/PVD.     Review of Systems  CONSTITUTIONAL: NEGATIVE for fever, chills, change in weight   ENT/MOUTH: NEGATIVE for ear, mouth and throat problems  RESP: NEGATIVE for significant cough or SOB  CV: NEGATIVE for chest pain, palpitations or peripheral edema    Patient Active Problem List    Diagnosis Date Noted     Protein-calorie malnutrition, unspecified severity (H) 08/12/2022     Priority: Medium     Compression fracture of L1 vertebra with routine healing 05/07/2021     Priority: Medium     Functional urinary incontinence 06/22/2020     Priority: Medium     Trouble getting up and making it to the bathroom at night.       Anemia, iron deficiency 09/13/2016     Priority: Medium     S/P CABG (coronary artery bypass graft) 09/13/2016     Priority: Medium     3 vessel in 8/2016 at [facility]       Coronary artery disease involving native coronary artery of native heart with unstable angina pectoris (H) 04/27/2016     Priority: Medium     Type 2 diabetes mellitus with hyperglycemia, without long-term current use of insulin (H)      Priority: Medium     Noncompliance with medication regimen 12/10/2015     Priority: Medium     Not taking any medication as of 12/10/2015.       Seasonal allergies 06/02/2014     Priority: Medium     Abnormal Pap smear of cervix 12/11/2012     Priority: Medium     4/29/15- Dr Cortez discussed in detail the importance of scheduling a colposcopy ASAP and pt agreed once her back pain improved.  12/23/14 ASCUS Pap with + high risk HPV.  Pt needs colposcopy ASAP!!.  1/6/15 Ariane Zepeda spoke with pt and she is refusing colposcopy.  2/18/14 ASCUS Pap with + high risk HPV.  Pt needs colposcopy ASAP!!  8/14/12 COLP APPT: ECC- mucin and rare  "endocervical cells with no definitive abnormalities.  Pt needs repeat Pap with HPV in 1yr.  6/22/12 ASCUS Pap with +high risk HPV 52 and unknown risk HPV.  No endocervical cells present, atrophy.  Pt needs colposcopy ASAP.  12/9/2008 nl Pap       Cervical spondylosis 12/11/2012     Priority: Medium     Referral to [facility] in 2013: \"Abnormal MRI of cervical spine in the past 9/2009 at [facility] showing C5-C6 DJD with moderate to severe bilateral foraminal narrowing and mild to moderate central canal narrowing, but did not want interventions or referral at that time.  Pain in neck and down arms is getting worse.  Would like further evaluation and possible injections if indicated.\"  Saw [facility] with repeat MRI, carpal tunnel on EMG and no radiculopathy on EMG.  Acupuncture helped with her neck pain.          PTSD (post-traumatic stress disorder) 12/11/2012     Priority: Medium     Hyperlipemia 12/11/2012     Priority: Medium     Hypertension 12/11/2012     Priority: Medium     Low back pain Chronic 12/11/2012     Priority: Medium     DDD (degenerative disc disease), lumbar 12/11/2012     Priority: Medium     Saw for lumbar pain last year 3/2018 and sent to PT for wheel eval and help with the pain. Saw them one time and did not want to go back. Has not had MRI of Lumbar spine.       Muscle Aches, Generalized (Myalgia) 12/11/2012     Priority: Medium     Thyroid nodule 12/11/2012     Priority: Medium     History of subtotal thyroidectomy 12/11/2012     Priority: Medium     Left thryoidectomy due to multiple lumps in 1988.  1/22/2019:Thyroid imaging due to past growing residual thyroid tissue needed. Never got thyroid US as intended in 2014.  Normal thyroid function 10/2018.       Vitamin D deficiency 12/11/2012     Priority: Medium      Past Medical History:   Diagnosis Date     Diabetes (H)      Hypertension      Past Surgical History:   Procedure Laterality Date     CATARACT EXTRACTION       CYST " REMOVAL      on neck     OVARIAN CYST REMOVAL       ZZC LAP,CHOLECYSTECTOMY/EXPLORE N/A 04/25/2018    Procedure: CHOLECYSTECTOMY, LAPAROSCOPIC;  Surgeon: Devan De Leon MD;  Location: [facility];  Service: General     Current Outpatient Medications   Medication Sig Dispense Refill     alendronate (FOSAMAX) 70 MG tablet Take 1 tablet (70 mg) by mouth every 7 days 12 tablet 4     atorvastatin (LIPITOR) 80 MG tablet TAKE 1 TABLET(80 MG) BY MOUTH DAILY (Patient taking differently: Take 80 mg by mouth daily) 90 tablet 4     canagliflozin (INVOKANA) 300 MG tablet Take 1 tablet (300 mg) by mouth every morning (before breakfast) 90 tablet 4     gabapentin (NEURONTIN) 300 MG capsule Take 1 capsule (300 mg) by mouth 2 times daily 60 capsule 11     isosorbide mononitrate (IMDUR) 30 MG 24 hr tablet TAKE 1/2 TABLET( 15MG) BY MOUTH EVERY DAY (Patient taking differently: Take 15 mg by mouth daily) 90 tablet 4     lisinopril (ZESTRIL) 2.5 MG tablet TAKE 1 TABLET(2.5 MG) BY MOUTH DAILY (Patient taking differently: Take 2.5 mg by mouth daily) 90 tablet 3     metFORMIN (GLUCOPHAGE XR) 500 MG 24 hr tablet TAKE 4 TABLETS BY MOUTH EVERY DAY WITH FOOD (Patient taking differently: Take 2,000 mg by mouth daily (with breakfast)) 360 tablet 3     metoprolol succinate ER (TOPROL XL) 25 MG 24 hr tablet TAKE 1 TABLET BY MOUTH EVERY DAY (Patient taking differently: Take 25 mg by mouth daily) 90 tablet 4     ACETAMINOPHEN EXTRA STRENGTH 500 MG tablet TAKE 2 TABLETS BY MOUTH THREE TIMES DAILY AS NEEDED 200 tablet 12     acetaZOLAMIDE (DIAMOX SEQUEL) 500 MG 12 hr capsule Take 1 capsule (500 mg) by mouth 2 times daily (Patient not taking: Reported on 9/12/2022) 28 capsule 0     aspirin (ASA) 81 MG tablet Take 1 tablet daily 90 tablet 4     azelastine (OPTIVAR) 0.05 % ophthalmic solution PLACE 1 DROP INTO BOTH EYES TWICE DAILY (Patient not taking: Reported on 9/12/2022) 18 mL 11     blood glucose (NO BRAND SPECIFIED) lancets standard Use to  "test blood sugar 2 times daily or as directed. 100 each 3     blood glucose monitoring (CONTOUR NEXT MONITOR W/DEVICE KIT) meter device kit USE TO TEST TWICE DAILY 1 kit 0     cetirizine (ZYRTEC) 10 MG tablet Take 1 tablet (10 mg) by mouth daily (Patient taking differently: Take 10 mg by mouth daily as needed) 90 tablet 4     CONTOUR NEXT TEST test strip TEST BLOOD SUGAR TWICE DAILY OR AS DIRECTED 150 strip 4     cyclobenzaprine (FLEXERIL) 5 MG tablet Take 1 tablet (5 mg) by mouth nightly as needed for muscle spasms or other (back pain) 10 tablet 0     famotidine (PEPCID) 20 MG tablet Take 1 tablet (20 mg) by mouth 2 times daily as needed (poor appetite) 60 tablet 11     Microlet Lancets MISC USE TO TEST TWICE DAILY OR AS DIRECTED. 100 each 3     nitroGLYcerin (NITROSTAT) 0.4 MG sublingual tablet Place 1 tablet (0.4 mg) under the tongue every 5 minutes as needed for chest pain 25 tablet 12     Nutritional Supplements (GLUCERNA ADVANCE SHAKE) LIQD Take 237 mLs by mouth 3 times daily (with meals) 88168 mL 11       Allergies   Allergen Reactions     Nka [No Known Allergies]         Social History     Tobacco Use     Smoking status: Never Smoker     Smokeless tobacco: Never Used   Substance Use Topics     Alcohol use: No       History   Drug Use No         Objective     /62 (BP Location: Left arm, Patient Position: Sitting, Cuff Size: Adult Regular)   Pulse 72   Temp 99.2  F (37.3  C) (Oral)   Resp 16   Ht 1.48 m (4' 10.25\")   Wt 63.1 kg (139 lb 3.2 oz)   SpO2 96%   BMI 28.84 kg/m      Physical Exam    GENERAL APPEARANCE: healthy, alert and no distress     EYES: EOMI, PERRL     HENT: ear canals and TM's normal and nose and mouth without ulcers or lesions     NECK: no adenopathy, no asymmetry, masses, or scars and thyroid normal to palpation     RESP: lungs clear to auscultation - no rales, rhonchi or wheezes     CV: regular rates and rhythm, normal S1 S2, no S3 or S4 and no murmur, click or rub     ABDOMEN: "  soft, nontender, no HSM or masses and bowel sounds normal     MS: extremities normal- no gross deformities noted, no evidence of inflammation in joints, FROM in all extremities.     SKIN: no suspicious lesions or rashes     NEURO: Normal strength and tone, sensory exam grossly normal, mentation intact and speech normal     PSYCH: mentation appears normal. and affect normal/bright     LYMPHATICS: No cervical adenopathy    Recent Labs   Lab Test 08/12/22  1641 07/24/22  1731 08/30/21  1403 03/01/21  1647 03/01/21  1626   HGB  --  12.3  --   --  11.3*   PLT  --  200  --   --   --     131*  --    < >  --    POTASSIUM 4.3 4.4  --    < >  --    CR 0.59 1.00  --    < >  --    A1C  --  >14.0* >14.0*   < >  --     < > = values in this interval not displayed.        Diagnostics:  No labs were ordered during this visit.   No EKG required for low risk surgery (cataract, skin procedure, breast biopsy, etc).    Revised Cardiac Risk Index (RCRI):  The patient has the following serious cardiovascular risks for perioperative complications:   - Coronary Artery Disease (MI, positive stress test, angina, Qs on EKG) = 1 point   - Diabetes Mellitus (on Insulin) = 1 point      RCRI Interpretation: 2 points: Class III (moderate risk - 6.6% complication rate)     Estimated Functional Capacity: Performs 4 METS exercise without symptoms (e.g., light housework, stairs, 4 mph walk, 7 mph bike, slow step dance)         Signed Electronically by: Rubia Flores MD  Copy of this evaluation report is provided to requesting physician.

## 2022-09-12 NOTE — PROGRESS NOTES
Preceptor Attestation:    I discussed the patient with the resident and evaluated the patient in person. I have verified the content of the note, which accurately reflects my assessment of the patient and the plan of care.   Supervising Physician:  Deshawn Oliver MD.

## 2022-09-21 DIAGNOSIS — M54.42 CHRONIC LEFT-SIDED LOW BACK PAIN WITH LEFT-SIDED SCIATICA: ICD-10-CM

## 2022-09-21 DIAGNOSIS — G89.29 CHRONIC LEFT-SIDED LOW BACK PAIN WITH LEFT-SIDED SCIATICA: ICD-10-CM

## 2022-09-23 RX ORDER — GABAPENTIN 300 MG/1
CAPSULE ORAL
Qty: 60 CAPSULE | Refills: 11 | Status: SHIPPED | OUTPATIENT
Start: 2022-09-23 | End: 2024-08-02

## 2022-09-26 ENCOUNTER — DOCUMENTATION ONLY (OUTPATIENT)
Dept: FAMILY MEDICINE | Facility: CLINIC | Age: 77
End: 2022-09-26
Payer: COMMERCIAL

## 2022-09-26 PROCEDURE — 99499 UNLISTED E&M SERVICE: CPT | Mod: HE | Performed by: STUDENT IN AN ORGANIZED HEALTH CARE EDUCATION/TRAINING PROGRAM

## 2022-09-26 NOTE — PROGRESS NOTES
Interprofessional Team Consultation Note     Requesting Provider: Dr. Pradhan    Consultants:  Behavioral Health: Sarah). Desean Rojas  Care Coordination: Maksim Bradley  PharmD: Sarah). Ricardo - student  Family Medicine Physicians: Joes). aCrolynn Segura Dasrath    Identifying Data/Reason for Referral:  Patient Jenise Paz, preferred name Jenise, is 77 year old female who is cared for by Dr. Flores. Provider is requesting consultation related to development of care plan.  Relevant clinical information obtained from requesting provider, interprofessional team members noted above, and review of the medical record.  Kayli Maxwell is the primary care provider in Epic.    Patient Active Problem List   Diagnosis     Abnormal Pap smear of cervix     Cervical spondylosis     PTSD (post-traumatic stress disorder)     Hyperlipemia     Hypertension     Low back pain Chronic     DDD (degenerative disc disease), lumbar     Muscle Aches, Generalized (Myalgia)     Thyroid nodule     History of subtotal thyroidectomy     Vitamin D deficiency     Seasonal allergies     Noncompliance with medication regimen     Type 2 diabetes mellitus with hyperglycemia, without long-term current use of insulin (H)     Coronary artery disease involving native coronary artery of native heart with unstable angina pectoris (H)     Anemia, iron deficiency     S/P CABG (coronary artery bypass graft)     Functional urinary incontinence     Compression fracture of L1 vertebra with routine healing     Protein-calorie malnutrition, unspecified severity (H)     Current Outpatient Medications   Medication     ACETAMINOPHEN EXTRA STRENGTH 500 MG tablet     acetaZOLAMIDE (DIAMOX SEQUEL) 500 MG 12 hr capsule     alendronate (FOSAMAX) 70 MG tablet     aspirin (ASA) 81 MG tablet     atorvastatin (LIPITOR) 80 MG tablet     azelastine (OPTIVAR) 0.05 % ophthalmic solution     blood glucose (NO BRAND SPECIFIED) lancets standard     blood glucose monitoring (CONTOUR NEXT MONITOR  W/DEVICE KIT) meter device kit     canagliflozin (INVOKANA) 300 MG tablet     cetirizine (ZYRTEC) 10 MG tablet     CONTOUR NEXT TEST test strip     cyclobenzaprine (FLEXERIL) 5 MG tablet     famotidine (PEPCID) 20 MG tablet     gabapentin (NEURONTIN) 300 MG capsule     isosorbide mononitrate (IMDUR) 30 MG 24 hr tablet     lisinopril (ZESTRIL) 2.5 MG tablet     metFORMIN (GLUCOPHAGE XR) 500 MG 24 hr tablet     metoprolol succinate ER (TOPROL XL) 25 MG 24 hr tablet     Microlet Lancets MISC     nitroGLYcerin (NITROSTAT) 0.4 MG sublingual tablet     Nutritional Supplements (GLUCERNA ADVANCE SHAKE) LIQD     No current facility-administered medications for this visit.     Topics Discussed:  Patient with uncontrolled diabetes that could potentially benefit from additional support services. Dr. Pradhan is presenting this patient today after having precepted a recent appointment and there seems to be some challenges or concerns about patient's adherence to medication regimen. Team discussed some options for getting patient reconnected with services or at least getting a better understanding of current medication management. Reviewing EHR, it does appear that in April 2020 there was a medication reconciliation appointment with pharmacy. At that time patient's daughter, Saida, was managing all of the medication for patient.     There are a couple of options for better understanding patient's medication management including scheduling an appointment with pharmacy, community paramedic consult, or both. Team also thinks that it would be important for daughter to attend next PCP appointment, especially if she is still helping manage medication.    Pharmacy suggested patient may benefit from a continuous glucose monitoring system to get better data on trends. There is some documentation that patient did take lantis for diabetes; however, unclear on why they stopped this medication. Team suggested asking patient about previous  experience with that medication.     Recommendations/Action Items:  1. Dr. Pradhan will put in a community paramedic consult for medication reconciliation.  2. Patient follow up 10/10/2022 with Dr. Pradhan, discuss their interest in meeting with pharmacy team for medication and/or diabetes monitoring. (blue dotted that appointment)    Alexia Anton Psy.D.  they/them/theirs  Primary Care Behavioral Health Fellow     Disclaimer:  The above treatment recommendations are based on consultation with the patient's primary care provider and a review of relevant information in EPIC. I have not personally examined the patient. All recommendations should be implemented with considerations of the patient's relevant prior history and current clinical status. Please contact me with any questions about the care of this patient.

## 2022-09-29 NOTE — PROGRESS NOTES
I have reviewed and agree with the behavioral health fellow's documentation for this visit.  I did not personally see the patient.  Gabriella Rojas, PhD., LP

## 2022-10-19 ENCOUNTER — TELEPHONE (OUTPATIENT)
Dept: FAMILY MEDICINE | Facility: CLINIC | Age: 77
End: 2022-10-19

## 2022-10-19 DIAGNOSIS — Z01.818 PREOP GENERAL PHYSICAL EXAM: Primary | ICD-10-CM

## 2022-10-19 DIAGNOSIS — E11.9 TYPE 2 DIABETES MELLITUS WITHOUT COMPLICATION, WITHOUT LONG-TERM CURRENT USE OF INSULIN (H): ICD-10-CM

## 2022-10-19 NOTE — TELEPHONE ENCOUNTER
Call received from [facility] requesting additional pre op labs  CBC  CMP  A1C  D/t elevated A1C >14    Note routed to   /GREG

## 2022-10-20 ENCOUNTER — LAB (OUTPATIENT)
Dept: LAB | Facility: CLINIC | Age: 77
End: 2022-10-20
Payer: COMMERCIAL

## 2022-10-20 DIAGNOSIS — E11.9 TYPE 2 DIABETES MELLITUS WITHOUT COMPLICATION, WITHOUT LONG-TERM CURRENT USE OF INSULIN (H): ICD-10-CM

## 2022-10-20 DIAGNOSIS — Z01.818 PREOP GENERAL PHYSICAL EXAM: ICD-10-CM

## 2022-10-20 LAB
ALBUMIN SERPL BCG-MCNC: 3.6 G/DL (ref 3.5–5.2)
ALP SERPL-CCNC: 106 U/L (ref 35–104)
ALT SERPL W P-5'-P-CCNC: 70 U/L (ref 10–35)
ANION GAP SERPL CALCULATED.3IONS-SCNC: 10 MMOL/L (ref 7–15)
AST SERPL W P-5'-P-CCNC: 35 U/L (ref 10–35)
BILIRUB SERPL-MCNC: 0.3 MG/DL
BUN SERPL-MCNC: 20.1 MG/DL (ref 8–23)
CALCIUM SERPL-MCNC: 8.7 MG/DL (ref 8.8–10.2)
CHLORIDE SERPL-SCNC: 104 MMOL/L (ref 98–107)
CREAT SERPL-MCNC: 0.53 MG/DL (ref 0.51–0.95)
DEPRECATED HCO3 PLAS-SCNC: 25 MMOL/L (ref 22–29)
ERYTHROCYTE [DISTWIDTH] IN BLOOD BY AUTOMATED COUNT: 17.1 % (ref 10–15)
GFR SERPL CREATININE-BSD FRML MDRD: >90 ML/MIN/1.73M2
GLUCOSE SERPL-MCNC: 293 MG/DL (ref 70–99)
HBA1C MFR BLD: 12.1 % (ref 0–5.6)
HCT VFR BLD AUTO: 35.4 % (ref 35–47)
HGB BLD-MCNC: 10.9 G/DL (ref 11.7–15.7)
MCH RBC QN AUTO: 21.2 PG (ref 26.5–33)
MCHC RBC AUTO-ENTMCNC: 30.8 G/DL (ref 31.5–36.5)
MCV RBC AUTO: 69 FL (ref 78–100)
PLATELET # BLD AUTO: 217 10E3/UL (ref 150–450)
POTASSIUM SERPL-SCNC: 4.4 MMOL/L (ref 3.4–5.3)
PROT SERPL-MCNC: 6.1 G/DL (ref 6.4–8.3)
RBC # BLD AUTO: 5.15 10E6/UL (ref 3.8–5.2)
SODIUM SERPL-SCNC: 139 MMOL/L (ref 136–145)
WBC # BLD AUTO: 6.8 10E3/UL (ref 4–11)

## 2022-10-20 PROCEDURE — 36415 COLL VENOUS BLD VENIPUNCTURE: CPT

## 2022-10-20 PROCEDURE — 80053 COMPREHEN METABOLIC PANEL: CPT

## 2022-10-20 PROCEDURE — 83036 HEMOGLOBIN GLYCOSYLATED A1C: CPT

## 2022-10-20 PROCEDURE — 85027 COMPLETE CBC AUTOMATED: CPT

## 2022-11-04 NOTE — NURSING NOTE
Due to patient being non-English speaking/uses sign language, an  was used for this visit. Only for face-to-face interpretation by an external agency, date and length of interpretation can be found on the scanned worksheet.     name: Stephany  Agency: [employer]  Language: Jennifer   Telephone number: [phone]  Type of interpretation: Telemedicine, spoken       No

## 2022-11-25 DIAGNOSIS — E11.65 TYPE 2 DIABETES MELLITUS WITH HYPERGLYCEMIA, WITHOUT LONG-TERM CURRENT USE OF INSULIN (H): ICD-10-CM

## 2022-11-26 RX ORDER — CANAGLIFLOZIN 300 MG/1
TABLET, FILM COATED ORAL
Qty: 90 TABLET | Refills: 4 | Status: SHIPPED | OUTPATIENT
Start: 2022-11-26 | End: 2023-05-30

## 2022-12-16 DIAGNOSIS — I25.110 CORONARY ARTERY DISEASE INVOLVING NATIVE CORONARY ARTERY OF NATIVE HEART WITH UNSTABLE ANGINA PECTORIS (H): ICD-10-CM

## 2022-12-16 RX ORDER — LISINOPRIL 2.5 MG/1
2.5 TABLET ORAL DAILY
Qty: 90 TABLET | Refills: 1 | Status: SHIPPED | OUTPATIENT
Start: 2022-12-16 | End: 2023-04-03 | Stop reason: SINTOL

## 2023-03-20 ENCOUNTER — DOCUMENTATION ONLY (OUTPATIENT)
Dept: FAMILY MEDICINE | Facility: CLINIC | Age: 78
End: 2023-03-20

## 2023-03-20 ENCOUNTER — OFFICE VISIT (OUTPATIENT)
Dept: FAMILY MEDICINE | Facility: CLINIC | Age: 78
End: 2023-03-20
Payer: COMMERCIAL

## 2023-03-20 VITALS
OXYGEN SATURATION: 96 % | SYSTOLIC BLOOD PRESSURE: 90 MMHG | DIASTOLIC BLOOD PRESSURE: 51 MMHG | WEIGHT: 130 LBS | RESPIRATION RATE: 14 BRPM | BODY MASS INDEX: 26.94 KG/M2 | TEMPERATURE: 98.1 F | HEART RATE: 64 BPM

## 2023-03-20 DIAGNOSIS — D50.9 MICROCYTIC ANEMIA: ICD-10-CM

## 2023-03-20 DIAGNOSIS — E11.65 TYPE 2 DIABETES MELLITUS WITH HYPERGLYCEMIA, WITHOUT LONG-TERM CURRENT USE OF INSULIN (H): Primary | ICD-10-CM

## 2023-03-20 LAB
CHOLEST SERPL-MCNC: 128 MG/DL
CREAT UR-MCNC: 11.2 MG/DL
HDLC SERPL-MCNC: 45 MG/DL
LDLC SERPL CALC-MCNC: 53 MG/DL
MICROALBUMIN UR-MCNC: <12 MG/L
MICROALBUMIN/CREAT UR: NORMAL MG/G{CREAT}
NONHDLC SERPL-MCNC: 83 MG/DL
TRIGL SERPL-MCNC: 152 MG/DL

## 2023-03-20 PROCEDURE — 80048 BASIC METABOLIC PNL TOTAL CA: CPT | Performed by: STUDENT IN AN ORGANIZED HEALTH CARE EDUCATION/TRAINING PROGRAM

## 2023-03-20 PROCEDURE — 99215 OFFICE O/P EST HI 40 MIN: CPT | Mod: GC | Performed by: STUDENT IN AN ORGANIZED HEALTH CARE EDUCATION/TRAINING PROGRAM

## 2023-03-20 PROCEDURE — 82043 UR ALBUMIN QUANTITATIVE: CPT | Performed by: STUDENT IN AN ORGANIZED HEALTH CARE EDUCATION/TRAINING PROGRAM

## 2023-03-20 PROCEDURE — 80061 LIPID PANEL: CPT | Performed by: STUDENT IN AN ORGANIZED HEALTH CARE EDUCATION/TRAINING PROGRAM

## 2023-03-20 PROCEDURE — 83036 HEMOGLOBIN GLYCOSYLATED A1C: CPT | Performed by: STUDENT IN AN ORGANIZED HEALTH CARE EDUCATION/TRAINING PROGRAM

## 2023-03-20 PROCEDURE — 36415 COLL VENOUS BLD VENIPUNCTURE: CPT | Performed by: STUDENT IN AN ORGANIZED HEALTH CARE EDUCATION/TRAINING PROGRAM

## 2023-03-20 PROCEDURE — 82570 ASSAY OF URINE CREATININE: CPT | Performed by: STUDENT IN AN ORGANIZED HEALTH CARE EDUCATION/TRAINING PROGRAM

## 2023-03-20 RX ORDER — METOPROLOL SUCCINATE 25 MG/1
12.5 TABLET, EXTENDED RELEASE ORAL DAILY
Status: CANCELLED | OUTPATIENT
Start: 2023-03-20

## 2023-03-20 NOTE — PROGRESS NOTES
Preceptor Attestation:   Patient seen, evaluated and discussed with the resident. I have verified the content of the note, which accurately reflects my assessment of the patient and the plan of care.   Supervising Physician:  Wali Short MD

## 2023-03-20 NOTE — PROGRESS NOTES
To be completed in Nursing note:    Please reference list for forms that require a visit for completion.  Please remind patients that providers are given 3-5 business days to complete and return forms.      Form type: [employer] ~ INCONTINENCE SUPPLY ORDER PRESCRIPTION    Date form received: 3/10/2023    Date form completed by Physician: 3/21/2023 GOT BACK FROM DOCTOR    How was form returned to patient (mailed, faxed, or at  for patient to ):    Fax to [phone]    Date form mailed/faxed/left at  for patient and sent to HIM for scanning:    Fax on 3/22/2023    Once form is left for patient, faxed, or mailed PCS will then close the documentation only encounter.

## 2023-03-20 NOTE — PATIENT INSTRUCTIONS
Check your sugars daily    Follow up in 1 wk   Visit with PharmD at next visit also  Bring your medications with you

## 2023-03-20 NOTE — PROGRESS NOTES
Assessment & Plan   Type 2 diabetes mellitus with hyperglycemia, without long-term current use of insulin (H)  Suspect uncontrolled diabetes contributing to patient's symptoms, labs as below.  Recommend patient restart insulin based off her previous A1c and recent blood sugar levels, patient declines at today.  Patient vague nonspecific complaints likely due to uncontrolled hyperglycemia.  No focal neurologic deficits.  Patient to return in 1 week, to bring in medications for MTM visit with Pharm.D. as well  Addendum: Labs showing hyperglycemia with sugars of 700s with electrolyte derangements, normal kidney function, no anion gap.  Called patient on 3/21 and recommended ER visit, patient agreeable.  - Hemoglobin A1c  - Lipid Panel  - Albumin Random Urine Quantitative with Creat Ratio  - Basic metabolic panel  - Lipid Panel  - Albumin Random Urine Quantitative with Creat Ratio  - Basic metabolic panel  - Hepatic function panel  - Hemoglobin A1c  - Hemoglobin A1c  - SD SIGN LANGUAGE INTERP, FACE TO FACE, PER 15 MNS    Microcytic anemia  History of microcytic anemia, suspect iron deficiency.  Intermittently on iron supplements in the past, last ferritin level checked in 2018 that was normal.  I am unable to see if any more recent iron deficiency evaluation was done.  Labs as below, appears they were unable to be added on.  Recommended they get checked at next blood draw.  Suspect component of microcytic anemia may contribute to inaccurate readings of A1c, depending on degree of anemia.  - CBC with Platelets & Differential  - Iron & Iron Binding Capacity  - Ferritin  - Transferrin    Goals of care  On a 5/17/2021 clinic note, there was mention that patient's syncopal in regards to her diabetes was to be comfortable and was not interested in restarting her diabetes medications at that time.  Recommend revisiting at next visit as an clarifying goals of care.  This was unable to be discussed at this visit due to time  "constraints    Diagnosis or treatment significantly limited by social determinants of health - Hmong speaking  Ordering of each unique test     BMI:   Estimated body mass index is 26.94 kg/m  as calculated from the following:    Height as of 9/12/22: 1.48 m (4' 10.25\").    Weight as of this encounter: 59 kg (130 lb).       Patient Instructions   Check your sugars daily    Follow up in 1 wk   Visit with PharmD at next visit also  Bring your medications with you       Follow Up:  Return in about 1 week (around 3/27/2023) for T2DM with PharmD as well .    Options for treatment and follow-up care were reviewed with the patient who was engaged and actively involved in the decision making process, verbalized understanding of the options discussed, and satisfied with the final plan.    Patient was staffed with supervising physician, Dr. Wali Short, who agrees with the findings and plan.     Caio Zepeda DO, PGY-3  [facility] Medicine      Subjective   Jenise Paz is a 77 year old year old female who presents for the following health issues  accompanied by her daughter and   Past Medical History:   Diagnosis Date     Chronic kidney disease, stage 2 (mild) 3/22/2023     Diabetes (H)      Hyperlipidemia 10/13/2016     Hypertension      Patient Active Problem List   Diagnosis     Abnormal Pap smear of cervix     Cervical spondylosis     PTSD (post-traumatic stress disorder)     Hyperlipemia     Hypertension     Low back pain Chronic     DDD (degenerative disc disease), lumbar     Muscle Aches, Generalized (Myalgia)     Thyroid nodule     History of subtotal thyroidectomy     Vitamin D deficiency     Seasonal allergies     Noncompliance with medication regimen     Type 2 diabetes mellitus with hyperglycemia, without long-term current use of insulin (H)     Coronary artery disease involving native coronary artery of native heart with unstable angina pectoris (H)     Anemia, iron deficiency     S/P CABG (coronary " artery bypass graft)     Functional urinary incontinence     Compression fracture of L1 vertebra with routine healing     Protein-calorie malnutrition, unspecified severity (H)     Acute non Q wave MI (myocardial infarction), initial episode of care (H)     Acute non-STEMI < 8 weeks prior, subsequent admission after initial     Cholecystitis     Presence of aortocoronary bypass graft     Microcytic anemia     Arteriosclerosis of coronary artery     Coronary artery disease involving native coronary artery with unstable angina pectoris (H)     Type 2 diabetes mellitus (H)     Hyperlipidemia     Essential hypertension     Chronic kidney disease, stage 2 (mild)     Chief Complaint   Patient presents with     Diabetes   T2DM:  A1c 12.1% 10/2022, previously >14%. BMP 10/2022 with normal kidney function, albumin ratio 39.7 3/2021.   Reportedly on Invokana 300mg, Metformin 2000mg, and intermittent medication compliance.  Daughter says she does not like taking her meds all the time  Previously tried Insulin but did not like self injections, also did not tolerate daily or weekly GLP-1's.  Does not check sugars at home frequently, last check it was greater than 3-400.  Does not know when it was, thinks maybe a few days ago  Denies chest pain, nausea, vomiting, diarrhea, abdominal pain.     Poor appetite:  Chronic for few months with no acute worsening.  Associated with weakness primarily of lower extremities, has been on for few months to years.  Denies any pains of lower extremity, able to walk okay but has to go slow.  Unable to describe pain further    Denies mood issues    Hx of STEMI 2016  CAD s/p CABG    Wt Readings from Last 4 Encounters:   03/21/23 59 kg (130 lb)   03/20/23 59 kg (130 lb)   09/12/22 63.1 kg (139 lb 3.2 oz)   08/12/22 61.9 kg (136 lb 6.4 oz)     Review of Systems:   Constitutional, HEENT, cardiovascular, pulmonary, GI, , musculoskeletal, neuro, skin, endocrine and psych systems are negative, except as  otherwise noted.     Objective     BP 90/51 (BP Location: Right arm, Patient Position: Sitting, Cuff Size: Adult Regular)   Pulse 64   Temp 98.1  F (36.7  C) (Tympanic)   Resp 14   Wt 59 kg (130 lb)   SpO2 96%   Breastfeeding No   BMI 26.94 kg/m    Body mass index is 26.94 kg/m .    Physical Exam  Constitutional:       General: She is not in acute distress.     Appearance: She is not ill-appearing.   HENT:      Head: Normocephalic and atraumatic.      Mouth/Throat:      Mouth: Mucous membranes are dry.      Pharynx: No oropharyngeal exudate or posterior oropharyngeal erythema.   Eyes:      Conjunctiva/sclera: Conjunctivae normal.      Pupils: Pupils are equal, round, and reactive to light.   Cardiovascular:      Rate and Rhythm: Normal rate and regular rhythm.      Pulses: Normal pulses.      Heart sounds: Normal heart sounds. No murmur heard.  Pulmonary:      Effort: Pulmonary effort is normal. No respiratory distress.      Breath sounds: No stridor.   Abdominal:      General: Bowel sounds are normal. There is no distension.      Palpations: There is no mass.      Tenderness: There is no abdominal tenderness.      Hernia: No hernia is present.   Musculoskeletal:      Cervical back: No rigidity or tenderness.   Lymphadenopathy:      Cervical: No cervical adenopathy.   Skin:     Capillary Refill: Capillary refill takes 2 to 3 seconds.   Neurological:      Comments: Slowed gait        ----- Service Performed and Documented by Resident or Fellow ------

## 2023-03-21 ENCOUNTER — MEDICAL CORRESPONDENCE (OUTPATIENT)
Dept: HEALTH INFORMATION MANAGEMENT | Facility: CLINIC | Age: 78
End: 2023-03-21

## 2023-03-21 ENCOUNTER — TELEPHONE (OUTPATIENT)
Dept: FAMILY MEDICINE | Facility: CLINIC | Age: 78
End: 2023-03-21
Payer: COMMERCIAL

## 2023-03-21 ENCOUNTER — DOCUMENTATION ONLY (OUTPATIENT)
Dept: FAMILY MEDICINE | Facility: CLINIC | Age: 78
End: 2023-03-21

## 2023-03-21 ENCOUNTER — HOSPITAL ENCOUNTER (EMERGENCY)
Facility: CLINIC | Age: 78
Discharge: HOME | End: 2023-03-21
Attending: EMERGENCY MEDICINE | Admitting: EMERGENCY MEDICINE
Payer: COMMERCIAL

## 2023-03-21 VITALS
SYSTOLIC BLOOD PRESSURE: 105 MMHG | HEART RATE: 62 BPM | RESPIRATION RATE: 16 BRPM | WEIGHT: 130 LBS | TEMPERATURE: 97.1 F | BODY MASS INDEX: 25.52 KG/M2 | DIASTOLIC BLOOD PRESSURE: 60 MMHG | OXYGEN SATURATION: 95 % | HEIGHT: 60 IN

## 2023-03-21 DIAGNOSIS — R73.9 HYPERGLYCEMIA: ICD-10-CM

## 2023-03-21 PROBLEM — I25.110 CORONARY ARTERY DISEASE INVOLVING NATIVE CORONARY ARTERY WITH UNSTABLE ANGINA PECTORIS (H): Status: ACTIVE | Noted: 2023-03-21

## 2023-03-21 PROBLEM — K81.9 CHOLECYSTITIS: Status: ACTIVE | Noted: 2018-04-25

## 2023-03-21 PROBLEM — D50.9 MICROCYTIC ANEMIA: Status: ACTIVE | Noted: 2018-04-27

## 2023-03-21 LAB
ALBUMIN SERPL-MCNC: 3.5 G/DL (ref 3.5–5)
ALBUMIN UR-MCNC: NEGATIVE MG/DL
ALP SERPL-CCNC: 143 U/L (ref 45–120)
ALT SERPL W P-5'-P-CCNC: 48 U/L (ref 0–45)
ANION GAP SERPL CALCULATED.3IONS-SCNC: 10 MMOL/L (ref 5–18)
ANION GAP SERPL CALCULATED.3IONS-SCNC: 15 MMOL/L (ref 7–15)
ANION GAP SERPL CALCULATED.3IONS-SCNC: 5 MMOL/L (ref 5–18)
APPEARANCE UR: CLEAR
AST SERPL W P-5'-P-CCNC: 40 U/L (ref 0–40)
BASE EXCESS BLDV CALC-SCNC: 0.3 MMOL/L
BILIRUB SERPL-MCNC: 0.4 MG/DL (ref 0–1)
BILIRUB UR QL STRIP: NEGATIVE
BUN SERPL-MCNC: 15 MG/DL (ref 8–28)
BUN SERPL-MCNC: 16.4 MG/DL (ref 8–23)
BUN SERPL-MCNC: 17 MG/DL (ref 8–28)
CALCIUM SERPL-MCNC: 8 MG/DL (ref 8.5–10.5)
CALCIUM SERPL-MCNC: 8.6 MG/DL (ref 8.5–10.5)
CALCIUM SERPL-MCNC: 8.9 MG/DL (ref 8.8–10.2)
CHLORIDE BLD-SCNC: 104 MMOL/L (ref 98–107)
CHLORIDE BLD-SCNC: 95 MMOL/L (ref 98–107)
CHLORIDE SERPL-SCNC: 94 MMOL/L (ref 98–107)
CO2 SERPL-SCNC: 22 MMOL/L (ref 22–31)
CO2 SERPL-SCNC: 24 MMOL/L (ref 22–31)
COLOR UR AUTO: COLORLESS
CREAT SERPL-MCNC: 0.57 MG/DL (ref 0.51–0.95)
CREAT SERPL-MCNC: 0.72 MG/DL (ref 0.6–1.1)
CREAT SERPL-MCNC: 1.01 MG/DL (ref 0.6–1.1)
DEPRECATED HCO3 PLAS-SCNC: 20 MMOL/L (ref 22–29)
GFR SERPL CREATININE-BSD FRML MDRD: 58 ML/MIN/1.73M2
GFR SERPL CREATININE-BSD FRML MDRD: 87 ML/MIN/1.73M2
GFR SERPL CREATININE-BSD FRML MDRD: >90 ML/MIN/1.73M2
GLUCOSE BLD-MCNC: 488 MG/DL (ref 70–125)
GLUCOSE BLD-MCNC: 703 MG/DL (ref 70–125)
GLUCOSE BLDC GLUCOMTR-MCNC: >600 MG/DL (ref 70–99)
GLUCOSE SERPL-MCNC: 739 MG/DL (ref 70–99)
GLUCOSE UR STRIP-MCNC: >1000 MG/DL
HBA1C MFR BLD: >20.1 %
HCO3 BLDV-SCNC: 26 MMOL/L (ref 24–30)
HGB UR QL STRIP: NEGATIVE
HOLD SPECIMEN: NORMAL
KETONES BLD-SCNC: 0.15 MMOL/L
KETONES UR STRIP-MCNC: NEGATIVE MG/DL
LEUKOCYTE ESTERASE UR QL STRIP: NEGATIVE
MAGNESIUM SERPL-MCNC: 2.3 MG/DL (ref 1.8–2.6)
NITRATE UR QL: NEGATIVE
OXYHGB MFR BLDV: 83.1 % (ref 70–75)
PCO2 BLDV: 47 MM HG (ref 35–50)
PH BLDV: 7.35 [PH] (ref 7.35–7.45)
PH UR STRIP: 5.5 [PH] (ref 5–7)
PO2 BLDV: 50 MM HG (ref 25–47)
POTASSIUM BLD-SCNC: 4.2 MMOL/L (ref 3.5–5)
POTASSIUM BLD-SCNC: 5.8 MMOL/L (ref 3.5–5)
POTASSIUM SERPL-SCNC: 5.4 MMOL/L (ref 3.4–5.3)
PROT SERPL-MCNC: 6.4 G/DL (ref 6–8)
RBC URINE: 1 /HPF
SAO2 % BLDV: 84.7 % (ref 70–75)
SODIUM SERPL-SCNC: 127 MMOL/L (ref 136–145)
SODIUM SERPL-SCNC: 129 MMOL/L (ref 136–145)
SODIUM SERPL-SCNC: 133 MMOL/L (ref 136–145)
SP GR UR STRIP: 1.03 (ref 1–1.03)
UROBILINOGEN UR STRIP-MCNC: <2 MG/DL
WBC URINE: 1 /HPF

## 2023-03-21 PROCEDURE — 36415 COLL VENOUS BLD VENIPUNCTURE: CPT | Performed by: EMERGENCY MEDICINE

## 2023-03-21 PROCEDURE — 96361 HYDRATE IV INFUSION ADD-ON: CPT

## 2023-03-21 PROCEDURE — 99283 EMERGENCY DEPT VISIT LOW MDM: CPT

## 2023-03-21 PROCEDURE — 82010 KETONE BODYS QUAN: CPT | Performed by: EMERGENCY MEDICINE

## 2023-03-21 PROCEDURE — 96360 HYDRATION IV INFUSION INIT: CPT

## 2023-03-21 PROCEDURE — 83735 ASSAY OF MAGNESIUM: CPT | Performed by: EMERGENCY MEDICINE

## 2023-03-21 PROCEDURE — 258N000003 HC RX IP 258 OP 636: Performed by: EMERGENCY MEDICINE

## 2023-03-21 PROCEDURE — 82947 ASSAY GLUCOSE BLOOD QUANT: CPT | Performed by: EMERGENCY MEDICINE

## 2023-03-21 PROCEDURE — 81001 URINALYSIS AUTO W/SCOPE: CPT | Performed by: EMERGENCY MEDICINE

## 2023-03-21 PROCEDURE — 82805 BLOOD GASES W/O2 SATURATION: CPT | Performed by: EMERGENCY MEDICINE

## 2023-03-21 PROCEDURE — 82962 GLUCOSE BLOOD TEST: CPT

## 2023-03-21 RX ADMIN — SODIUM CHLORIDE 1000 ML: 9 INJECTION, SOLUTION INTRAVENOUS at 18:30

## 2023-03-21 RX ADMIN — SODIUM CHLORIDE 1000 ML: 9 INJECTION, SOLUTION INTRAVENOUS at 19:58

## 2023-03-21 ASSESSMENT — ACTIVITIES OF DAILY LIVING (ADL): ADLS_ACUITY_SCORE: 35

## 2023-03-21 ASSESSMENT — ENCOUNTER SYMPTOMS
WEAKNESS: 1
FREQUENCY: 1

## 2023-03-21 NOTE — TELEPHONE ENCOUNTER
Critical lab value called in today for . H1C >20.1 as well. Dr. Zepeda notified.     Called w/ a [facility]  but daughter-in-law Saida picked up instead. She speaks english.    She said she was there yesterday w/ pt during her appointment w/ Dr. Zepeda.    She said pt told him she has loss appetite, weak, and fatigue. Her legs also feel weak and numb at times.     Saida said pt is sleeping now and she usually sleeps until 12 pm. She said pt is the same. No real changes. Denies n/v, SOB, confusion, loss of conscious, and blurry vision. Reports pt feeling thirsty, dry mouth, and needing to pee. Advised her her symptoms are most likely due to her high BS.     She reports pt still wants to have a little sugar with everything that she eats. Pt refusing to use insulin and was discussed w/ Dr. Zepeda. She reports Dr. Zepeda did talk to pt about his concern regarding her DM2. She said pt is stubborn. It took a lot to just get her in to see a doctor.     Advised her pt should go to ER but she said pt will not go. Advised her to take pt to ER if she is SOB, n/v, and confused. Saida verbalized understanding.    She would like to know what Dr. Zepeda would advise.    Shanique Philip RN on 3/21/2023 at 9:20 AM

## 2023-03-21 NOTE — ED TRIAGE NOTES
The patient presents to the ED with high blood sugar. She went to her doctor yesterday and they reported her A1C was 20. They told her that her blood sugar was over 700. She is insulin dependent but does not use insulin because she does not like it. She also takes her metformin when she remembers. The patient reports frequent urination and right back/flank pain.

## 2023-03-21 NOTE — ED NOTES
Expected Patient Referral to ED  9:09 AM    Referring Clinic/Provider:  [facility], seen yesterday    Reason for referral/Clinical facts:    Seen yesterday  Fatigue, weakness, acute on chronic  No anion gap    Recommendations provided:  Send to ED for further evaluation    Caller was informed that this institution does possess the capabilities and/or resources to provide for patient and should be transferred to our facility.    Discussed that if direct admit is sought and any hurdles are encountered, this ED would be happy to see the patient and evaluate.    Informed caller that recommendations provided are recommendations based only on the facts provided and that they responsible to accept or reject the advice, or to seek a formal in person consultation as needed and that this ED will see/treat patient should they arrive.      Zeinab Aparicio MD  [facility] EMERGENCY ROOM  [address] [place] [id number]  [phone]       Zeinab Aparicio MD  03/21/23 0910

## 2023-03-21 NOTE — TELEPHONE ENCOUNTER
Contacted patient this AM and recommended ER visit. Patient agreeable to evaluation in [facility] ED. I contacted [facility] and discussed with ER provider. I discussed case with [facility] residents. I will plan to contact patient again this afternoon if she has not presented to ED by then.  AY

## 2023-03-22 ENCOUNTER — OFFICE VISIT (OUTPATIENT)
Dept: FAMILY MEDICINE | Facility: CLINIC | Age: 78
End: 2023-03-22
Payer: COMMERCIAL

## 2023-03-22 VITALS
RESPIRATION RATE: 18 BRPM | OXYGEN SATURATION: 96 % | DIASTOLIC BLOOD PRESSURE: 71 MMHG | TEMPERATURE: 97.2 F | HEART RATE: 68 BPM | SYSTOLIC BLOOD PRESSURE: 106 MMHG

## 2023-03-22 DIAGNOSIS — R63.0 POOR APPETITE: ICD-10-CM

## 2023-03-22 DIAGNOSIS — E11.65 TYPE 2 DIABETES MELLITUS WITH HYPERGLYCEMIA, WITHOUT LONG-TERM CURRENT USE OF INSULIN (H): Primary | ICD-10-CM

## 2023-03-22 DIAGNOSIS — E46 PROTEIN-CALORIE MALNUTRITION, UNSPECIFIED SEVERITY (H): ICD-10-CM

## 2023-03-22 DIAGNOSIS — N18.2 CHRONIC KIDNEY DISEASE, STAGE 2 (MILD): ICD-10-CM

## 2023-03-22 PROCEDURE — 99214 OFFICE O/P EST MOD 30 MIN: CPT | Mod: GC

## 2023-03-22 NOTE — DISCHARGE INSTRUCTIONS
Please go to the [facility] residency clinic at 8 AM tomorrow.  You have an 830 appointment, but they want to make sure that you arrive by 8 AM.

## 2023-03-22 NOTE — ED PROVIDER NOTES
"EMERGENCY DEPARTMENT ENCOUNTER      NAME: Jenise Paz  AGE: 77 year old female  YOB: 1945  MRN: [id number]  EVALUATION DATE & TIME: 3/21/2023  7:09 PM    PCP: Kayli Maxwell    ED PROVIDER: Edi Elias M.D.      Chief Complaint   Patient presents with     Hyperglycemia         FINAL IMPRESSION:  1. Hyperglycemia          ED COURSE & MEDICAL DECISION MAKING:    Pertinent Labs & Imaging studies reviewed below.  All EKGs below represent my independent interpretation.   ED Course as of 03/21/23 2206   Tue Mar 21, 2023   2119 Patient is a 77-year-old woman who presents with asymptomatic hyperglycemia.  She was seen for routine visit yesterday by the [facility] residency service, lab work returned today with alarming A1c, glucose, and she was told to come in.  She has not been taking her insulin because she did not like taking it.  On arrival she has normal vital signs, reports polyuria, polydipsia, some vague abdominal \"warmness\" but otherwise has been doing well.  Her initial BMP shows a glucose of 703, normal bicarb, normal anion gap.  Pseudohyponatremia at 127 and a potassium of 5.8.   2151 Glucose(!!): 488   2151 Sodium(!): 133   2151 Potassium: 4.2   2151 Chloride: 104   2151 Repeat lab work shows significant correction of electrolytes, glucose is also significantly improved.  I spoke to the in-house resident with her outpatient care team, they scheduled an appointment for 8:30 AM tomorrow morning, 6 hours from now.  Patient is amenable to restarting insulin, They can assist with this from the outpatient setting tomorrow.         Additional ED Course Timestamps:  7:19 PM I met with the patient, obtained history, performed an initial exam, and discussed options and plan for diagnostics and treatment here in the ED.   7:23 PM The lab called with a critical result. Blood glucose: 703.    Medical Decision Making    History:    Supplemental history from: Family Member/Significant Other    External " Record(s) reviewed: Documented in chart, if applicable.    Work Up:    Chart documentation includes differential considered and any EKGs or imaging independently interpreted by provider, where specified.    In additional to work up documented, I considered the following work up: Documented in chart, if applicable.    External consultation:    Discussion of management with another provider: Documented in chart, if applicable    Complicating factors:    Care impacted by chronic illness: Diabetes, Heart Disease, Hyperlipidemia and Hypertension    Care affected by social determinants of health: N/A    Disposition considerations: Discharge. No recommendations on prescription strength medication(s). See documentation for any additional details.    At the conclusion of the encounter I discussed the results of all of the tests and the disposition. The questions were answered. The patient or family acknowledged understanding and was agreeable with the care plan.       MEDICATIONS GIVEN IN THE EMERGENCY:  Medications   0.9% sodium chloride BOLUS (0 mLs Intravenous Stopped 3/21/23 1945)   0.9% sodium chloride BOLUS (0 mLs Intravenous Stopped 3/21/23 2035)         NEW PRESCRIPTIONS STARTED AT TODAY'S ER VISIT  Discharge Medication List as of 3/21/2023  9:54 PM             =================================================================    HPI    Patient information was obtained from: patient and daughter-in-law    Use of : Yes (In Person, daughter-in-law) - Language: Jennifer Paz is a 77 year old female with a pertinent history of DM2, hyperlipidemia, hypertension, CAD, NSTEMI, s/p CABG, and DDD who presents to this ED for evaluation of hyperglycemia.    The patient was seen by her primary yesterday for lab draws. She was supposed to go back in 1 week but her PCP called today because her A1C was 20 and her blood glucose was over 700. The patient states that she does not have any pain now but has chronic  weakness in her legs that makes it hard to walk and occasionally has flank pain. She does endorse frequent urination but this is not new. The patient checks her blood glucose 1x or 2x/day and it is usually 150-300. The patient says she has not changed anything recently that would cause this spike in blood glucose. However, the daughter-in-law is the one managing her medications and says that she is inconsistent in taking them and still has a poor diet. The patient quit taking insulin multiple years ago because she did not like it. Her PCP is aware of this and instead put her on Metformin and 2 other blood sugar medications.     The patient requests another bag of fluids but otherwise denies any other complaints at this time.          REVIEW OF SYSTEMS   Review of Systems   Genitourinary: Positive for frequency.   Neurological: Positive for weakness (bilateral lower extremities).   All other systems reviewed and are negative.       VITALS:  /60   Pulse 62   Temp 97.1  F (36.2  C) (Temporal)   Resp 16   Ht 1.524 m (5')   Wt 59 kg (130 lb)   SpO2 95%   BMI 25.39 kg/m      PHYSICAL EXAM    Constitutional: Well developed, well nourished. Comfortable appearing.  HENT: Normocephalic, atraumatic, mucous membranes moist, nose normal. Neck- Supple, gross ROM intact.   Eyes: Pupils mid-range, conjunctiva without injection, no discharge.   Respiratory: Clear to auscultation bilaterally, no respiratory distress, no wheezing, speaks full sentences easily. No cough.  Cardiovascular: Normal heart rate, regular rhythm, no murmurs.   GI: Soft, no tenderness to deep palpation in all quadrants, no masses.  Musculoskeletal: Moving all 4 extremities intentionally and without pain. No obvious deformity.  Skin: Warm, dry, no rash.  Neurologic: Alert & oriented x 3, cranial nerves grossly intact.  Psychiatric: Affect normal, cooperative.      Petey BESS am serving as a scribe to document services personally performed by  Dr. Edi Elias based on my observation and the provider's statements to me. I, Edi Elias MD attest that Petey Carlos is acting in a scribe capacity, has observed my performance of the services and has documented them in accordance with my direction.    Edi Elias M.D.  Emergency Medicine  [facility] EMERGENCY ROOM  [address] [facility] [id number]  [phone]  Dept: [phone]     Edi Elias MD  03/21/23 8768

## 2023-03-22 NOTE — PROGRESS NOTES
Preceptor Attestation:    I discussed the patient with the resident and evaluated the patient in person. I have verified the content of the note, which accurately reflects my assessment of the patient and the plan of care.   Supervising Physician:  José Miguel Barkley DO.

## 2023-03-22 NOTE — PROGRESS NOTES
Assessment and Plan      Type 2 diabetes mellitus with hyperglycemia, without long-term current use of insulin (H)  Chronic kidney disease, stage 2 (mild)  Presents for emergency room follow-up for very poorly controlled diabetes.  Patient has been taking invokana 300 mg daily and metformin 2000 mg daily and was found to have an A1c of greater than 20.1 and a blood glucose of 739 in clinic yesterday before presenting to the emergency department for further management.  Patient's electrolyte abnormalities improved IV fluids and she was discharged to follow-up today in clinic.  Patient previously was on insulin but stopped taking it as she did not like injections.  She is now amenable to re-starting insulin therapy.  Patient's daughter-in-law who is present at visit today with her as previously administered insulin and manages her medications.  Patient has supplies to check blood sugar at home and daughter-in-law feels confident in administering insulin correctly.  Discussed with patient starting her on relatively low-dose of long-acting insulin and having her follow-up on Monday, 3/27/2023 both with Dr. Landers as scheduled in addition to a visit with pharmacy for medication management and insulin initiation teaching.  Patient will need titration of insulin and would benefit from continuous glucose monitoring.  Labs including microalbumin and lipid panel up-to-date.  -     insulin glargine (LANTUS PEN) 100 UNIT/ML pen; Inject 10 Units Subcutaneous At Bedtime  -     Med Therapy Management Referral    Protein-calorie malnutrition, unspecified severity (H)  Poor appetite  Patient requests additional shakes supplement refill for her poor appetite and malnutrition.  -     Nutritional Supplements (GLUCERNA ADVANCE SHAKE) LIQD; Take 237 mLs by mouth 3 times daily (with meals)    Return in about 5 days (around 3/27/2023) for Follow up T2DM.    Patient was staffed with attending physician Dr. Filippo Bustamante MD  PGY1         HPI       Jenise Paz is a 77 year old year old female w/ PMH of   Patient Active Problem List   Diagnosis     Abnormal Pap smear of cervix     Cervical spondylosis     PTSD (post-traumatic stress disorder)     Hyperlipemia     Hypertension     Low back pain Chronic     DDD (degenerative disc disease), lumbar     Muscle Aches, Generalized (Myalgia)     Thyroid nodule     History of subtotal thyroidectomy     Vitamin D deficiency     Seasonal allergies     Noncompliance with medication regimen     Type 2 diabetes mellitus with hyperglycemia, without long-term current use of insulin (H)     Coronary artery disease involving native coronary artery of native heart with unstable angina pectoris (H)     Anemia, iron deficiency     S/P CABG (coronary artery bypass graft)     Functional urinary incontinence     Compression fracture of L1 vertebra with routine healing     Protein-calorie malnutrition, unspecified severity (H)     Acute non Q wave MI (myocardial infarction), initial episode of care (H)     Acute non-STEMI < 8 weeks prior, subsequent admission after initial     Cholecystitis     Presence of aortocoronary bypass graft     Microcytic anemia     Arteriosclerosis of coronary artery     Coronary artery disease involving native coronary artery with unstable angina pectoris (H)     Type 2 diabetes mellitus (H)     Hyperlipidemia     Essential hypertension     Chronic kidney disease, stage 2 (mild)     Patient presents for emergency department follow-up in which she was seen yesterday for hyperglycemia and uncontrolled type 2 diabetes.  Patient was seen in clinic yesterday and was noted to have an A1c of greater than 20.1 patient to blood sugar of 739.  Notably, her microalbumin was undetectable and her GFR was greater than 90 at that time.  Patient was contacted via phone and recommended to present to the emergency department for further evaluation.  In emergency department she was found to have  pseudohyponatremia 127 glucose of 488 and a potassium of 5.8.  These electrolytes corrected with IV fluids.  Patient was discharged with recommendation for close follow-up with primary care provider.    Today patient states she is still experiencing similar symptoms from yesterday including polyuria, polydipsia, and generalized weakness.  Patient states that she was previously on insulin but stopped taking insulin because she did not like injections.  Per chart review, patient was last on aspart 3 times a day in July 2022.  It appears that she is also on Lantus 40 units at night 2 years ago.  Given patient's uncontrolled diabetes and recent emergency department visit, patient is now amenable to restarting insulin therapy.  Patient's daughter-in-law who is with her previously administers her insulin for her and helps her manage her medications.  They state that they have adequate supply of lancets, test strips, and a glucose meter.    Patient has been taking Invokana 300 mg daily in addition to 2000 mg metformin daily.  She also is on a daily aspirin, lisinopril and Lipitor.  Patient denies recently checking her blood sugars at home.    She states that she recently had an eye exam for recent cataract surgery and will have a repeat exam at the end of this month.  She is due for a diabetic foot exam.  Patient states that she does minimal exercise as she has generalized weakness in her legs and uses a cane and/or walker for ambulation.  She also states that her appetite has been poor and she requests Glucerna shakes for nutrition today.    Patient requested that her daughter-in-law was present with her at the bedside and interprets for her.  Patient is Hmong speaking.    Urine Albumin:   Lab Results   Component Value Date    [facility]  03/20/2023      Comment:      Unable to calculate, urine albumin and/or urine creatinine is outside detectable limits.  Microalbuminuria is defined as an albumin:creatinine ratio of 17 to  299 for males and 25 to 299 for females. A ratio of albumin:creatinine of 300 or higher is indicative of overt proteinuria.  Due to biologic variability, positive results should be confirmed by a second, first-morning random or 24-hour timed urine specimen. If there is discrepancy, a third specimen is recommended. When 2 out of 3 results are in the microalbuminuria range, this is evidence for incipient nephropathy and warrants increased efforts at glucose control, blood pressure control, and institution of therapy with an angiotensin-converting-enzyme (ACE) inhibitor (if the patient can tolerate it).        Lab Results   Component Value Date    A1C >20.1 03/20/2023    A1C 12.1 10/20/2022    A1C >14.0 07/24/2022    A1C >14.0 08/30/2021    A1C >14.0 04/19/2021    A1C >14.0 03/01/2021    A1C 9.7 06/22/2020    A1C 10.2 02/03/2020    A1C 14.7 12/02/2019     BP Readings from Last 2 Encounters:   03/22/23 106/71   03/21/23 105/60     Hemoglobin A1C (%)   Date Value   03/20/2023 >20.1 (H)   10/20/2022 12.1 (H)   04/19/2021 >14.0 (H)   03/01/2021 >14.0 (H)     LDL Cholesterol Calculated (mg/dL)   Date Value   03/20/2023 53   03/01/2021 43   06/22/2020 50          Review of Systems:   10 point ROS negative other than as specified above.         Physical Exam:   /71 (BP Location: Left arm, Patient Position: Sitting, Cuff Size: Adult Regular)   Pulse 68   Temp 97.2  F (36.2  C) (Tympanic)   Resp 18   SpO2 96%      Exam:  Constitutional: Alert, no distress, and cooperative.  Head: Normocephalic. No masses, lesions, tenderness or abnormalities.  Neck: Neck supple. No adenopathy.  Cardiovascular: Regular rate and rhythm without audible murmur.  Respiratory: Lungs clear bilaterally and without wheezing, crackles or rhonchi. Breathing comfortably on room air.  Gastrointestinal: Abdomen soft, non-tender. Bowel sounds normal.  Musculoskeletal: Extremities normal and without no gross deformities. Muscle tone normal. Walker in  hand.  Skin: No suspicious lesions or rashes.  Neurologic: Grossly normal cranial nerves. Normal strength, sensation and tone.  Psychiatric: Mentation appears normal and affect normal/bright.    No testing ordered today    ----- Service Performed and Documented by Resident or Fellow ------

## 2023-03-22 NOTE — PROGRESS NOTES
Called by ED requesting assistance with close follow up scheduling after discharging from ED. Patient presented with hyperglycemia, was sent over from clinic by Dr Zepeda. ED stabilized and deemed safe to discharge home with close follow up and patient reports ready to resume insulin. I was able to schedule her with Dr Bustamante 3/22/23 at 0800.     Rafaela Garcia MD

## 2023-03-22 NOTE — PATIENT INSTRUCTIONS
Thank you for coming into clinic today!     your prescriptions from the pharmacy  Schedule appointment at  for pharmacy visit on Monday 3/27/23 in addition to appt with Dr. Landers  Record your blood sugar levels and bring on Monday  Call [facility] at [phone] with questions or concerns    Take care,  Katie Bustamante MD

## 2023-03-22 NOTE — ED NOTES
Pt and family member agree with discharge instructions. Will follow up with clinic at 8am tomorrow morning. No other questions at this time.

## 2023-04-01 NOTE — PROGRESS NOTES
Medication Therapy Management (MTM) Encounter    ASSESSMENT:                            Medication Adherence/Access: Significant issues with adherence with insulin but it is due to patient declining to take her insulin when her family tells her to take it and she only has been taking it half the time.    Diabetes:    Uncontrolled per fasting blood sugars. Discussed CGM but she declined.  Dr. Landers and I had a talk with her about diabetes, and that her symptoms are not from the medicines but from uncontrolled diabetes. Discussed how elevated her blood sugars are, what her goal sugars are, and that her symptoms will improve with better control.  Since she said she would take either Lantus or oral medicines for diabetes but not both, discussed with her that if she stopped her oral medications she would need to be on insulin 4 times daily (Lantus plus 3 times daily short-acting), but if she took her oral diabetes pills also, she could just take Lantus once daily. She reluctantly agreed to take both. Also educated her on the importance of taking Lantus every day and not intentionally skipping it. She endorsed that she would take it every day now. She would also benefit from increasing her Lantus dose as her blood sugars are significantly elevated.    Hypertension:   Goal BP < 130/80; at goal. Hypotensive today in clinic and she has been symptomatic at home every day, with low readings with SBP around 100. Although lisinopril is beneficial for her CAD/NSTEMI and diabetes/kidneys, it is prudent to stop the lisinopril for now.    Hyperlipidemia:   Considered very high risk with her cardiac history. Appropriately on high intensity statin and LDL is at goal of < 70.    CAD/NSTEMI/CABG/angina: She would benefit from restarting aspirin and taking it every day. Educated patient and daughter in law on importance of taking it every day.    Compression fracture of L1 vertebrae/osteopenia/vitamin D deficiency:  She would benefit  from restarting her weekly alendronate. Re-educated on proper administration technique. If she starts her Glucerna shakes she will get some calcium/vitamin D in her diet.     Chronic low back pain:  Stable       Heartburn/poor appetite: She would benefit from taking the prescribed famotidine. I resent the prescription.    Allergic rhinitis: Daughter in law requesting prescribed cetirizine so she doesn't have to purchase over the counter. Sent Rx.    PLAN:                              Medication reconciliation completed and EHR med list updated accordingly    STOP the lisinopril 2.5mg for now; do not throw it away    Increase Lantus to 20 units daily and take this injection every day    On April 11, if no blood sugars in the week before are less than 180, then increase Lantus to 24 units daily    Restart taking the alendronate medicine for bones every week- first thing in the morning with water only and do not eat, drink or take any medicines or lay back down for 30 minutes.    Take ASA 81mg every day    Take famotidine if needed for heartburn/appetitie    I sent a prescription for cetirizine/Zyrtec to the pharmacy    Follow-up: 4/17 with Dr De Leon (appt made); 1 month with Dr. Landers (daughter in law will make appt)      Medication issues to be addressed at a future visit:      Continue to titrate Lantus    GLP-1 would be a good choice for her diabetes with CAD, but with her poor appetite as well as not wanting to take any more injections than her once daily Lantus, may not be the best choice for now. In the future, could add pioglitazone as she is ok with oral medications.    Repeat DEXA    Recheck vitamin D; Add vitamin D supplement if vit D level still low    SUBJECTIVE/OBJECTIVE:                          Jenise Paz is a 77 year old female seen for an initial visit. She was referred to me from Dr. Landers. Today's visit is a co-visit with Dr Landers. Patient was accompanied by her daughter in law. Professional  " (JOSSIE) assisted with this visit.    Reason for visit: Initial Medication Therapy Management.    Allergies/ADRs: Reviewed in chart  Past Medical History: Reviewed in chart  Social History     Tobacco Use     Smoking status: Never     Passive exposure: Never     Smokeless tobacco: Never     Tobacco comments:     Children smoke outside of the home   Vaping Use     Vaping status: Never Used   Substance Use Topics     Alcohol use: No     Drug use: No     Tobacco: She reports that she has never smoked. She has never been exposed to tobacco smoke. She has never used smokeless tobacco.  ^Reviewed today    Medication Adherence/Access: Her daughter in law sets up her meds in a pill box and she takes meds twice daily. She does not miss her pills but skips her insulin about every other day. Discussed that all except gabapentin are once daily and she could take all meds in the morning then just gabapentin in the evening but her daughter in law said she prefers to not take so many pills all at once and would rather split them into two different administration times.    Diabetes  Type 2 Diabetes:  Currently prescribed Lantus 10 units daily, Invokana 300mg daily, metformin ER 2000mg daily. After ED visit with blood sugars in the 700s and A1C >20, she was started on insulin at 3/22 visit (Lantus 10 units daily). Her daughter in law, who was present, gives it to her and did not require education as she had given it before.  Her daughter in law endorses that the patient declines her insulin about every other day. She thinks she does not need insulin if she takes pills for diabetes and thinks it is \"too much\". Therefore, she has only taken her Lantus about half the time since it was started 1.5 weeks ago. She states that she will take either the pills or the insulin but will not take both because it is \"too much\".  She doesn't believe she actually has diabetes but thinks when she had surgery years ago, they implanted " "something into her neck that gave her diabetes. Her daughter in law states she has been educated many, many times about this and her disease and that her mom still has these thoughts, and she thinks that the burning/tingling (neuropathy) she has is from that and not from the diabetes.   Blood sugar monitoring: one time(s) daily fasting. Ranges: Higher than 500 this morning (after rice); since last visit when Lantus was started, fasting blood sugars have been in the high 200s-400s, and the reading this morning >500 was highest. These numbers are based on her recall and she did not bring her meter with her today.     Symptoms of low blood sugar? no  Symptoms of high blood sugar? Yes: thirsty, drinking a lot of water, burning/tingling/\"shocks\" in feet  Diet/Exercise: not discussed today  Aspirin: Taking 81mg daily for secondary prevention   Statin: Yes: atorvastatin 80mg daily   ACEi/ARB: Yes: lisinopril 2.5mg daily.   Urine Albumin:   Lab Results   Component Value Date    [facility]  03/20/2023      Comment:      Unable to calculate, urine albumin and/or urine creatinine is outside detectable limits.  Microalbuminuria is defined as an albumin:creatinine ratio of 17 to 299 for males and 25 to 299 for females. A ratio of albumin:creatinine of 300 or higher is indicative of overt proteinuria.  Due to biologic variability, positive results should be confirmed by a second, first-morning random or 24-hour timed urine specimen. If there is discrepancy, a third specimen is recommended. When 2 out of 3 results are in the microalbuminuria range, this is evidence for incipient nephropathy and warrants increased efforts at glucose control, blood pressure control, and institution of therapy with an angiotensin-converting-enzyme (ACE) inhibitor (if the patient can tolerate it).        Lab Results   Component Value Date    A1C >20.1 03/20/2023    A1C 12.1 10/20/2022    A1C >14.0 07/24/2022    A1C >14.0 08/30/2021    A1C >14.0 04/19/2021 " "   A1C >14.0 03/01/2021    A1C 9.7 06/22/2020    A1C 10.2 02/03/2020    A1C 14.7 12/02/2019     Most Recent Immunizations   Administered Date(s) Administered     COVID-19 Vaccine 18+ (Moderna) 08/19/2021     FLU 6-35 months 02/18/2014     Flu, Unspecified 09/23/2011     Influenza (High Dose) 3 valent vaccine 12/02/2019     Influenza (IIV3) PF 02/18/2014     Influenza Vaccine 65+ (Fluzone HD) 09/12/2022     Influenza Vaccine, 6+MO IM (QUADRIVALENT W/PRESERVATIVES) 10/23/2018     Pneumo Conj 13-V (2010&after) 10/23/2018     Pneumococcal 23 valent 02/18/2014     TD,PF 7+ (Tenivac) 06/22/2005     TDAP Vaccine (Boostrix) 02/18/2014     Td (Adult), Adsorbed 06/22/2005        Hypertension:  Currently taking lisinopril 2.5mg daily (last filled 9/2022 3 month supply but has with her today and states she is taking it), metoprolol ER 25mg daily (also for CAD/MI). She has been having dizziness and lightheadedness nearly every day at home.  Monitoring home BP?: yes  Home BP (if applicable): Top number is 100 to just slightly above 100 and daughter in law is concerned.  BP Readings from Last 6 Encounters:   04/03/23 (!) 84/49   03/22/23 106/71   03/21/23 105/60   03/20/23 90/51   09/12/22 104/62   08/12/22 93/89        Hyperlipidemia:   Currently taking atorvastatin 80mg daily. Patient is not experiencing side effects.  Recent Labs   Lab Test 03/20/23  1645 03/01/21  1647 06/22/20  1139 03/26/19  1559   CHOL 128 115 115.9 168.2   HDL 45* 44* 43.4 33.8*   LDL 53 43 50 100   TRIG 152*  --  111.6 170.7*   CHOLHDLRATIO  --   --  2.7 5.0     The ASCVD Risk score (Xin ESPARZA, et al., 2019) failed to calculate for the following reasons:    The patient has a prior MI or stroke diagnosis    CAD/NSTEMI/CABG/angina:   Currently taking metoprolol ER 25mg daily, lisinopril 2.5mg daily, imdur 15mg daily (half a 30mg tablet), SL NTG as needed (has not needed in a long time). She is prescribed aspirin 81mg daily but thought it was just \"as " "needed\" so hasn't been taking it much. Patient is not experiencing side effects except from lisinopril (see above under Hypertension).    Compression fracture of L1 vertebrae/osteopenia/vitamin D deficiency:   Prescribed taking alendronate 70mg weekly (last filled April 2022 3 month supply). She endorsed forgetting this a lot since it's once weekly, and daughter in law forgets to give it to her a lot as well. She agreed it was about right how frequently she was forgetting, that it was last filled 1 year ago for a 3 month supply. She understands how to take correctly, on empty stomach 30 min prior to any food/drink/meds.   No calcium/vitamin D supplements. However, is prescribed Glucerna Advance shakes three times daily and these contain 250mg calcium per serving, so she is getting 750mg daily through those. They also contain vitamin D3 (60% recommended daily dose; but no exact amount is provided on label). She has not started these yet, and isn't sure why she didn't get them from the pharmacy (Possibly not covered).    Had osteopenia in 1/2019, prior to her fracture    DEXA results 1/17/2019:  1. The spine bone density L1-L4 with T-score -2.4.  2. Femoral bone densities show left total hip T- score -2.4 and right femoral neck T-score -2.1.    Most recent Vitamin D:  2 years ago: 9.5 ng/mL    Chronic low back pain:   Currently taking acetaminophen 500mg 2 tablets 3 times daily as needed (using about once every other day or so), cyclobenzaprine 5mg nightly as needed (using about once weekly), gabapentin 300mg twice daily. Patient is not experiencing side effects.    Heartburn/poor appetite:  Currently prescriped famotidine 20mg twice daily as needed. Not taking; doesn't have any; she isn't sure why she doesn't have any at home.    Seasonal allergies:   Currently taking cetirizine 10mg daily as needed. Uses spring/summer. She has been purchasing OTC and is asking for a prescription.    BP Readings from Last 1 " "Encounters:   04/03/23 (!) 84/49     Pulse Readings from Last 1 Encounters:   04/03/23 73     Wt Readings from Last 1 Encounters:   04/03/23 132 lb 3.2 oz (60 kg)     Ht Readings from Last 1 Encounters:   04/03/23 4' 11\" (1.499 m)     Estimated body mass index is 26.7 kg/m  as calculated from the following:    Height as of an earlier encounter on 4/3/23: 4' 11\" (1.499 m).    Weight as of an earlier encounter on 4/3/23: 132 lb 3.2 oz (60 kg).    Temp Readings from Last 1 Encounters:   04/03/23 98.5  F (36.9  C) (Oral)       ----------------      I spent 40 minutes with this patient today. Dr. Landers was provided the recommendations above in clinic today and Dr. Garrison is the authorizing prescriber for this visit through the pharmacist collaborative practice agreement. A copy of the visit note was provided to the patient's provider(s).    A summary of these recommendations was given to the patient (see AVS from today's appointment with Dr Landers).    Beth De Leon, Pharm.D.         Medication Therapy Recommendations  Essential hypertension    Current Medication: lisinopril (ZESTRIL) 2.5 MG tablet (Discontinued)   Rationale: Undesirable effect - Adverse medication event - Safety   Recommendation: Discontinue Medication   Status: Accepted per Provider         S/P CABG (coronary artery bypass graft)    Current Medication: aspirin (ASA) 81 MG EC tablet   Rationale: Synergistic therapy - Needs additional medication therapy - Indication   Recommendation: Start Medication   Status: Accepted per CPA         Type 2 diabetes mellitus with hyperglycemia, without long-term current use of insulin (H)    Current Medication: insulin glargine (LANTUS PEN) 100 UNIT/ML pen   Rationale: Dose too low - Dosage too low - Effectiveness   Recommendation: Increase Dose   Status: Accepted per CPA          Current Medication: insulin glargine (LANTUS PEN) 100 UNIT/ML pen   Rationale: Does not understand instructions - Adherence - Adherence "   Recommendation: Provide Education   Status: Patient Agreed - Adherence/Education

## 2023-04-03 ENCOUNTER — OFFICE VISIT (OUTPATIENT)
Dept: PHARMACY | Facility: CLINIC | Age: 78
End: 2023-04-03
Payer: COMMERCIAL

## 2023-04-03 ENCOUNTER — OFFICE VISIT (OUTPATIENT)
Dept: FAMILY MEDICINE | Facility: CLINIC | Age: 78
End: 2023-04-03
Payer: COMMERCIAL

## 2023-04-03 VITALS
SYSTOLIC BLOOD PRESSURE: 84 MMHG | TEMPERATURE: 98.5 F | BODY MASS INDEX: 26.65 KG/M2 | HEIGHT: 59 IN | RESPIRATION RATE: 20 BRPM | OXYGEN SATURATION: 97 % | WEIGHT: 132.2 LBS | HEART RATE: 73 BPM | DIASTOLIC BLOOD PRESSURE: 49 MMHG

## 2023-04-03 DIAGNOSIS — Z79.4 TYPE 2 DIABETES MELLITUS WITH DIABETIC POLYNEUROPATHY, WITH LONG-TERM CURRENT USE OF INSULIN (H): Primary | ICD-10-CM

## 2023-04-03 DIAGNOSIS — I10 ESSENTIAL HYPERTENSION: ICD-10-CM

## 2023-04-03 DIAGNOSIS — S32.010D COMPRESSION FRACTURE OF L1 VERTEBRA WITH ROUTINE HEALING: ICD-10-CM

## 2023-04-03 DIAGNOSIS — E78.5 HYPERLIPIDEMIA, UNSPECIFIED HYPERLIPIDEMIA TYPE: ICD-10-CM

## 2023-04-03 DIAGNOSIS — I25.110 CORONARY ARTERY DISEASE INVOLVING NATIVE CORONARY ARTERY OF NATIVE HEART WITH UNSTABLE ANGINA PECTORIS (H): ICD-10-CM

## 2023-04-03 DIAGNOSIS — R63.0 POOR APPETITE: ICD-10-CM

## 2023-04-03 DIAGNOSIS — E11.65 TYPE 2 DIABETES MELLITUS WITH HYPERGLYCEMIA, WITHOUT LONG-TERM CURRENT USE OF INSULIN (H): Primary | ICD-10-CM

## 2023-04-03 DIAGNOSIS — Z95.1 S/P CABG (CORONARY ARTERY BYPASS GRAFT): ICD-10-CM

## 2023-04-03 DIAGNOSIS — E11.42 TYPE 2 DIABETES MELLITUS WITH DIABETIC POLYNEUROPATHY, WITH LONG-TERM CURRENT USE OF INSULIN (H): Primary | ICD-10-CM

## 2023-04-03 DIAGNOSIS — M85.89 OSTEOPENIA OF MULTIPLE SITES: ICD-10-CM

## 2023-04-03 DIAGNOSIS — G89.29 CHRONIC BILATERAL LOW BACK PAIN WITHOUT SCIATICA: ICD-10-CM

## 2023-04-03 DIAGNOSIS — M54.50 CHRONIC BILATERAL LOW BACK PAIN WITHOUT SCIATICA: ICD-10-CM

## 2023-04-03 DIAGNOSIS — J30.2 SEASONAL ALLERGIES: ICD-10-CM

## 2023-04-03 DIAGNOSIS — J30.89 SEASONAL ALLERGIC RHINITIS DUE TO OTHER ALLERGIC TRIGGER: ICD-10-CM

## 2023-04-03 PROCEDURE — 99605 MTMS BY PHARM NP 15 MIN: CPT | Performed by: PHARMACIST

## 2023-04-03 PROCEDURE — 99607 MTMS BY PHARM ADDL 15 MIN: CPT | Performed by: PHARMACIST

## 2023-04-03 PROCEDURE — 99214 OFFICE O/P EST MOD 30 MIN: CPT | Mod: GC

## 2023-04-03 RX ORDER — ALENDRONATE SODIUM 70 MG/1
70 TABLET ORAL
Qty: 12 TABLET | Refills: 4 | Status: SHIPPED | OUTPATIENT
Start: 2023-04-03 | End: 2024-08-02

## 2023-04-03 RX ORDER — FAMOTIDINE 20 MG/1
20 TABLET, FILM COATED ORAL 2 TIMES DAILY PRN
Qty: 60 TABLET | Refills: 11 | Status: SHIPPED | OUTPATIENT
Start: 2023-04-03 | End: 2023-04-04

## 2023-04-03 RX ORDER — CETIRIZINE HYDROCHLORIDE 10 MG/1
10 TABLET ORAL DAILY PRN
Qty: 90 TABLET | Refills: 3 | Status: SHIPPED | OUTPATIENT
Start: 2023-04-03 | End: 2024-07-20

## 2023-04-03 NOTE — LETTER
April 3, 2023  Jenise Paz  [address] [place] [address]    Dear MARI Garcia [facility]     Thank you for talking with me on Apr 3, 2023 about your health and medications. As a follow-up to our conversation, I have included two documents:      Your Recommended To-Do List has steps you should take to get the best results from your medications.  Your Medication List will help you keep track of your medications and how to take them.    If you want to talk about these documents, please call Beth De Leon RPH at phone: [phone], Monday-Friday 8-4:30pm.    I look forward to working with you and your doctors to make sure your medications work well for you.    Sincerely,  Beth De Leon RPH  [facility] Pharmacist, [facility]

## 2023-04-03 NOTE — LETTER
_  Medication List        Prepared on: Apr 3, 2023     Bring your Medication List when you go to the doctor, hospital, or   emergency room. And, share it with your family or caregivers.     Note any changes to how you take your medications.  Cross out medications when you no longer use them.    Medication How I take it Why I use it Prescriber   ACETAMINOPHEN EXTRA STRENGTH 500 MG tablet TAKE 2 TABLETS BY MOUTH THREE TIMES DAILY AS NEEDED Chronic left-sided low back pain with left-sided sciatica Afia Cortez MD   alendronate (FOSAMAX) 70 MG tablet Take 1 tablet (70 mg) by mouth every 7 days Compression fracture of L1 vertebra with routine healing; Osteopenia of multiple sites James Garrison MD   aspirin (ASA) 81 MG EC tablet Take 1 tablet (81 mg) by mouth daily Coronary artery disease involving native coronary artery of native heart with unstable angina pectoris (H) James Garrison MD   atorvastatin (LIPITOR) 80 MG tablet TAKE 1 TABLET(80 MG) BY MOUTH DAILY Hypercholesteremia Kayli Maxwell MD   cetirizine (ZYRTEC) 10 MG tablet Take 1 tablet (10 mg) by mouth daily as needed Seasonal allergic rhinitis due to other allergic trigger James Garrison MD   CONTOUR NEXT TEST test strip TEST BLOOD SUGAR TWICE DAILY OR AS DIRECTED Type 2 diabetes mellitus without complication, without long-term current use of insulin (H) Kayli Maxwell MD   cyclobenzaprine (FLEXERIL) 5 MG tablet Take 1 tablet (5 mg) by mouth nightly as needed for muscle spasms or other (back pain) Lumbar Pain Judith Wood MD   famotidine (PEPCID) 20 MG tablet Take 1 tablet (20 mg) by mouth 2 times daily as needed (poor appetite) Poor Appetite James Garrison MD   gabapentin (NEURONTIN) 300 MG capsule TAKE 1 CAPSULE(300 MG) BY MOUTH TWICE DAILY Chronic left-sided low back pain with left-sided sciatica Kayli Maxwell MD   insulin glargine (LANTUS PEN) 100 UNIT/ML pen Inject 20 Units Subcutaneous At Bedtime Type 2 diabetes mellitus with  hyperglycemia, without long-term current use of insulin (H) James Garrison MD   INVOKANA 300 MG tablet TAKE 1 TABLET(300 MG) BY MOUTH EVERY MORNING BEFORE BREAKFAST Type 2 diabetes mellitus with hyperglycemia, without long-term current use of insulin (H) Kayli Maxwell MD   isosorbide mononitrate (IMDUR) 30 MG 24 hr tablet TAKE 1/2 TABLET( 15MG) BY MOUTH EVERY DAY Coronary artery disease involving native heart with angina pectoris, unspecified vessel or lesion type (H) Kayli Maxwell MD   lisinopril (ZESTRIL) 2.5 MG tablet [DISCONTINUED]   Coronary artery disease involving native coronary artery of native heart with unstable angina pectoris (H) Kayli Maxwell MD   metFORMIN (GLUCOPHAGE XR) 500 MG 24 hr tablet TAKE 4 TABLETS BY MOUTH EVERY DAY WITH FOOD Type 2 diabetes mellitus without complication, without long-term current use of insulin (H) Kayli Maxwell MD   metoprolol succinate ER (TOPROL XL) 25 MG 24 hr tablet TAKE 1 TABLET BY MOUTH EVERY DAY Coronary artery disease involving native heart with angina pectoris, unspecified vessel or lesion type (H) Kayli Maxwell MD   Microlet Lancets MISC USE TO TEST TWICE DAILY OR AS DIRECTED. Type 2 diabetes mellitus without complication, without long-term current use of insulin (H) Afia Cortez MD   nitroGLYcerin (NITROSTAT) 0.4 MG sublingual tablet Place 1 tablet (0.4 mg) under the tongue every 5 minutes as needed for chest pain Coronary artery disease involving native coronary artery of native heart with unstable angina pectoris (H) Afia Cortez MD   Nutritional Supplements (GLUCERNA ADVANCE SHAKE) LIQD Take 237 mLs by mouth 3 times daily (with meals) Protein-calorie malnutrition, unspecified severity (H); Poor Appetite José Miguel Barkley, DO         Add new medications, over-the-counter drugs, herbals, vitamins, or  minerals in the blank rows below.    Medication How I take it Why I use it Prescriber                                      Allergies:       nka [no known allergies]        Side effects I have had:               Other Information:              My notes and questions:

## 2023-04-03 NOTE — PROGRESS NOTES
Preceptor Attestation:    I discussed the patient with the resident and evaluated the patient in person. I have verified the content of the note, which accurately reflects my assessment of the patient and the plan of care.   Supervising Physician:  Ceferino Russo MD.

## 2023-04-03 NOTE — PATIENT INSTRUCTIONS
STOP the lisinopril 2.5mg for now; do not throw it away  Increase Lantus to 20 units daily and take this injection every day  On April 11, if no blood sugars in the week before are less than 180, then increase Lantus to 24 units daily  Restart taking the alendronate medicine for bones every week- first thing in the morning with water only and do not eat, drink or take any medicines or lay back down for 30 minutes.  Take ASA 81mg every day  Take famotidine if needed for heartburn/appetitie  I sent a prescription for cetirizine/Zyrtec to the pharmacy

## 2023-04-03 NOTE — PROGRESS NOTES
Assessment & Plan     Type 2 diabetes mellitus with diabetic polyneuropathy, with long-term current use of insulin (H)  Patient has A1c greater than 20 and was recently seen in the emergency department following a glucose of 739 in clinic.  She has been taking Invokana 300 mg daily and metformin 2000 mg daily.  At her last clinic visit on 3/22 she was started on 10 units of Lantus which she has been taking about every other day.  She does not like doing the injections and does not feel that the injections are necessary given that she is on other medications for diabetes.  She has significant bilateral diabetic neuropathy in her legs.  She presents with her daughter who states that Jenise believes that her diabetes is from an implanted device placed during one of her operations.  Currently only checking glucose once in the morning and has had numbers greater than 500 and no readings less than 279.  Diabetic foot exam with normal proprioception and no skin lesions.  We will increase her Lantus to 20 units daily with instructions to increase to 24 units daily if no blood sugars are less than 180.  Discussed care with the pharmacy team in clinic.  -Increase Lantus to 20 units daily  - Continue Invokana 300 mg daily and metformin 2000 mg daily  - Continue checking your glucose once daily  - Follow-up in 1 month    Chronic hypotension  Patient's blood pressure today 84/49.  Patient chronically has low blood pressure but this is extremely low when she started to have symptoms of dizziness/lightheadedness.  She is on lisinopril 2.5 mg for kidney protection with diabetes, however, given that her low blood pressure and being symptomatic we will discontinue at this time.  Also on metoprolol but has normal heart rate and this is likely not contributing to the hypotension.  - Discontinue lisinopril 2.5 mg daily    History of compression fracture of L1 vertebra  Osteopenia  Vitamin D deficiency  - Restart weekly  "alendronate      No follow-ups on file.    Esvin Landers MD  [facility] LULA Barrett Mai is a 77 year old, presenting for the following health issues:  Diabetes, Medication Reconciliation (Did not review med, seeing pharm D), and Imm/Inj (COVID-19 VACCINE)        4/3/2023     1:46 PM   Additional Questions   Roomed by Shari   Accompanied by patient(self) and family member     HPI     Diabetes Follow-up    How often are you checking your blood sugar? One time daily  What time of day are you checking your blood sugars (select all that apply)?  Before meals  Have you had any blood sugars above 200?  Yes 279-500  Have you had any blood sugars below 70?  No    What concerns do you have today about your diabetes? Blood sugar is often over 200     Do you have any of these symptoms? (Select all that apply)  Numbness in feet, Burning in feet and Excessive thirst      BP Readings from Last 2 Encounters:   04/03/23 (!) 84/49   03/22/23 106/71     Hemoglobin A1C (%)   Date Value   03/20/2023 >20.1 (H)   10/20/2022 12.1 (H)   04/19/2021 >14.0 (H)   03/01/2021 >14.0 (H)     LDL Cholesterol Calculated (mg/dL)   Date Value   03/20/2023 53   03/01/2021 43   06/22/2020 50         Review of Systems   Constitutional, HEENT, cardiovascular, pulmonary, gi and gu systems are negative, except as otherwise noted.      Objective    BP (!) 84/49 (BP Location: Left arm, Patient Position: Sitting, Cuff Size: Adult Regular)   Pulse 73   Temp 98.5  F (36.9  C) (Oral)   Resp 20   Ht 1.499 m (4' 11\")   Wt 60 kg (132 lb 3.2 oz)   SpO2 97%   BMI 26.70 kg/m    Body mass index is 26.7 kg/m .  Physical Exam   GENERAL: healthy, alert and no distress  RESP: lungs clear to auscultation - no rales, rhonchi or wheezes  CV: regular rate and rhythm, normal S1 S2, no S3 or S4, no murmur, click or rub, no peripheral edema and peripheral pulses strong  Diabetic foot exam: no trophic changes or ulcerative lesions and normal " sensory exam    ----- Service Performed and Documented by Resident or Fellow ------

## 2023-04-04 RX ORDER — FAMOTIDINE 20 MG/1
TABLET, FILM COATED ORAL
Qty: 180 TABLET | Refills: 1 | Status: SHIPPED | OUTPATIENT
Start: 2023-04-04 | End: 2024-08-02

## 2023-04-07 ENCOUNTER — DOCUMENTATION ONLY (OUTPATIENT)
Dept: FAMILY MEDICINE | Facility: CLINIC | Age: 78
End: 2023-04-07
Payer: COMMERCIAL

## 2023-04-07 NOTE — PROGRESS NOTES
To be completed in Nursing note:    Please reference list for forms that require a visit for completion.  Please remind patients that providers are given 3-5 business days to complete and return forms.      Form type: [employer] ~ GLOVES:   GLOVES, VINYL (PF) -SMALL   GLOVES, VINYL (PF) -SMALL    Date form received: 4/4/2023    Date form completed by Physician: 4/21/2023    How was form returned to patient (mailed, faxed, or at  for patient to ):    Fax to [phone]    Date form mailed/faxed/left at  for patient and sent to HIM for scanning:    Fax on 4/21/2023    Once form is left for patient, faxed, or mailed PCS will then close the documentation only encounter.

## 2023-04-16 NOTE — PROGRESS NOTES
"Medication Therapy Management (MTM) Encounter    ASSESSMENT:                            Medication Adherence/Access: Improved. Taking meds and insulin daily now.    Diabetes:   Uncontrolled per home blood sugars. She would benefit from increasing Lantus dose. She at first did not want to take any more medicines or injections but discussed a once weekly injection that not only would help her blood sugars but be good for her heart with her history of CAD. After initial reluctance, she agreed to \"try\" it but could not guarantee she would stay on it.  Educated daughter on proper use of the Trulicity injection using a demo pen; she will administer the injections for the patient.    Hypertension:   BP still on low end after stopping lisinopril. She is taking metoprolol ER 25mg daily for her CAD but this would have minimal effect on BP. Not on any other antihypertensives. SGLT2i may have a slight decrease in BP.    Compression fracture of L1 vertebrae/osteopenia/vitamin D deficiency:    Not taking Glucerna. She would benefit from vitamin D supplementation as she has had a very low vitamin D level previously. She is getting a good amount of calcium from her milk. Even by decreasing to 8 oz instead of 16 oz 3 times daily, she will still be getting 300mg calcium 3 times daily with her milk.     CAD/NSTEMI/CABG/angina: Stable      PLAN:                              Medication reconciliation completed and EHR med list updated accordingly    Increase Lantus to 26 units daily    START Trulicity 0.75mg once weekly for 4 weeks, then increase to 1.5mg weekly    Decrease milk to a half glass 3 times daily    Start vitamin D 2000 units 1 tablet once daily    Try to do even small periods of time on your exercise bike    Follow-up: May 8 at 2:30pm with Dr. Zepeda and June 5 at 2:10pm with Dr. De Leon       Medication issues to be addressed at a future visit:      Assess low BP- informed Dr. Zepeda and Dr. Maxwell    Titrate Lantus and " Trulicity    Discuss further diet goals- decreasing rice and fruit    Assess use of exercise bike    SUBJECTIVE/OBJECTIVE:                          Jenise Paz is a 77 year old female seen for a follow-up visit. She was referred to me from Dr. Maxwell. Today's visit is a follow-up MTM visit from 4/3/23. Accompanied by daughter in law Cintron, who served as an . She manages her meds and gives her her insulin.    Reason for visit: Follow up Medication Therapy Management.    Allergies/ADRs: Reviewed in chart  Past Medical History: Reviewed in chart  Social History     Tobacco Use     Smoking status: Never     Passive exposure: Never     Smokeless tobacco: Never     Tobacco comments:     Children smoke outside of the home   Vaping Use     Vaping status: Never Used   Substance Use Topics     Alcohol use: No     Drug use: No     Tobacco: She reports that she has never smoked. She has never been exposed to tobacco smoke. She has never used smokeless tobacco.  ^Reviewed today    Medication Adherence/Access: Her daughter in  sets up her meds in a pill box and she takes meds twice daily. She does not miss her pills but skips her insulin about every other day.    Diabetes  Type 2 Diabetes:  Currently taking Invokana 300mg daily, metformin ER 2000mg daily, Lantus (20 units daily). At our last visit on 4/3, she had been taking her Lantus only every other day (10 units) and had stated she would only take pills or insulin and not both. We had a long discussion at that time and explained the risks to her if she did not take insulin, and that if she took her insulin regularly we may be able to avoid her needing to take insulin 4 times per day, so she had reluctantly agreed. She now has been taking her Lantus and oral medications every day. Patient is not experiencing side effects.  Blood sugar monitoring: one time(s) daily fasting (fingerstick). Ranges:  14 day average is 344  Starting this morning and going backwards in  dates:  148 this morning  210  241  216  228  200  253  152  537 (10pm)  292  HI (afternoon)  303  516 (after breakfast)  124  557 (8pm)  HI (afternoon)  185  HI (evening)    Symptoms of low blood sugar? Yes, sometimes gets episodes of feeling shaky/dizzy/lightheaded. Also endorsed feeling tired every day.  Symptoms of high blood sugar? sometimes  Diet/Exercise: Drinks 16 oz of milk 3 times per day every day. Either skim or whole. Eats a lot of rice- a large pile of rice once per day. Eats a lot of fruit. Puts sugar on vegetables sometimes. Doesn't really eat meat b/c she can't chew it. She has an exercise bike at home but says it hurts because her legs are weak.  Aspirin: Taking 81mg daily for secondary prevention   Statin: Yes: atorvastatin 80mg daily   ACEi/ARB: no: lisinopril 2.5mg daily was stopped due to hypotension.   Urine Albumin:   Lab Results   Component Value Date    UMALCR  03/20/2023      Comment:      Unable to calculate, urine albumin and/or urine creatinine is outside detectable limits.  Microalbuminuria is defined as an albumin:creatinine ratio of 17 to 299 for males and 25 to 299 for females. A ratio of albumin:creatinine of 300 or higher is indicative of overt proteinuria.  Due to biologic variability, positive results should be confirmed by a second, first-morning random or 24-hour timed urine specimen. If there is discrepancy, a third specimen is recommended. When 2 out of 3 results are in the microalbuminuria range, this is evidence for incipient nephropathy and warrants increased efforts at glucose control, blood pressure control, and institution of therapy with an angiotensin-converting-enzyme (ACE) inhibitor (if the patient can tolerate it).        Lab Results   Component Value Date    A1C >20.1 03/20/2023    A1C 12.1 10/20/2022    A1C >14.0 07/24/2022    A1C >14.0 08/30/2021    A1C >14.0 04/19/2021    A1C >14.0 03/01/2021    A1C 9.7 06/22/2020    A1C 10.2 02/03/2020    A1C 14.7 12/02/2019      Most Recent Immunizations   Administered Date(s) Administered     COVID-19 Vaccine 18+ (Moderna) 08/19/2021     FLU 6-35 months 02/18/2014     Flu, Unspecified 09/23/2011     Influenza (High Dose) 3 valent vaccine 12/02/2019     Influenza (IIV3) PF 02/18/2014     Influenza Vaccine 65+ (Fluzone HD) 09/12/2022     Influenza Vaccine, 6+MO IM (QUADRIVALENT W/PRESERVATIVES) 10/23/2018     Pneumo Conj 13-V (2010&after) 10/23/2018     Pneumococcal 23 valent 02/18/2014     TD,PF 7+ (Tenivac) 06/22/2005     TDAP Vaccine (Boostrix) 02/18/2014     Td (Adult), Adsorbed 06/22/2005        Hypertension:  Currently taking no meds. Lisinopril 2.5mg was stopped at our last visit due to hypotension.     BP Readings from Last 3 Encounters:   04/17/23 (!) 82/53   04/03/23 (!) 84/49   03/22/23 106/71       Compression fracture of L1 vertebrae/osteopenia/vitamin D deficiency:   Currently taking alendronate 70mg weekly (restarted at last MTM visit 4/3/23). She did restart this and is following the administration directions.   Patient is not experiencing side effects.  No calcium/vitamin D supplements. She tried Glucerna but doesn't like it per her daughter in law and she doesn't want to drink them. She does drink a lot of milk (see above under diabetes):  Had osteopenia in 1/2019, prior to her fracture     DEXA results 1/17/2019:  1. The spine bone density L1-L4 with T-score -2.4.  2. Femoral bone densities show left total hip T- score -2.4 and right femoral neck T-score -2.1.     Most recent Vitamin D:  2 years ago: 9.5 ng/mL    CAD/NSTEMI/CABG/angina:   Currently taking  metoprolol ER 25mg daily, imdur 15mg daily (half a 30mg tablet), aspirin 81mg daily, SL NTG as needed (has not needed in a long time). Lisinopril 2.5mg daily was stopped at last MTM visit due to hypotension.  Patient is not experiencing side effects.    BP Readings from Last 1 Encounters:   04/17/23 (!) 82/53     Pulse Readings from Last 1 Encounters:   04/17/23 74  "    Wt Readings from Last 1 Encounters:   04/03/23 132 lb 3.2 oz (60 kg)     Ht Readings from Last 1 Encounters:   04/03/23 4' 11\" (1.499 m)     Estimated body mass index is 26.7 kg/m  as calculated from the following:    Height as of 4/3/23: 4' 11\" (1.499 m).    Weight as of 4/3/23: 132 lb 3.2 oz (60 kg).    Temp Readings from Last 1 Encounters:   04/17/23 98  F (36.7  C) (Oral)       ----------------      I spent 30 minutes with this patient today.     All changes were made via collaborative practice agreement with Dr Maxwell    A copy of the visit note was provided to the patient's provider(s).    A summary of these recommendations was given to the patient.    Beth De Leon, Pharm.D.       Medication Therapy Recommendations  Osteopenia of multiple sites    Current Medication: vitamin D3 (CHOLECALCIFEROL) 50 mcg (2000 units) tablet   Rationale: Synergistic therapy - Needs additional medication therapy - Indication   Recommendation: Start Medication   Status: Accepted per CPA         Type 2 diabetes mellitus with hyperglycemia, without long-term current use of insulin (H)    Current Medication: dulaglutide (TRULICITY) 0.75 MG/0.5ML pen   Rationale: Synergistic therapy - Needs additional medication therapy - Indication   Recommendation: Start Medication   Status: Accepted per CPA          Current Medication: insulin glargine (LANTUS PEN) 100 UNIT/ML pen   Rationale: Dose too low - Dosage too low - Effectiveness   Recommendation: Increase Dose   Status: Accepted per CPA                     "

## 2023-04-17 ENCOUNTER — OFFICE VISIT (OUTPATIENT)
Dept: PHARMACY | Facility: CLINIC | Age: 78
End: 2023-04-17
Payer: COMMERCIAL

## 2023-04-17 VITALS
OXYGEN SATURATION: 97 % | TEMPERATURE: 98 F | DIASTOLIC BLOOD PRESSURE: 53 MMHG | SYSTOLIC BLOOD PRESSURE: 82 MMHG | RESPIRATION RATE: 16 BRPM | HEART RATE: 74 BPM

## 2023-04-17 DIAGNOSIS — E55.9 VITAMIN D DEFICIENCY: ICD-10-CM

## 2023-04-17 DIAGNOSIS — S32.010D COMPRESSION FRACTURE OF L1 VERTEBRA WITH ROUTINE HEALING: ICD-10-CM

## 2023-04-17 DIAGNOSIS — I10 ESSENTIAL HYPERTENSION: ICD-10-CM

## 2023-04-17 DIAGNOSIS — I25.110 CORONARY ARTERY DISEASE INVOLVING NATIVE CORONARY ARTERY OF NATIVE HEART WITH UNSTABLE ANGINA PECTORIS (H): ICD-10-CM

## 2023-04-17 DIAGNOSIS — M85.89 OSTEOPENIA OF MULTIPLE SITES: ICD-10-CM

## 2023-04-17 DIAGNOSIS — E11.65 TYPE 2 DIABETES MELLITUS WITH HYPERGLYCEMIA, WITHOUT LONG-TERM CURRENT USE OF INSULIN (H): Primary | ICD-10-CM

## 2023-04-17 PROCEDURE — 99606 MTMS BY PHARM EST 15 MIN: CPT | Performed by: PHARMACIST

## 2023-04-17 PROCEDURE — 99607 MTMS BY PHARM ADDL 15 MIN: CPT | Performed by: PHARMACIST

## 2023-04-17 RX ORDER — CHOLECALCIFEROL (VITAMIN D3) 50 MCG
1 TABLET ORAL DAILY
Qty: 90 TABLET | Refills: 3 | Status: SHIPPED | OUTPATIENT
Start: 2023-04-17 | End: 2024-08-02

## 2023-04-17 NOTE — PATIENT INSTRUCTIONS
Increase Lantus to 26 units daily  START Trulicity 0.75mg once weekly for 4 weeks, then increase to 1.5mg weekly  Start vitamin D 2000 units 1 tablet once daily  Decrease milk to a half glass 3 times daily  Try to do even small periods of time on your exercise bike      Follow-up: May 8 at 2:30pm with Dr. Zepeda and June 5 at 2:10pm with Dr. De Leon

## 2023-04-17 NOTE — Clinical Note
Spencer Maxwell and Duane, I saw this pt of yours today Dr. Maxwell, and I am copying you, Dr. Zepeda, as you are seeing her on May 8. Her BP was low on 4/3/23 (84/49) and I stopped the lisinopril 2.5mg then, and today BP was 82/53. She is on metoprolol ER 25mg daily for her CAD but that should not have much effect on her BP so I didn't stop it today. No other antihypertensives. Her SGLT2i might cause a small decrease in BP but it has been so difficult getting her to take diabetes meds I didn't do anything with it. Just wanted to give you a heads up for the appt May 8, and also wonder if she should come in sooner to evaluate for this. She did have some periodic symptoms of dizzy/lightheaded (endorsed when I asked her about low blood sugar symptoms) but not every day. Thanks!

## 2023-04-21 ENCOUNTER — MEDICAL CORRESPONDENCE (OUTPATIENT)
Dept: HEALTH INFORMATION MANAGEMENT | Facility: CLINIC | Age: 78
End: 2023-04-21
Payer: COMMERCIAL

## 2023-05-30 DIAGNOSIS — E11.65 TYPE 2 DIABETES MELLITUS WITH HYPERGLYCEMIA, WITHOUT LONG-TERM CURRENT USE OF INSULIN (H): ICD-10-CM

## 2023-06-06 ENCOUNTER — TELEPHONE (OUTPATIENT)
Dept: FAMILY MEDICINE | Facility: CLINIC | Age: 78
End: 2023-06-06
Payer: COMMERCIAL

## 2023-06-06 DIAGNOSIS — E11.69 TYPE 2 DIABETES MELLITUS WITH OTHER SPECIFIED COMPLICATION, WITHOUT LONG-TERM CURRENT USE OF INSULIN (H): Primary | ICD-10-CM

## 2023-06-06 NOTE — TELEPHONE ENCOUNTER
Plan does not cover this medication canagliflozin (INVOKANA) 300 MG tablet. Want to do PA? Please advise.    STANISLAV TaiA

## 2023-06-07 NOTE — TELEPHONE ENCOUNTER
Copy Dr. Maxwell's message for documentation purpose    Kayli Maxwell MD  You 8 minutes ago (1:18 PM)     AN  Yes, thank you.

## 2023-06-07 NOTE — TELEPHONE ENCOUNTER
Prior Authorization Retail Medication Request    Medication/Dose: canagliflozin (INVOKANA) 300 MG tablet    ICD code (if different than what is on RX):  Type 2 diabetes mellitus with hyperglycemia, without long-term current use of insulin (H) [E11.65]     Previously Tried and Failed:    Rationale:      Insurance Name:  [employer] DUAL [facility]  Insurance ID:  [id number]       Pharmacy Information (if different than what is on RX)  Name:  [facility]  Phone:  [phone]

## 2023-06-12 NOTE — TELEPHONE ENCOUNTER
PRIOR AUTHORIZATION DENIED    Medication: INVOKANA 300 MG PO TABS  Insurance Company: [employer] - Phone [phone] Fax [phone]  Denial Date: 6/12/2023    Denial Rational: Patient must have a history of trial & failure to 2 formulary alternatives or have a contraindication or intolerance to the formulary alternatives:  - Farxiga  - Jardiance      Appeal Information: If provider would like to appeal please provide a letter of medical necessity.

## 2023-06-12 NOTE — TELEPHONE ENCOUNTER
Central Prior Authorization Team  Phone: [phone]    PA Initiation    Medication: INVOKANA 300 MG PO TABS  Insurance Company: [employer] - Phone [phone] Fax [phone]  Pharmacy Filling the Rx: [id number] - [place], [place] - [address] AT [address]  Filling Pharmacy Phone: [phone]  Filling Pharmacy Fax:    Start Date: 6/12/2023

## 2023-06-14 ENCOUNTER — APPOINTMENT (OUTPATIENT)
Dept: INTERPRETER SERVICES | Facility: CLINIC | Age: 78
End: 2023-06-14
Payer: COMMERCIAL

## 2023-06-14 DIAGNOSIS — I25.119 CORONARY ARTERY DISEASE INVOLVING NATIVE HEART WITH ANGINA PECTORIS, UNSPECIFIED VESSEL OR LESION TYPE (H): ICD-10-CM

## 2023-06-14 DIAGNOSIS — E78.00 HYPERCHOLESTEREMIA: ICD-10-CM

## 2023-06-14 DIAGNOSIS — E11.9 TYPE 2 DIABETES MELLITUS WITHOUT COMPLICATION, WITHOUT LONG-TERM CURRENT USE OF INSULIN (H): ICD-10-CM

## 2023-06-14 RX ORDER — METOPROLOL SUCCINATE 25 MG/1
TABLET, EXTENDED RELEASE ORAL
Qty: 90 TABLET | Refills: 4 | Status: SHIPPED | OUTPATIENT
Start: 2023-06-14 | End: 2024-08-02

## 2023-06-14 RX ORDER — ISOSORBIDE MONONITRATE 30 MG/1
TABLET, EXTENDED RELEASE ORAL
Qty: 90 TABLET | Refills: 4 | Status: SHIPPED | OUTPATIENT
Start: 2023-06-14 | End: 2024-08-02

## 2023-06-14 RX ORDER — ATORVASTATIN CALCIUM 80 MG/1
TABLET, FILM COATED ORAL
Qty: 90 TABLET | Refills: 4 | Status: SHIPPED | OUTPATIENT
Start: 2023-06-14 | End: 2024-08-02

## 2023-06-14 RX ORDER — METFORMIN HCL 500 MG
TABLET, EXTENDED RELEASE 24 HR ORAL
Qty: 360 TABLET | Refills: 3 | Status: SHIPPED | OUTPATIENT
Start: 2023-06-14 | End: 2024-08-02

## 2023-06-14 NOTE — TELEPHONE ENCOUNTER
Prescribed Jardiance 25mg to take daily in place of Invokana 300mg - as Jardiance is covered by the patient's insurance.    Kayli Maxwell MD

## 2023-06-14 NOTE — TELEPHONE ENCOUNTER
Called w/  and daughter-in-law w/ pt on the phone. Notified them both of below and verbalized understanding.    Due to insurance, the patient is being transitioned from Invokana to an equivalent medication, Jardiance, to take once daily. This was sent to her pharmacy.  Could you advise the patient of this switch? I would not anticipate and change in side effects given this is equivalent to the medication she was previously taking.   Thank you!   Dr. Hans Philip, RN on 6/14/2023 at 11:03 AM

## 2023-07-08 ENCOUNTER — HEALTH MAINTENANCE LETTER (OUTPATIENT)
Age: 78
End: 2023-07-08

## 2023-09-07 NOTE — LETTER
"Recommended To-Do List      Prepared on: Apr 3, 2023       You can get the best results from your medications by completing the items on this \"To-Do List.\"      Bring your To-Do List when you go to your doctor. And, share it with your family or caregivers.    My To-Do List:  What we talked about: What I should do:   An issue with your medication    Stop taking lisinopril (ZESTRIL)          What we talked about: What I should do:   A new medication that may be of benefit to you    Start taking alendronate once weekly as prescribed          What we talked about: What I should do:   Your medication dosage being too low    Increase your dosage of insulin glargine (LANTUS PEN) to 20 units daily          What we talked about: What I should do:   The importance of taking your medication as intended    Education: Take your aspirin every day for prevention of future heart attacks          What we talked about: What I should do:                     " Pt Daughter [place] [phone]. States pt has already been taking tylenol and ibuprofen with no relief. Anything else pt can be prescribed? States physical therapy was mentioned, they would like that referral.    Wants to know what is next regarding the imaging results. Explained that insurance typically doesn't cover any additional imaging until pt tries physical therapy.

## 2023-11-25 ENCOUNTER — HEALTH MAINTENANCE LETTER (OUTPATIENT)
Age: 78
End: 2023-11-25

## 2023-12-16 NOTE — PROGRESS NOTES
Clinical Pharmacy Consult:                                                    Jenise Paz is a 73 year old female seen for a clinical pharmacist consult.  She was referred to me from Dr. Cortez.     Reason for Consult: She was prescribed Bydureon at her last visit and came in today to learn how to use it. Her daughter in law was with her today, who gives her her insulin and will be injecting her Bydureon as well.       Discussion: Using her own Bydureon pen, I educated patient's daughter in law on exactly how to prepare, mix, inject and dispose of the Bydureon pen.  Emphasized that it was only to be used once weekly.    Plan:  1. After I walked her through all the steps, patient's daughter in law prepared, mixed and injected the Bydureon while in clinic  2. She will inject it from now on once weekly on Mondays.  3. Stop Januvia as starting Bydureon  4. Per Dr. Cortez, Basaglar/Lantus is also being increased to 20 units daily today.    Beth De Leon, Pharm.D.       [facility]    Arterial line placement    Date/Time: 12/15/2023 6:09 PM    Performed by: Kal Brown PA-C  Authorized by: Kal Brown PA-C      UNIVERSAL PROTOCOL   Site Marked: Yes  Prior Images Obtained and Reviewed:  Yes  Required items: Required blood products, implants, devices and special equipment available    Patient identity confirmed:  Arm band  NA - No sedation, light sedation, or local anesthesia  Confirmation Checklist:  Relevant allergies, procedure was appropriate and matched the consent or emergent situation, correct equipment/implants were available and patient's identity using two indicators  Universal Protocol: the Joint Commission Universal Protocol was followed    Preparation: Patient was prepped and draped in usual sterile fashion    ESBL (mL):  5  Indication:  multiple ABGs respiratory failure hemodynamic monitoring  Location:  Left femoral      SEDATION    Patient Sedated: No      PROCEDURE DETAILS      Seldinger technique: Seldinger technique used    Number of Attempts:  1  Post-procedure:  Line sutured      PROCEDURE    Patient Tolerance:  Patient tolerated the procedure well with no immediate complications  Length of time physician/provider present for 1:1 monitoring during sedation: 0

## 2024-02-08 ENCOUNTER — MEDICAL CORRESPONDENCE (OUTPATIENT)
Dept: HEALTH INFORMATION MANAGEMENT | Facility: CLINIC | Age: 79
End: 2024-02-08
Payer: COMMERCIAL

## 2024-02-09 ENCOUNTER — DOCUMENTATION ONLY (OUTPATIENT)
Dept: FAMILY MEDICINE | Facility: CLINIC | Age: 79
End: 2024-02-09
Payer: COMMERCIAL

## 2024-02-09 NOTE — PROGRESS NOTES
To be completed in Nursing note:    Please reference list for forms that require a visit for completion.  Please remind patients that providers are given 3-5 business days to complete and return forms.      Form type: [employer] ~ WALKER   WALKER SEAT    Date form received: 2/2/2024    Date form completed by Physician: 2/8/2024    How was form returned to patient (mailed, faxed, or at  for patient to ):    Fax to [phone]    Date form mailed/faxed/left at  for patient and sent to HIM for scanning:    Fax on 2/9/2024    Once form is left for patient, faxed, or mailed PCS will then close the documentation only encounter.

## 2024-02-09 NOTE — PROGRESS NOTES
To be completed in Nursing note:    Please reference list for forms that require a visit for completion.  Please remind patients that providers are given 3-5 business days to complete and return forms.      Form type: [employer] ~ PRESCRIPTION REQUEST  : BATH SEAT W/ BACK & ARMS 275LB CAP    Date form received: 2/2/2024    Date form completed by Physician: 2/8/2024    How was form returned to patient (mailed, faxed, or at  for patient to ):    Fax to [phone]    Date form mailed/faxed/left at  for patient and sent to HIM for scanning:    Fax on 2/9/2024    Once form is left for patient, faxed, or mailed PCS will then close the documentation only encounter.

## 2024-04-04 ENCOUNTER — TRANSFERRED RECORDS (OUTPATIENT)
Dept: MULTI SPECIALTY CLINIC | Facility: CLINIC | Age: 79
End: 2024-04-04

## 2024-04-04 LAB — RETINOPATHY: NORMAL

## 2024-05-23 ENCOUNTER — MEDICAL CORRESPONDENCE (OUTPATIENT)
Dept: HEALTH INFORMATION MANAGEMENT | Facility: CLINIC | Age: 79
End: 2024-05-23
Payer: COMMERCIAL

## 2024-05-24 ENCOUNTER — DOCUMENTATION ONLY (OUTPATIENT)
Dept: FAMILY MEDICINE | Facility: CLINIC | Age: 79
End: 2024-05-24
Payer: COMMERCIAL

## 2024-05-24 NOTE — PROGRESS NOTES
To be completed in Nursing note:    Please reference list for forms that require a visit for completion.  Please remind patients that providers are given 3-5 business days to complete and return forms.      Form type: [employer]    Date form received: 5/17/2024    Date form completed by Physician: 5/23/2024    How was form returned to patient (mailed, faxed, or at  for patient to ): FAXED BACK [phone]    Date form mailed/faxed/left at  for patient and sent to HIM for scanning:    FAXED 5/24/2024  Once form is left for patient, faxed, or mailed PCS will then close the documentation only encounter.         Karely Marcus MA

## 2024-05-24 NOTE — PROGRESS NOTES
To be completed in Nursing note:    Please reference list for forms that require a visit for completion.  Please remind patients that providers are given 3-5 business days to complete and return forms.      Form type: [employer]; Incontinence supply order prescription    Date form received: 5/17/2024    Date form completed by Physician: 5/23/24    How was form returned to patient (mailed, faxed, or at  for patient to ):   FAXED BACK [phone]  Date form mailed/faxed/left at  for patient and sent to HIM for scanning: FAXED 5/24/2024      Once form is left for patient, faxed, or mailed PCS will then close the documentation only encounter.     Karely Marcus MA

## 2024-06-22 ENCOUNTER — HEALTH MAINTENANCE LETTER (OUTPATIENT)
Age: 79
End: 2024-06-22

## 2024-07-20 ENCOUNTER — HOSPITAL ENCOUNTER (OUTPATIENT)
Facility: HOSPITAL | Age: 79
Setting detail: OBSERVATION
Discharge: HOME | End: 2024-07-22
Attending: EMERGENCY MEDICINE | Admitting: FAMILY MEDICINE
Payer: COMMERCIAL

## 2024-07-20 ENCOUNTER — APPOINTMENT (OUTPATIENT)
Dept: RADIOLOGY | Facility: HOSPITAL | Age: 79
End: 2024-07-20
Attending: EMERGENCY MEDICINE
Payer: COMMERCIAL

## 2024-07-20 DIAGNOSIS — Z91.148 NONCOMPLIANCE WITH MEDICATION REGIMEN: ICD-10-CM

## 2024-07-20 DIAGNOSIS — E11.65 TYPE 2 DIABETES MELLITUS WITH HYPERGLYCEMIA, WITH LONG-TERM CURRENT USE OF INSULIN (H): ICD-10-CM

## 2024-07-20 DIAGNOSIS — R53.83 OTHER FATIGUE: ICD-10-CM

## 2024-07-20 DIAGNOSIS — R79.89 PSEUDOHYPONATREMIA: ICD-10-CM

## 2024-07-20 DIAGNOSIS — Z79.4 TYPE 2 DIABETES MELLITUS WITH HYPERGLYCEMIA, WITH LONG-TERM CURRENT USE OF INSULIN (H): ICD-10-CM

## 2024-07-20 LAB
ALBUMIN SERPL BCG-MCNC: 3.6 G/DL (ref 3.5–5.2)
ALBUMIN UR-MCNC: NEGATIVE MG/DL
ALP SERPL-CCNC: 207 U/L (ref 40–150)
ALT SERPL W P-5'-P-CCNC: 15 U/L (ref 0–50)
ANION GAP SERPL CALCULATED.3IONS-SCNC: 11 MMOL/L (ref 7–15)
ANION GAP SERPL CALCULATED.3IONS-SCNC: 14 MMOL/L (ref 7–15)
APPEARANCE UR: CLEAR
AST SERPL W P-5'-P-CCNC: 14 U/L (ref 0–45)
B-OH-BUTYR SERPL-SCNC: <0.18 MMOL/L
BACTERIA #/AREA URNS HPF: ABNORMAL /HPF
BASE EXCESS BLDV CALC-SCNC: 0.8 MMOL/L (ref -3–3)
BASOPHILS # BLD AUTO: 0 10E3/UL (ref 0–0.2)
BASOPHILS NFR BLD AUTO: 0 %
BILIRUB DIRECT SERPL-MCNC: <0.2 MG/DL (ref 0–0.3)
BILIRUB SERPL-MCNC: 0.5 MG/DL
BILIRUB UR QL STRIP: NEGATIVE
BUN SERPL-MCNC: 18.5 MG/DL (ref 8–23)
BUN SERPL-MCNC: 20.7 MG/DL (ref 8–23)
CALCIUM SERPL-MCNC: 8 MG/DL (ref 8.8–10.4)
CALCIUM SERPL-MCNC: 8.8 MG/DL (ref 8.8–10.4)
CHLORIDE SERPL-SCNC: 85 MMOL/L (ref 98–107)
CHLORIDE SERPL-SCNC: 96 MMOL/L (ref 98–107)
COLOR UR AUTO: COLORLESS
CREAT SERPL-MCNC: 0.56 MG/DL (ref 0.51–0.95)
CREAT SERPL-MCNC: 0.64 MG/DL (ref 0.51–0.95)
EGFRCR SERPLBLD CKD-EPI 2021: >90 ML/MIN/1.73M2
EGFRCR SERPLBLD CKD-EPI 2021: >90 ML/MIN/1.73M2
EOSINOPHIL # BLD AUTO: 0 10E3/UL (ref 0–0.7)
EOSINOPHIL NFR BLD AUTO: 0 %
ERYTHROCYTE [DISTWIDTH] IN BLOOD BY AUTOMATED COUNT: 14.3 % (ref 10–15)
ERYTHROCYTE [DISTWIDTH] IN BLOOD BY AUTOMATED COUNT: 14.6 % (ref 10–15)
GLUCOSE BLDC GLUCOMTR-MCNC: 453 MG/DL (ref 70–99)
GLUCOSE BLDC GLUCOMTR-MCNC: 476 MG/DL (ref 70–99)
GLUCOSE BLDC GLUCOMTR-MCNC: >600 MG/DL (ref 70–99)
GLUCOSE SERPL-MCNC: 1032 MG/DL (ref 70–99)
GLUCOSE SERPL-MCNC: 706 MG/DL (ref 70–99)
GLUCOSE UR STRIP-MCNC: >1000 MG/DL
HBA1C MFR BLD: >20.1 %
HCO3 BLDV-SCNC: 26 MMOL/L (ref 21–28)
HCO3 SERPL-SCNC: 22 MMOL/L (ref 22–29)
HCO3 SERPL-SCNC: 24 MMOL/L (ref 22–29)
HCT VFR BLD AUTO: 33.2 % (ref 35–47)
HCT VFR BLD AUTO: 37.9 % (ref 35–47)
HGB BLD-MCNC: 10.3 G/DL (ref 11.7–15.7)
HGB BLD-MCNC: 11.7 G/DL (ref 11.7–15.7)
HGB UR QL STRIP: NEGATIVE
IMM GRANULOCYTES # BLD: 0 10E3/UL
IMM GRANULOCYTES NFR BLD: 0 %
KETONES UR STRIP-MCNC: NEGATIVE MG/DL
LEUKOCYTE ESTERASE UR QL STRIP: NEGATIVE
LIPASE SERPL-CCNC: 72 U/L (ref 13–60)
LYMPHOCYTES # BLD AUTO: 1 10E3/UL (ref 0.8–5.3)
LYMPHOCYTES NFR BLD AUTO: 16 %
MAGNESIUM SERPL-MCNC: 1.9 MG/DL (ref 1.7–2.3)
MAGNESIUM SERPL-MCNC: 2.1 MG/DL (ref 1.7–2.3)
MCH RBC QN AUTO: 20.7 PG (ref 26.5–33)
MCH RBC QN AUTO: 20.9 PG (ref 26.5–33)
MCHC RBC AUTO-ENTMCNC: 30.9 G/DL (ref 31.5–36.5)
MCHC RBC AUTO-ENTMCNC: 31 G/DL (ref 31.5–36.5)
MCV RBC AUTO: 67 FL (ref 78–100)
MCV RBC AUTO: 68 FL (ref 78–100)
MONOCYTES # BLD AUTO: 0.4 10E3/UL (ref 0–1.3)
MONOCYTES NFR BLD AUTO: 6 %
MUCOUS THREADS #/AREA URNS LPF: PRESENT /LPF
NEUTROPHILS # BLD AUTO: 4.8 10E3/UL (ref 1.6–8.3)
NEUTROPHILS NFR BLD AUTO: 77 %
NITRATE UR QL: POSITIVE
NRBC # BLD AUTO: 0 10E3/UL
NRBC BLD AUTO-RTO: 0 /100
NT-PROBNP SERPL-MCNC: 516 PG/ML (ref 0–1800)
O2/TOTAL GAS SETTING VFR VENT: 21 %
OXYHGB MFR BLDV: 59 % (ref 70–75)
PCO2 BLDV: 46 MM HG (ref 40–50)
PH BLDV: 7.36 [PH] (ref 7.32–7.43)
PH UR STRIP: 6.5 [PH] (ref 5–7)
PLATELET # BLD AUTO: 160 10E3/UL (ref 150–450)
PLATELET # BLD AUTO: 210 10E3/UL (ref 150–450)
PO2 BLDV: 32 MM HG (ref 25–47)
POTASSIUM SERPL-SCNC: 4.5 MMOL/L (ref 3.4–5.3)
POTASSIUM SERPL-SCNC: 4.9 MMOL/L (ref 3.4–5.3)
PROT SERPL-MCNC: 7.1 G/DL (ref 6.4–8.3)
RBC # BLD AUTO: 4.97 10E6/UL (ref 3.8–5.2)
RBC # BLD AUTO: 5.6 10E6/UL (ref 3.8–5.2)
RBC URINE: <1 /HPF
SAO2 % BLDV: 60.5 % (ref 70–75)
SODIUM SERPL-SCNC: 123 MMOL/L (ref 135–145)
SODIUM SERPL-SCNC: 129 MMOL/L (ref 135–145)
SP GR UR STRIP: 1.03 (ref 1–1.03)
TROPONIN T SERPL HS-MCNC: 14 NG/L
TROPONIN T SERPL HS-MCNC: 14 NG/L
UROBILINOGEN UR STRIP-MCNC: <2 MG/DL
WBC # BLD AUTO: 5.7 10E3/UL (ref 4–11)
WBC # BLD AUTO: 6.2 10E3/UL (ref 4–11)
WBC URINE: 3 /HPF

## 2024-07-20 PROCEDURE — 99285 EMERGENCY DEPT VISIT HI MDM: CPT | Mod: 25

## 2024-07-20 PROCEDURE — 36415 COLL VENOUS BLD VENIPUNCTURE: CPT | Performed by: STUDENT IN AN ORGANIZED HEALTH CARE EDUCATION/TRAINING PROGRAM

## 2024-07-20 PROCEDURE — 250N000012 HC RX MED GY IP 250 OP 636 PS 637

## 2024-07-20 PROCEDURE — 87086 URINE CULTURE/COLONY COUNT: CPT | Performed by: EMERGENCY MEDICINE

## 2024-07-20 PROCEDURE — 120N000001 HC R&B MED SURG/OB

## 2024-07-20 PROCEDURE — 258N000003 HC RX IP 258 OP 636: Performed by: EMERGENCY MEDICINE

## 2024-07-20 PROCEDURE — 85027 COMPLETE CBC AUTOMATED: CPT | Mod: 91 | Performed by: STUDENT IN AN ORGANIZED HEALTH CARE EDUCATION/TRAINING PROGRAM

## 2024-07-20 PROCEDURE — 36415 COLL VENOUS BLD VENIPUNCTURE: CPT | Performed by: EMERGENCY MEDICINE

## 2024-07-20 PROCEDURE — 82962 GLUCOSE BLOOD TEST: CPT

## 2024-07-20 PROCEDURE — 96360 HYDRATION IV INFUSION INIT: CPT

## 2024-07-20 PROCEDURE — 258N000003 HC RX IP 258 OP 636

## 2024-07-20 PROCEDURE — 96372 THER/PROPH/DIAG INJ SC/IM: CPT | Mod: 59

## 2024-07-20 PROCEDURE — 71046 X-RAY EXAM CHEST 2 VIEWS: CPT

## 2024-07-20 PROCEDURE — 80076 HEPATIC FUNCTION PANEL: CPT | Performed by: EMERGENCY MEDICINE

## 2024-07-20 PROCEDURE — 36415 COLL VENOUS BLD VENIPUNCTURE: CPT

## 2024-07-20 PROCEDURE — 87186 SC STD MICRODIL/AGAR DIL: CPT | Performed by: EMERGENCY MEDICINE

## 2024-07-20 PROCEDURE — 250N000011 HC RX IP 250 OP 636

## 2024-07-20 PROCEDURE — 82947 ASSAY GLUCOSE BLOOD QUANT: CPT | Performed by: STUDENT IN AN ORGANIZED HEALTH CARE EDUCATION/TRAINING PROGRAM

## 2024-07-20 PROCEDURE — 85025 COMPLETE CBC W/AUTO DIFF WBC: CPT | Performed by: EMERGENCY MEDICINE

## 2024-07-20 PROCEDURE — 250N000013 HC RX MED GY IP 250 OP 250 PS 637: Performed by: EMERGENCY MEDICINE

## 2024-07-20 PROCEDURE — 250N000013 HC RX MED GY IP 250 OP 250 PS 637

## 2024-07-20 PROCEDURE — 83036 HEMOGLOBIN GLYCOSYLATED A1C: CPT

## 2024-07-20 PROCEDURE — 84484 ASSAY OF TROPONIN QUANT: CPT | Mod: 91

## 2024-07-20 PROCEDURE — 93005 ELECTROCARDIOGRAM TRACING: CPT | Performed by: EMERGENCY MEDICINE

## 2024-07-20 PROCEDURE — 84484 ASSAY OF TROPONIN QUANT: CPT | Performed by: EMERGENCY MEDICINE

## 2024-07-20 PROCEDURE — 82805 BLOOD GASES W/O2 SATURATION: CPT | Performed by: EMERGENCY MEDICINE

## 2024-07-20 PROCEDURE — 82010 KETONE BODYS QUAN: CPT | Performed by: EMERGENCY MEDICINE

## 2024-07-20 PROCEDURE — 96361 HYDRATE IV INFUSION ADD-ON: CPT

## 2024-07-20 PROCEDURE — 83735 ASSAY OF MAGNESIUM: CPT | Performed by: EMERGENCY MEDICINE

## 2024-07-20 PROCEDURE — 83735 ASSAY OF MAGNESIUM: CPT

## 2024-07-20 PROCEDURE — 81001 URINALYSIS AUTO W/SCOPE: CPT | Performed by: EMERGENCY MEDICINE

## 2024-07-20 PROCEDURE — 83880 ASSAY OF NATRIURETIC PEPTIDE: CPT

## 2024-07-20 PROCEDURE — 83690 ASSAY OF LIPASE: CPT | Performed by: EMERGENCY MEDICINE

## 2024-07-20 RX ORDER — DEXTROSE MONOHYDRATE 25 G/50ML
25-50 INJECTION, SOLUTION INTRAVENOUS
Status: DISCONTINUED | OUTPATIENT
Start: 2024-07-20 | End: 2024-07-22 | Stop reason: HOSPADM

## 2024-07-20 RX ORDER — METFORMIN HCL 500 MG
2000 TABLET, EXTENDED RELEASE 24 HR ORAL
Status: DISCONTINUED | OUTPATIENT
Start: 2024-07-20 | End: 2024-07-22 | Stop reason: HOSPADM

## 2024-07-20 RX ORDER — ENOXAPARIN SODIUM 100 MG/ML
40 INJECTION SUBCUTANEOUS EVERY 24 HOURS
Status: DISCONTINUED | OUTPATIENT
Start: 2024-07-20 | End: 2024-07-22 | Stop reason: HOSPADM

## 2024-07-20 RX ORDER — PROCHLORPERAZINE MALEATE 5 MG
5 TABLET ORAL EVERY 6 HOURS PRN
Status: DISCONTINUED | OUTPATIENT
Start: 2024-07-20 | End: 2024-07-22 | Stop reason: HOSPADM

## 2024-07-20 RX ORDER — AMOXICILLIN 250 MG
2 CAPSULE ORAL 2 TIMES DAILY PRN
Status: DISCONTINUED | OUTPATIENT
Start: 2024-07-20 | End: 2024-07-22 | Stop reason: HOSPADM

## 2024-07-20 RX ORDER — GABAPENTIN 300 MG/1
300 CAPSULE ORAL 2 TIMES DAILY
Status: DISCONTINUED | OUTPATIENT
Start: 2024-07-20 | End: 2024-07-22 | Stop reason: HOSPADM

## 2024-07-20 RX ORDER — ATORVASTATIN CALCIUM 40 MG/1
80 TABLET, FILM COATED ORAL EVERY EVENING
Status: DISCONTINUED | OUTPATIENT
Start: 2024-07-20 | End: 2024-07-22 | Stop reason: HOSPADM

## 2024-07-20 RX ORDER — ACETAMINOPHEN 650 MG/1
650 SUPPOSITORY RECTAL EVERY 4 HOURS PRN
Status: DISCONTINUED | OUTPATIENT
Start: 2024-07-20 | End: 2024-07-22 | Stop reason: HOSPADM

## 2024-07-20 RX ORDER — ACETAMINOPHEN 325 MG/1
650 TABLET ORAL EVERY 4 HOURS PRN
Status: DISCONTINUED | OUTPATIENT
Start: 2024-07-20 | End: 2024-07-22 | Stop reason: HOSPADM

## 2024-07-20 RX ORDER — FAMOTIDINE 20 MG/1
20 TABLET, FILM COATED ORAL 2 TIMES DAILY PRN
Status: DISCONTINUED | OUTPATIENT
Start: 2024-07-20 | End: 2024-07-22 | Stop reason: HOSPADM

## 2024-07-20 RX ORDER — METOPROLOL SUCCINATE 25 MG/1
25 TABLET, EXTENDED RELEASE ORAL DAILY
Status: DISCONTINUED | OUTPATIENT
Start: 2024-07-21 | End: 2024-07-22 | Stop reason: HOSPADM

## 2024-07-20 RX ORDER — LIDOCAINE 40 MG/G
CREAM TOPICAL
Status: DISCONTINUED | OUTPATIENT
Start: 2024-07-20 | End: 2024-07-22 | Stop reason: HOSPADM

## 2024-07-20 RX ORDER — PROCHLORPERAZINE 25 MG
12.5 SUPPOSITORY, RECTAL RECTAL EVERY 12 HOURS PRN
Status: DISCONTINUED | OUTPATIENT
Start: 2024-07-20 | End: 2024-07-22 | Stop reason: HOSPADM

## 2024-07-20 RX ORDER — ONDANSETRON 2 MG/ML
4 INJECTION INTRAMUSCULAR; INTRAVENOUS EVERY 6 HOURS PRN
Status: DISCONTINUED | OUTPATIENT
Start: 2024-07-20 | End: 2024-07-22 | Stop reason: HOSPADM

## 2024-07-20 RX ORDER — ASPIRIN 81 MG/1
81 TABLET ORAL DAILY
Status: DISCONTINUED | OUTPATIENT
Start: 2024-07-21 | End: 2024-07-22 | Stop reason: HOSPADM

## 2024-07-20 RX ORDER — AMOXICILLIN 250 MG
1 CAPSULE ORAL 2 TIMES DAILY PRN
Status: DISCONTINUED | OUTPATIENT
Start: 2024-07-20 | End: 2024-07-22 | Stop reason: HOSPADM

## 2024-07-20 RX ORDER — CALCIUM CARBONATE 500 MG/1
1000 TABLET, CHEWABLE ORAL 4 TIMES DAILY PRN
Status: DISCONTINUED | OUTPATIENT
Start: 2024-07-20 | End: 2024-07-22 | Stop reason: HOSPADM

## 2024-07-20 RX ORDER — ACETAMINOPHEN 325 MG/1
975 TABLET ORAL ONCE
Status: COMPLETED | OUTPATIENT
Start: 2024-07-20 | End: 2024-07-20

## 2024-07-20 RX ORDER — NICOTINE POLACRILEX 4 MG
15-30 LOZENGE BUCCAL
Status: DISCONTINUED | OUTPATIENT
Start: 2024-07-20 | End: 2024-07-22 | Stop reason: HOSPADM

## 2024-07-20 RX ORDER — SODIUM CHLORIDE, SODIUM LACTATE, POTASSIUM CHLORIDE, CALCIUM CHLORIDE 600; 310; 30; 20 MG/100ML; MG/100ML; MG/100ML; MG/100ML
INJECTION, SOLUTION INTRAVENOUS CONTINUOUS
Status: DISCONTINUED | OUTPATIENT
Start: 2024-07-20 | End: 2024-07-22 | Stop reason: HOSPADM

## 2024-07-20 RX ORDER — ONDANSETRON 4 MG/1
4 TABLET, ORALLY DISINTEGRATING ORAL EVERY 6 HOURS PRN
Status: DISCONTINUED | OUTPATIENT
Start: 2024-07-20 | End: 2024-07-22 | Stop reason: HOSPADM

## 2024-07-20 RX ADMIN — ATORVASTATIN CALCIUM 80 MG: 40 TABLET, FILM COATED ORAL at 20:51

## 2024-07-20 RX ADMIN — GABAPENTIN 300 MG: 300 CAPSULE ORAL at 20:51

## 2024-07-20 RX ADMIN — METFORMIN ER 500 MG 2000 MG: 500 TABLET ORAL at 20:51

## 2024-07-20 RX ADMIN — SODIUM CHLORIDE 2000 ML: 9 INJECTION, SOLUTION INTRAVENOUS at 17:06

## 2024-07-20 RX ADMIN — SODIUM CHLORIDE, POTASSIUM CHLORIDE, SODIUM LACTATE AND CALCIUM CHLORIDE: 600; 310; 30; 20 INJECTION, SOLUTION INTRAVENOUS at 21:13

## 2024-07-20 RX ADMIN — INSULIN ASPART 7 UNITS: 100 INJECTION, SOLUTION INTRAVENOUS; SUBCUTANEOUS at 20:05

## 2024-07-20 RX ADMIN — ENOXAPARIN SODIUM 40 MG: 40 INJECTION SUBCUTANEOUS at 21:50

## 2024-07-20 RX ADMIN — SODIUM CHLORIDE 500 ML: 9 INJECTION, SOLUTION INTRAVENOUS at 16:09

## 2024-07-20 RX ADMIN — INSULIN GLARGINE 12 UNITS: 100 INJECTION, SOLUTION SUBCUTANEOUS at 20:06

## 2024-07-20 RX ADMIN — ACETAMINOPHEN 975 MG: 325 TABLET, FILM COATED ORAL at 16:20

## 2024-07-20 ASSESSMENT — ACTIVITIES OF DAILY LIVING (ADL)
ADLS_ACUITY_SCORE: 35

## 2024-07-20 ASSESSMENT — COLUMBIA-SUICIDE SEVERITY RATING SCALE - C-SSRS
2. HAVE YOU ACTUALLY HAD ANY THOUGHTS OF KILLING YOURSELF IN THE PAST MONTH?: NO
6. HAVE YOU EVER DONE ANYTHING, STARTED TO DO ANYTHING, OR PREPARED TO DO ANYTHING TO END YOUR LIFE?: NO
1. IN THE PAST MONTH, HAVE YOU WISHED YOU WERE DEAD OR WISHED YOU COULD GO TO SLEEP AND NOT WAKE UP?: NO

## 2024-07-20 NOTE — ED NOTES
"[facility] ED Handoff Report    ED Chief Complaint: Shortness of Breath    ED Diagnosis:  (E11.65,  Z79.4) Type 2 diabetes mellitus with hyperglycemia, with long-term current use of insulin (H)  Comment:   Plan:     (R79.89) Pseudohyponatremia  Comment:   Plan:     (R53.83) Other fatigue  Comment:   Plan:     (Z91.148) Noncompliance with medication regimen  Comment:   Plan:        PMH:    Past Medical History:   Diagnosis Date    Chronic kidney disease, stage 2 (mild) 3/22/2023    Diabetes (H)     Hyperlipidemia 10/13/2016    Hypertension         Code Status:  No Order     Falls Risk: No Band: Not applicable    Current Living Situation/Residence: lives with their daughter     Elimination Status: Continent: Yes     Activity Level: SBA    Patients Preferred Language:  Other: HMONG     Needed: Yes- Daughter at bedside    Vital Signs:  /56   Pulse 64   Temp 98.7  F (37.1  C) (Oral)   Resp 16   Ht 1.499 m (4' 11\")   Wt 55.3 kg (122 lb)   SpO2 92%   BMI 24.64 kg/m       Cardiac Rhythm: Afib    Pain Score: 0/10    Is the Patient Confused:  No- medical literacy Is low     Last Food or Drink: 07/20/24 at before arrival to the [facility]    Focused Assessment:  Patient has been non compliment with insulin and has not been hungry and has had a 10lb weight loss. Daughter ( who she doen't live with ) is at bedside. No other complaints     Tests Performed: Done: Labs and Imaging    Treatments Provided:  Fluids 2500ml    Family Dynamics/Concerns: No    Family Updated On Visitor Policy: Yes    Plan of Care Communicated to Family: Yes    Who Was Updated about Plan of Care: daughter at beside     Belongings Checklist Done and Signed by Patient: No    Belongings Sent with Patient: clothes    Medications sent with patient: none    Covid: asymptomatic , not tested    Additional Information:     RN: Larisa Ortiz RN   7/20/2024 5:37 PM        "

## 2024-07-20 NOTE — MEDICATION SCRIBE - ADMISSION MEDICATION HISTORY
Medication Scribe Admission Medication History    Admission medication history is complete. The information provided in this note is only as accurate as the sources available at the time of the update.    Information Source(s): Patient, Family member, and CareEverywhere/SureScripts via in-person    Pertinent Information:   pt daughter who manages med's wasn't available too give med rec, however pt second daughter who also helps with med at  bedside but knows limited info provided med list of pt home med's and last dose  Per daughter pt hasn't been using insulin glargine, Trulicity past 2-3 months due to pt not knowing how to use meds.   per daughter pt also refused to use both med's when daughter was able to help pt learn states pt felt overwhelmed with all the oral med's she was on and thinks it too much medications so stopping taking on her own.    Changes made to PTA medication list:  Added: None  Deleted: Trulicity -pt isn't using med currently been off 2-3 months- taking oral diabetic medication instead  Changed: None    Allergies reviewed with patient and updates made in EHR: yes    Medication History Completed By: LUZ ELENA JAVIER 7/20/2024 5:38 PM    PTA Med List   Medication Sig Last Dose    ACETAMINOPHEN EXTRA STRENGTH 500 MG tablet TAKE 2 TABLETS BY MOUTH THREE TIMES DAILY AS NEEDED Unknown at prn    alendronate (FOSAMAX) 70 MG tablet Take 1 tablet (70 mg) by mouth every 7 days Past Week at unknown , past couple weeks    aspirin (ASA) 81 MG EC tablet Take 1 tablet (81 mg) by mouth daily 7/20/2024 at am    atorvastatin (LIPITOR) 80 MG tablet TAKE 1 TABLET(80 MG) BY MOUTH DAILY 7/19/2024 at pm    famotidine (PEPCID) 20 MG tablet TAKE 1 TABLET BY MOUTH 2 TIMES DAILY AS NEEDED Unknown at prn    gabapentin (NEURONTIN) 300 MG capsule TAKE 1 CAPSULE(300 MG) BY MOUTH TWICE DAILY 7/20/2024 at am    insulin glargine (LANTUS PEN) 100 UNIT/ML pen Inject 26 Units Subcutaneous At Bedtime More than a month     isosorbide mononitrate (IMDUR) 30 MG 24 hr tablet TAKE 1/2 TABLET(15 MG) BY MOUTH EVERY DAY 7/20/2024 at am    metFORMIN (GLUCOPHAGE XR) 500 MG 24 hr tablet TAKE 4 TABLETS BY MOUTH EVERY DAY WITH FOOD 7/19/2024 at pm    metoprolol succinate ER (TOPROL XL) 25 MG 24 hr tablet TAKE 1 TABLET BY MOUTH EVERY DAY 7/20/2024 at am    vitamin D3 (CHOLECALCIFEROL) 50 mcg (2000 units) tablet Take 1 tablet (50 mcg) by mouth daily 7/20/2024 at am

## 2024-07-20 NOTE — ED PROVIDER NOTES
Emergency Department Encounter     Evaluation Date & Time:   7/20/2024  3:48 PM    CHIEF COMPLAINT:  Shortness of Breath, Fatigue, and Chest Pain      Triage Note:Pt arrives to triage with c/o fatigue, sob and chest pain all over for the last week. HX: bypass 2018, DM, gallbladder removal                 ED COURSE & MEDICAL DECISION MAKING:     Pt is here with family for evaluation of symptoms ongoing for a few weeks, a little worse over the past week or so. Family here with her and pt declines  services. She reports fatigue, dyspnea, chest pain.  Cough was earlier in the week with some post-tussive emesis 2 days ago, but seems to be improved.  Pt worried about dehydration and her vitals, so she came in now.  She has an entirely benign abdomen with no pain.  Denies bowel/bladder changes, vitally well here with reassuring/stable EKG. Will get labs, including hsTrop, hydrate, treat symptomatically and reassess.    ED Course as of 07/20/24 2023   Sat Jul 20, 2024   1606  I met with the patient, obtained history, performed an initial exam, and discussed options and plan for diagnostics and treatment here in the ED.    1648 Rechecked patient and asked if patient is taking her diabetes medication. Patient is not taking her insulin daily. Glucose 1032, but no DKA. She has pseudohyponatremia present. K 4.9. Will continue IVF, likely start insulin gtt, which warrants ICU admission.  Pt and family aware, agreeable.     1700 Ketones < 0.18.  Awaiting VBG.  Nothing to suggest DKA. Will admit, discuss with admitting for insulin gtt given how elevated glucose is.   1717 I spoke with [place] resident. They will confirm with attending whether they should do insulin gtt or not at this point.     1722 [place] attending ok with pt going to floor, no insulin gtt. They will place insulin orders.         Medical Decision Making    History:  Supplemental history from: Documented in chart and Family  Member/Significant Other  External Record(s) reviewed: Documented in chart    Work Up:  Chart documentation includes differential considered and any EKGs or imaging independently interpreted by provider, where specified.  In additional to work up documented, I considered the following work up: Documented in chart, if applicable.    External consultation:  Discussion of management with another provider: Hospitalist    Complicating factors:  Care impacted by chronic illness: Chronic Kidney Disease, Diabetes, Hyperlipidemia, and Hypertension  Care affected by social determinants of health: N/A    Disposition considerations: Admit.      At the conclusion of the encounter I discussed the results of all the tests and the disposition. The questions were answered. The patient or family acknowledged understanding and was agreeable with the care plan.      MEDICATIONS GIVEN IN THE EMERGENCY DEPARTMENT:  Medications   glucose gel 15-30 g (has no administration in time range)     Or   dextrose 50 % injection 25-50 mL (has no administration in time range)     Or   glucagon injection 1 mg (has no administration in time range)   insulin glargine (LANTUS PEN) injection 12 Units (has no administration in time range)   insulin aspart (NovoLOG) injection (RAPID ACTING) (has no administration in time range)   insulin aspart (NovoLOG) injection (RAPID ACTING) (has no administration in time range)   insulin aspart (NovoLOG) injection (RAPID ACTING) (has no administration in time range)   insulin aspart (NovoLOG) injection (RAPID ACTING) (has no administration in time range)   insulin aspart (NovoLOG) injection (RAPID ACTING) (7 Units Subcutaneous $Given 7/20/24 2005)   insulin aspart (NovoLOG) injection (RAPID ACTING) (has no administration in time range)   lidocaine 1 % 0.1-1 mL (has no administration in time range)   lidocaine (LMX4) cream (has no administration in time range)   sodium chloride (PF) 0.9% PF flush 3 mL (has no  administration in time range)   sodium chloride (PF) 0.9% PF flush 3 mL (has no administration in time range)   senna-docusate (SENOKOT-S/PERICOLACE) 8.6-50 MG per tablet 1 tablet (has no administration in time range)     Or   senna-docusate (SENOKOT-S/PERICOLACE) 8.6-50 MG per tablet 2 tablet (has no administration in time range)   calcium carbonate (TUMS) chewable tablet 1,000 mg (has no administration in time range)   enoxaparin ANTICOAGULANT (LOVENOX) injection 40 mg (has no administration in time range)   lactated ringers infusion (has no administration in time range)   acetaminophen (TYLENOL) tablet 650 mg (has no administration in time range)     Or   acetaminophen (TYLENOL) Suppository 650 mg (has no administration in time range)   No Medication Sleep Aids for this Patient (has no administration in time range)   ondansetron (ZOFRAN ODT) ODT tab 4 mg (has no administration in time range)     Or   ondansetron (ZOFRAN) injection 4 mg (has no administration in time range)   prochlorperazine (COMPAZINE) injection 5 mg (has no administration in time range)     Or   prochlorperazine (COMPAZINE) tablet 5 mg (has no administration in time range)     Or   prochlorperazine (COMPAZINE) suppository 12.5 mg (has no administration in time range)   aspirin EC tablet 81 mg (has no administration in time range)   atorvastatin (LIPITOR) tablet 80 mg (has no administration in time range)   famotidine (PEPCID) tablet 20 mg (has no administration in time range)   gabapentin (NEURONTIN) capsule 300 mg (has no administration in time range)   isosorbide mononitrate (IMDUR) 24 hr half-tab 15 mg (has no administration in time range)   metFORMIN (GLUCOPHAGE XR) 24 hr tablet 2,000 mg (has no administration in time range)   metoprolol succinate ER (TOPROL XL) 24 hr tablet 25 mg (has no administration in time range)   sodium chloride 0.9% BOLUS 500 mL (0 mLs Intravenous Stopped 7/20/24 1706)   acetaminophen (TYLENOL) tablet 975 mg (975 mg  "Oral $Given 7/20/24 1620)   sodium chloride 0.9% BOLUS 2,000 mL (0 mLs Intravenous Stopped 7/20/24 9724)   insulin glargine (LANTUS PEN) injection 12 Units (12 Units Subcutaneous $Given 7/20/24 2006)       NEW PRESCRIPTIONS STARTED AT TODAY'S ED VISIT:  New Prescriptions    No medications on file       HPI     Jenise Paz is a 79 year old female with a pertinent history of DM II, HTN, HLD, s/p CABG (2018), CAD, CKD stage 2,  who presents to this ED by walk in for evaluation of chest pain, abdominal pain and fatigue.     Per patient via family member, the patient endorses fatigue, decreased appetite and shortness of breath for the past few weeks, but symptoms worsened this week. She was coughing and then vomited last night, recently giving over a cold, but this has improved. She endorses chest pain and right sided abdominal pain as well, with this pain being \"sporadic.\" No nausea currently, but is feeling thirsty. Patient denies dysuria or hematuria.     REVIEW OF SYSTEMS:  See HPI      Medical History     Past Medical History:   Diagnosis Date    CAD (coronary artery disease)     Chronic kidney disease, stage 2 (mild) 03/22/2023    Diabetes (H)     Glaucoma suspect, unspecified laterality     Hyperlipidemia 10/13/2016    Hypertension        Past Surgical History:   Procedure Laterality Date    CATARACT EXTRACTION      CORONARY ARTERY BYPASS      CYST REMOVAL      on neck    OVARIAN CYST REMOVAL      ZZC LAP,CHOLECYSTECTOMY/EXPLORE N/A 04/25/2018    Procedure: CHOLECYSTECTOMY, LAPAROSCOPIC;  Surgeon: Devan De Leon MD;  Location: [facility];  Service: General       Family History   Problem Relation Age of Onset    Diabetes No family hx of     Cancer No family hx of     Heart Disease No family hx of     Glaucoma No family hx of     Macular Degeneration No family hx of        Social History     Tobacco Use    Smoking status: Never     Passive exposure: Never    Smokeless tobacco: Never    Tobacco comments:     " "Children smoke outside of the home   Vaping Use    Vaping status: Never Used   Substance Use Topics    Alcohol use: No    Drug use: No       ACETAMINOPHEN EXTRA STRENGTH 500 MG tablet  alendronate (FOSAMAX) 70 MG tablet  aspirin (ASA) 81 MG EC tablet  atorvastatin (LIPITOR) 80 MG tablet  famotidine (PEPCID) 20 MG tablet  gabapentin (NEURONTIN) 300 MG capsule  insulin glargine (LANTUS PEN) 100 UNIT/ML pen  isosorbide mononitrate (IMDUR) 30 MG 24 hr tablet  metFORMIN (GLUCOPHAGE XR) 500 MG 24 hr tablet  metoprolol succinate ER (TOPROL XL) 25 MG 24 hr tablet  vitamin D3 (CHOLECALCIFEROL) 50 mcg (2000 units) tablet  CONTOUR NEXT TEST test strip  Microlet Lancets MISC        Physical Exam     Vitals:  /63   Pulse 71   Temp 98.7  F (37.1  C) (Oral)   Resp 16   Ht 1.499 m (4' 11\")   Wt 55.3 kg (122 lb)   SpO2 96%   BMI 24.64 kg/m      PHYSICAL EXAM:   Physical Exam  Vitals and nursing note reviewed.   Constitutional:       General: She is not in acute distress.     Appearance: Normal appearance.   HENT:      Head: Normocephalic and atraumatic.      Nose: Nose normal.      Mouth/Throat:      Mouth: Mucous membranes are moist.   Eyes:      Pupils: Pupils are equal, round, and reactive to light.   Neck:      Vascular: No JVD.   Cardiovascular:      Rate and Rhythm: Normal rate and regular rhythm.      Pulses: Normal pulses.           Radial pulses are 2+ on the right side and 2+ on the left side.        Dorsalis pedis pulses are 2+ on the right side and 2+ on the left side.   Pulmonary:      Effort: Pulmonary effort is normal. No respiratory distress.      Breath sounds: Normal breath sounds.   Abdominal:      Palpations: Abdomen is soft.      Tenderness: There is no abdominal tenderness.   Musculoskeletal:      Cervical back: Full passive range of motion without pain and neck supple.      Comments: No calf tenderness or swelling b/l   Skin:     General: Skin is warm.      Findings: No rash.   Neurological:      " General: No focal deficit present.      Mental Status: She is alert. Mental status is at baseline.      Comments: Fluent speech, no acute lateralizing deficits   Psychiatric:         Mood and Affect: Mood normal.         Behavior: Behavior normal.           Results     LAB:  All pertinent labs reviewed and interpreted  Labs Ordered and Resulted from Time of ED Arrival to Time of ED Departure   BASIC METABOLIC PANEL - Abnormal       Result Value    Sodium 123 (*)     Potassium 4.9      Chloride 85 (*)     Carbon Dioxide (CO2) 24      Anion Gap 14      Urea Nitrogen 20.7      Creatinine 0.64      GFR Estimate >90      Calcium 8.8      Glucose 1,032 (*)    HEPATIC FUNCTION PANEL - Abnormal    Protein Total 7.1      Albumin 3.6      Bilirubin Total 0.5      Alkaline Phosphatase 207 (*)     AST 14      ALT 15      Bilirubin Direct <0.20     LIPASE - Abnormal    Lipase 72 (*)    ROUTINE UA WITH MICROSCOPIC REFLEX TO CULTURE - Abnormal    Color Urine Colorless      Appearance Urine Clear      Glucose Urine >1000 (*)     Bilirubin Urine Negative      Ketones Urine Negative      Specific Gravity Urine 1.028      Blood Urine Negative      pH Urine 6.5      Protein Albumin Urine Negative      Urobilinogen Urine <2.0      Nitrite Urine Positive (*)     Leukocyte Esterase Urine Negative      Bacteria Urine Few (*)     Mucus Urine Present (*)     RBC Urine <1      WBC Urine 3     CBC WITH PLATELETS AND DIFFERENTIAL - Abnormal    WBC Count 6.2      RBC Count 5.60 (*)     Hemoglobin 11.7      Hematocrit 37.9      MCV 68 (*)     MCH 20.9 (*)     MCHC 30.9 (*)     RDW 14.3      Platelet Count 210      % Neutrophils 77      % Lymphocytes 16      % Monocytes 6      % Eosinophils 0      % Basophils 0      % Immature Granulocytes 0      NRBCs per 100 WBC 0      Absolute Neutrophils 4.8      Absolute Lymphocytes 1.0      Absolute Monocytes 0.4      Absolute Eosinophils 0.0      Absolute Basophils 0.0      Absolute Immature Granulocytes  0.0      Absolute NRBCs 0.0     BLOOD GAS VENOUS - Abnormal    pH Venous 7.36      pCO2 Venous 46      pO2 Venous 32      Bicarbonate Venous 26      Base Excess/Deficit Venous 0.8      FIO2 21      Oxyhemoglobin Venous 59 (*)     O2 Sat, Venous 60.5 (*)    HEMOGLOBIN A1C - Abnormal    Hemoglobin A1C >20.1 (*)    CBC WITH PLATELETS - Abnormal    WBC Count 5.7      RBC Count 4.97      Hemoglobin 10.3 (*)     Hematocrit 33.2 (*)     MCV 67 (*)     MCH 20.7 (*)     MCHC 31.0 (*)     RDW 14.6      Platelet Count 160     GLUCOSE BY METER - Abnormal    GLUCOSE BY METER POCT >600 (*)    BASIC METABOLIC PANEL - Abnormal    Sodium 129 (*)     Potassium 4.5      Chloride 96 (*)     Carbon Dioxide (CO2) 22      Anion Gap 11      Urea Nitrogen 18.5      Creatinine 0.56      GFR Estimate >90      Calcium 8.0 (*)     Glucose 706 (*)    TROPONIN T, HIGH SENSITIVITY - Normal    Troponin T, High Sensitivity 14     MAGNESIUM - Normal    Magnesium 2.1     KETONE BETA-HYDROXYBUTYRATE QUANTITATIVE, RAPID - Normal    Ketone (Beta-Hydroxybutyrate) Quantitative <0.18     TROPONIN T, HIGH SENSITIVITY - Normal    Troponin T, High Sensitivity 14     MAGNESIUM - Normal    Magnesium 1.9     GLUCOSE MONITOR NURSING POCT   GLUCOSE MONITOR NURSING POCT   BASIC METABOLIC PANEL   BASIC METABOLIC PANEL   URINE CULTURE       RADIOLOGY:  Chest XR,  PA & LAT   Final Result   IMPRESSION: Sternotomy with mediastinal clips. Heart size and pulmonary vascularity within normal limits. Minimal hazy infiltrate or atelectasis right lung base. Aortic calcification. Thoracolumbar curve with hypertrophic changes.                   ECG:  NSR, rate 77, normal intervals, no acute ischemia and similar to EKG from 7/24/22    I have independently reviewed and interpreted the EKG(s) documented above     PROCEDURES:  Procedures:  none      FINAL IMPRESSION:    ICD-10-CM    1. Type 2 diabetes mellitus with hyperglycemia, with long-term current use of insulin (H)  E11.65      Z79.4       2. Pseudohyponatremia  R79.89       3. Other fatigue  R53.83       4. Noncompliance with medication regimen  Z91.148           CRITICAL CARE  0 minutes of critical care time      I, Caity Mandujano, am serving as a scribe to document services personally performed by Dr. Guru Mazariegos, based on my observations and the provider's statements to me. I, Guru Mazariegos, DO attest that Caity Mandujano is acting in a scribe capacity, has observed my performance of the services and has documented them in accordance with my direction.      Guru Mazariegos DO  Emergency Medicine  [facility] EMERGENCY DEPARTMENT  7/20/2024  3:53 PM          Guru Mazariegos MD  07/20/24 2023

## 2024-07-20 NOTE — H&P
[facility]    History and Physical - Hospitalist Service       Date of Admission:  7/20/2024    Assessment & Plan      Jenise Paz is a 79 year old female admitted on 7/20/2024. She has a history of DMII, CAD s/p CABG in 2018, HTN, HLD, and GERD and is admitted for several weeks of progressively worsening fatigue, intermittent CP, and exertional SOB.     Hyperglycemia   Diabetes Mellitus Type II  Pseudohyponatremia   Diabetic Neuropathy   Medication noncompliance   History of T2DM and noncompliant with home insulin. BG of >1000 in the ED. A1c >20%. Non-ketotic and no anion gap. Urine positive for glucose. VBG WNL. Patient received 2500 mL of NS in the emergency department. Sodium of 123 corrects to 137.   - Admit to inpatient   - Basal insulin:  Lantus 12 unit(s) at bedtime   - Bolus insulin: Give Novolog before meals with a carb ratio of 1u:10g  - Medium resistance sliding scale insulin   - Continue PTA gabapentin 300 mg BID   - Maintenance fluids with  mL/hr.   - Carb controlled diet   - Recheck BMP at 0100 and then daily     Chest pain, intermittent  Coronary Artery Disease   Complaining of intermittent, non-exertional, throbbing chest pain along sternotomy scar with radiation to left shoulder and epigastric region. No nausea or diaphoresis. +SOB on exertion. The patient has a known history of CAD with angiogram in April of 2016 demonstrating multivessel CAD. S/p CABG in 2018. Most recent Echo in 2018 showed abnormal LV septal movement, EF of 61%, and mild mitral regurgitation. Echo report from 2018 noted persistent sternotomy discomfort secondary to keloids. She was worked up in the emergency department for chest pain. Troponin was not elevated on first draw. EKG unchanged from previous. Patient's intermittent chest pain may be stable angina, GERD, or sternotomy keloid pain (which had been previously noted). Low suspicions for ACS given stable troponin and EKG.   - Repeat troponin was  unchanged.   - Continue PTA medications including aspirin 81 mg, atorvastatin 80 mg, Imdur 30 mg, and metoprolol 25 mg.     Exertional SOB  SOB is largely exertional, improves with rest and is not directly associated with any chest pain. CXR demonstrates minimal hazy infiltrate or atelectasis in the right lung base. No respiratory distress or respiratory symptoms at present. Saturations are stable on RA. Normal white count. Low suspicion for bacterial infection or pulmonary edema. Patient had a recent upper respiratory infection this last week that is resolving which is the most likely explanation for dyspnea.   - Monitor for new or worsening SOB  - Monitor for new fever or white count     CKD Stage II   - Getting maintenance fluids  - Monitor Cr with daily BMP     GERD    - Continue PTA famotidine prn     Mild protein calorie malnutrition   Family reports decreased appetite and some weight loss over the last year. Likely related to severe hyperglycemia and uncontrolled diabetes.   - Treat medical issues as above         Diet:  Carb consistent   DVT Prophylaxis: Enoxaparin (Lovenox) SQ  Toth Catheter: Not present  Fluids: Maintenance LR   Lines: None     Cardiac Monitoring: None  Code Status:  Full Code    Clinically Significant Risk Factors Present on Admission         # Hyponatremia: Lowest Na = 123 mmol/L in last 2 days, will monitor as appropriate        # Drug Induced Platelet Defect: home medication list includes an antiplatelet medication   # Hypertension: Noted on problem list                        Disposition Plan      Expected Discharge Date: 07/22/2024                The patient's care was discussed with the Attending Physician, Dr. Sifuentes .      Malini Salas DO  Hospitalist Service  [facility]  Securely message with [employer] (more info)  Text page via [facility] Paging/Directory   ______________________________________________________________________    Chief Complaint  "  Fatigue, CP, SOB    History is obtained from the patient and her daughter at bedside.     History of Present Illness   Jenise Paz is a 79 year old female admitted on 7/20/2024. She has a history of DMII, CAD s/p CABG in 2018, HTN, HLD, and GERD and is admitted for several weeks of progressively worsening fatigue, CP, and SOB.     The patient's daughter provides the majority of history and reports that for the last week her mother had been complaining of worsening fatigue and feeling \"dehydrated\". She had also experienced some exertional SOB and intermittent, throbbing chest pain. Her daughter notes that the patient complained of this throbbing chest pain along her sternotomy scar/mid chest with some radiation into her left shoulder and epigastric region. The patient denies nausea or diaphoresis. Over the last week the patient had been complaining of feeling more thirsty than usual and was concerned she had become dehydrated which was contributing to her fatigue which is what prompted her ED visit. Denies polyuria or other urinary symptoms. She did have an upper respiratory infection this last week with mild cough and subjective fever that improved with Tylenol. She had one episode of emesis with coughing. Denies dyspnea, sore throat, or rhinorrhea. Her cough and cold have been improving.     Her daughter notes that the patient is non-compliant with her insulin at home and the patient explains that she does not feel that her insulin helps so she does not take it. Blood glucose levels were >1000 in the ED. She experiences bilateral lower extremity neuropathy with numbness and dysesthesia that is worse when standing for long periods of time. Discussed with the patient the importance of taking her insulin in addition to her PO medications for prevention of worsening CAD, CKD, and other pathology related to her uncontrolled blood sugars.         Past Medical History    Past Medical History:   Diagnosis Date    CAD " (coronary artery disease)     Chronic kidney disease, stage 2 (mild) 03/22/2023    Diabetes (H)     Hyperlipidemia 10/13/2016    Hypertension        Past Surgical History   Past Surgical History:   Procedure Laterality Date    CATARACT EXTRACTION      CYST REMOVAL      on neck    OVARIAN CYST REMOVAL      ZZC LAP,CHOLECYSTECTOMY/EXPLORE N/A 04/25/2018    Procedure: CHOLECYSTECTOMY, LAPAROSCOPIC;  Surgeon: Devan De Leon MD;  Location: [facility];  Service: General       Prior to Admission Medications   Prior to Admission Medications   Prescriptions Last Dose Informant Patient Reported? Taking?   ACETAMINOPHEN EXTRA STRENGTH 500 MG tablet Unknown at prn  No Yes   Sig: TAKE 2 TABLETS BY MOUTH THREE TIMES DAILY AS NEEDED   CONTOUR NEXT TEST test strip   No No   Sig: TEST BLOOD SUGAR TWICE DAILY OR AS DIRECTED   Microlet Lancets MISC   No No   Sig: USE TO TEST TWICE DAILY OR AS DIRECTED.   alendronate (FOSAMAX) 70 MG tablet Past Week at unknown , past couple weeks  No Yes   Sig: Take 1 tablet (70 mg) by mouth every 7 days   aspirin (ASA) 81 MG EC tablet 7/20/2024 at am  No Yes   Sig: Take 1 tablet (81 mg) by mouth daily   atorvastatin (LIPITOR) 80 MG tablet 7/19/2024 at pm  No Yes   Sig: TAKE 1 TABLET(80 MG) BY MOUTH DAILY   famotidine (PEPCID) 20 MG tablet Unknown at prn  No Yes   Sig: TAKE 1 TABLET BY MOUTH 2 TIMES DAILY AS NEEDED   gabapentin (NEURONTIN) 300 MG capsule 7/20/2024 at am  No Yes   Sig: TAKE 1 CAPSULE(300 MG) BY MOUTH TWICE DAILY   insulin glargine (LANTUS PEN) 100 UNIT/ML pen More than a month  No Yes   Sig: Inject 26 Units Subcutaneous At Bedtime   isosorbide mononitrate (IMDUR) 30 MG 24 hr tablet 7/20/2024 at am  No Yes   Sig: TAKE 1/2 TABLET(15 MG) BY MOUTH EVERY DAY   metFORMIN (GLUCOPHAGE XR) 500 MG 24 hr tablet 7/19/2024 at pm  No Yes   Sig: TAKE 4 TABLETS BY MOUTH EVERY DAY WITH FOOD   metoprolol succinate ER (TOPROL XL) 25 MG 24 hr tablet 7/20/2024 at am  No Yes   Sig: TAKE 1 TABLET BY  MOUTH EVERY DAY   vitamin D3 (CHOLECALCIFEROL) 50 mcg (2000 units) tablet 7/20/2024 at am  No Yes   Sig: Take 1 tablet (50 mcg) by mouth daily      Facility-Administered Medications: None      Social History   I have reviewed this patient's social history and updated it with pertinent information if needed.  Social History     Tobacco Use    Smoking status: Never     Passive exposure: Never    Smokeless tobacco: Never    Tobacco comments:     Children smoke outside of the home   Vaping Use    Vaping status: Never Used   Substance Use Topics    Alcohol use: No    Drug use: No        Physical Exam   Vital Signs: Temp: 98.7  F (37.1  C) Temp src: Oral BP: 116/56 Pulse: 64   Resp: 16 SpO2: 92 % O2 Device: None (Room air)    Weight: 122 lbs 0 oz  Exam:  Constitutional: healthy, alert, and no distress  Head: Normocephalic. No masses, lesions, tenderness or abnormalities  Neck: Neck supple. No adenopathy. Thyroid symmetric, normal size,, Carotids without bruits.  Cardiovascular: Sternotomy scar present. No lifts, heaves, or thrills. RRR. No murmurs, clicks gallops or rub  Respiratory: No respiratory distress. Speaks in full sentences. Mild creackles in the right lower lobe.   Gastrointestinal: Abdomen soft, non-tender. BS normal. No masses, organomegaly  : Deferred  Musculoskeletal: extremities normal- no gross deformities noted, normal muscle tone, and peripheral pulses normal. No pedal edema.   Skin: no suspicious lesions or rashes  Neurologic: A&Ox3. EOMI. Cranial nerves II-VII grossly intact. Good strength and light touch sensation in the bilateral upper and lower extremities.   Psychiatric: mentation appears normal and affect normal/bright    Medical Decision Making       Tests ORDERED & REVIEWED in the past 24 hours:      Data     I have personally reviewed the following data over the past 24 hrs:    6.2  \   11.7   / 210     123 (L) 85 (L) 20.7 /  >600 (HH)   4.9 24 0.64 \     ALT: 15 AST: 14 AP: 207 (H) TBILI:  0.5   ALB: 3.6 TOT PROTEIN: 7.1 LIPASE: 72 (H)     Trop: 14 BNP: N/A       Imaging results reviewed over the past 24 hrs:   Recent Results (from the past 24 hour(s))   Chest XR,  PA & LAT    Narrative    EXAM: XR CHEST 2 VIEWS  LOCATION: [facility]  DATE: 7/20/2024    INDICATION: Chest pain.  COMPARISON: 4/25/2019.      Impression    IMPRESSION: Sternotomy with mediastinal clips. Heart size and pulmonary vascularity within normal limits. Minimal hazy infiltrate or atelectasis right lung base. Aortic calcification. Thoracolumbar curve with hypertrophic changes.

## 2024-07-20 NOTE — ED TRIAGE NOTES
Pt arrives to triage with c/o fatigue, sob and chest pain all over for the last week. HX: bypass 2018, DM, gallbladder removal     Triage Assessment (Adult)       Row Name 07/20/24 6512          Triage Assessment    Airway WDL WDL        Respiratory WDL    Respiratory WDL X;cough     Rhythm/Pattern, Respiratory dyspnea upon exertion;shortness of breath     Cough Frequency infrequent     Cough Type nonproductive        Skin Circulation/Temperature WDL    Skin Circulation/Temperature WDL WDL        Cardiac WDL    Cardiac WDL WDL        Peripheral/Neurovascular WDL    Peripheral Neurovascular WDL WDL        Cognitive/Neuro/Behavioral WDL    Cognitive/Neuro/Behavioral WDL WDL

## 2024-07-21 ENCOUNTER — APPOINTMENT (OUTPATIENT)
Dept: OCCUPATIONAL THERAPY | Facility: HOSPITAL | Age: 79
End: 2024-07-21
Payer: COMMERCIAL

## 2024-07-21 ENCOUNTER — APPOINTMENT (OUTPATIENT)
Dept: CARDIOLOGY | Facility: HOSPITAL | Age: 79
End: 2024-07-21
Payer: COMMERCIAL

## 2024-07-21 ENCOUNTER — APPOINTMENT (OUTPATIENT)
Dept: PHYSICAL THERAPY | Facility: HOSPITAL | Age: 79
End: 2024-07-21
Payer: COMMERCIAL

## 2024-07-21 LAB
ANION GAP SERPL CALCULATED.3IONS-SCNC: 8 MMOL/L (ref 7–15)
ANION GAP SERPL CALCULATED.3IONS-SCNC: 9 MMOL/L (ref 7–15)
BUN SERPL-MCNC: 13.1 MG/DL (ref 8–23)
BUN SERPL-MCNC: 14.6 MG/DL (ref 8–23)
CALCIUM SERPL-MCNC: 8.4 MG/DL (ref 8.8–10.4)
CALCIUM SERPL-MCNC: 8.4 MG/DL (ref 8.8–10.4)
CHLORIDE SERPL-SCNC: 103 MMOL/L (ref 98–107)
CHLORIDE SERPL-SCNC: 106 MMOL/L (ref 98–107)
CREAT SERPL-MCNC: 0.53 MG/DL (ref 0.51–0.95)
CREAT SERPL-MCNC: 0.54 MG/DL (ref 0.51–0.95)
EGFRCR SERPLBLD CKD-EPI 2021: >90 ML/MIN/1.73M2
EGFRCR SERPLBLD CKD-EPI 2021: >90 ML/MIN/1.73M2
ERYTHROCYTE [DISTWIDTH] IN BLOOD BY AUTOMATED COUNT: 14.2 % (ref 10–15)
GLUCOSE BLDC GLUCOMTR-MCNC: 112 MG/DL (ref 70–99)
GLUCOSE BLDC GLUCOMTR-MCNC: 116 MG/DL (ref 70–99)
GLUCOSE BLDC GLUCOMTR-MCNC: 191 MG/DL (ref 70–99)
GLUCOSE BLDC GLUCOMTR-MCNC: 194 MG/DL (ref 70–99)
GLUCOSE BLDC GLUCOMTR-MCNC: 215 MG/DL (ref 70–99)
GLUCOSE BLDC GLUCOMTR-MCNC: 279 MG/DL (ref 70–99)
GLUCOSE BLDC GLUCOMTR-MCNC: 85 MG/DL (ref 70–99)
GLUCOSE SERPL-MCNC: 123 MG/DL (ref 70–99)
GLUCOSE SERPL-MCNC: 94 MG/DL (ref 70–99)
HCO3 SERPL-SCNC: 26 MMOL/L (ref 22–29)
HCO3 SERPL-SCNC: 26 MMOL/L (ref 22–29)
HCT VFR BLD AUTO: 33.4 % (ref 35–47)
HGB BLD-MCNC: 10.2 G/DL (ref 11.7–15.7)
LVEF ECHO: NORMAL
MCH RBC QN AUTO: 20.5 PG (ref 26.5–33)
MCHC RBC AUTO-ENTMCNC: 30.5 G/DL (ref 31.5–36.5)
MCV RBC AUTO: 67 FL (ref 78–100)
PLATELET # BLD AUTO: 189 10E3/UL (ref 150–450)
POTASSIUM SERPL-SCNC: 3.5 MMOL/L (ref 3.4–5.3)
POTASSIUM SERPL-SCNC: 3.5 MMOL/L (ref 3.4–5.3)
RBC # BLD AUTO: 4.98 10E6/UL (ref 3.8–5.2)
SODIUM SERPL-SCNC: 138 MMOL/L (ref 135–145)
SODIUM SERPL-SCNC: 140 MMOL/L (ref 135–145)
WBC # BLD AUTO: 7.4 10E3/UL (ref 4–11)

## 2024-07-21 PROCEDURE — 82962 GLUCOSE BLOOD TEST: CPT

## 2024-07-21 PROCEDURE — 258N000003 HC RX IP 258 OP 636

## 2024-07-21 PROCEDURE — 97165 OT EVAL LOW COMPLEX 30 MIN: CPT | Mod: GO

## 2024-07-21 PROCEDURE — G0378 HOSPITAL OBSERVATION PER HR: HCPCS

## 2024-07-21 PROCEDURE — 80048 BASIC METABOLIC PNL TOTAL CA: CPT

## 2024-07-21 PROCEDURE — 96372 THER/PROPH/DIAG INJ SC/IM: CPT

## 2024-07-21 PROCEDURE — 93306 TTE W/DOPPLER COMPLETE: CPT

## 2024-07-21 PROCEDURE — 97161 PT EVAL LOW COMPLEX 20 MIN: CPT | Mod: GP

## 2024-07-21 PROCEDURE — 99222 1ST HOSP IP/OBS MODERATE 55: CPT | Mod: GC

## 2024-07-21 PROCEDURE — 93306 TTE W/DOPPLER COMPLETE: CPT | Mod: 26 | Performed by: INTERNAL MEDICINE

## 2024-07-21 PROCEDURE — 85014 HEMATOCRIT: CPT

## 2024-07-21 PROCEDURE — 250N000013 HC RX MED GY IP 250 OP 250 PS 637

## 2024-07-21 PROCEDURE — 80048 BASIC METABOLIC PNL TOTAL CA: CPT | Mod: 91

## 2024-07-21 PROCEDURE — 36415 COLL VENOUS BLD VENIPUNCTURE: CPT

## 2024-07-21 PROCEDURE — 250N000011 HC RX IP 250 OP 636

## 2024-07-21 RX ADMIN — INSULIN ASPART 2 UNITS: 100 INJECTION, SOLUTION INTRAVENOUS; SUBCUTANEOUS at 11:59

## 2024-07-21 RX ADMIN — SODIUM CHLORIDE, POTASSIUM CHLORIDE, SODIUM LACTATE AND CALCIUM CHLORIDE: 600; 310; 30; 20 INJECTION, SOLUTION INTRAVENOUS at 13:47

## 2024-07-21 RX ADMIN — ASPIRIN 81 MG: 81 TABLET, COATED ORAL at 09:16

## 2024-07-21 RX ADMIN — ATORVASTATIN CALCIUM 80 MG: 40 TABLET, FILM COATED ORAL at 19:32

## 2024-07-21 RX ADMIN — INSULIN ASPART 2 UNITS: 100 INJECTION, SOLUTION INTRAVENOUS; SUBCUTANEOUS at 17:32

## 2024-07-21 RX ADMIN — METFORMIN ER 500 MG 2000 MG: 500 TABLET ORAL at 17:35

## 2024-07-21 RX ADMIN — ISOSORBIDE MONONITRATE 15 MG: 30 TABLET, EXTENDED RELEASE ORAL at 09:16

## 2024-07-21 RX ADMIN — ENOXAPARIN SODIUM 40 MG: 40 INJECTION SUBCUTANEOUS at 19:34

## 2024-07-21 RX ADMIN — SODIUM CHLORIDE, POTASSIUM CHLORIDE, SODIUM LACTATE AND CALCIUM CHLORIDE: 600; 310; 30; 20 INJECTION, SOLUTION INTRAVENOUS at 21:55

## 2024-07-21 RX ADMIN — GABAPENTIN 300 MG: 300 CAPSULE ORAL at 09:16

## 2024-07-21 RX ADMIN — GABAPENTIN 300 MG: 300 CAPSULE ORAL at 19:32

## 2024-07-21 RX ADMIN — METOPROLOL SUCCINATE 25 MG: 25 TABLET, EXTENDED RELEASE ORAL at 09:16

## 2024-07-21 RX ADMIN — SODIUM CHLORIDE, POTASSIUM CHLORIDE, SODIUM LACTATE AND CALCIUM CHLORIDE: 600; 310; 30; 20 INJECTION, SOLUTION INTRAVENOUS at 05:41

## 2024-07-21 ASSESSMENT — ACTIVITIES OF DAILY LIVING (ADL)
ADLS_ACUITY_SCORE: 44
ADLS_ACUITY_SCORE: 38
ADLS_ACUITY_SCORE: 36
ADLS_ACUITY_SCORE: 38
ADLS_ACUITY_SCORE: 35
DEPENDENT_IADLS:: CLEANING;COOKING;LAUNDRY;TRANSPORTATION;MEAL PREPARATION;SHOPPING
ADLS_ACUITY_SCORE: 44
ADLS_ACUITY_SCORE: 44
ADLS_ACUITY_SCORE: 36
ADLS_ACUITY_SCORE: 36
ADLS_ACUITY_SCORE: 38
ADLS_ACUITY_SCORE: 44
ADLS_ACUITY_SCORE: 38
ADLS_ACUITY_SCORE: 36
ADLS_ACUITY_SCORE: 38
ADLS_ACUITY_SCORE: 44
ADLS_ACUITY_SCORE: 44
ADLS_ACUITY_SCORE: 36
ADLS_ACUITY_SCORE: 35
ADLS_ACUITY_SCORE: 44

## 2024-07-21 NOTE — PLAN OF CARE
"  Problem: Adult Inpatient Plan of Care  Goal: Plan of Care Review  Description: The Plan of Care Review/Shift note should be completed every shift.  The Outcome Evaluation is a brief statement about your assessment that the patient is improving, declining, or no change.  This information will be displayed automatically on your shift  note.  Outcome: Progressing  Goal: Patient-Specific Goal (Individualized)  Description: You can add care plan individualizations to a care plan. Examples of Individualization might be:  \"Parent requests to be called daily at 9am for status\", \"I have a hard time hearing out of my right ear\", or \"Do not touch me to wake me up as it startles  me\".  Outcome: Progressing  Goal: Absence of Hospital-Acquired Illness or Injury  Outcome: Progressing  Goal: Optimal Comfort and Wellbeing  Outcome: Progressing  Goal: Readiness for Transition of Care  Outcome: Progressing     Problem: Risk for Delirium  Goal: Optimal Coping  Outcome: Progressing  Goal: Improved Behavioral Control  Outcome: Progressing  Goal: Improved Attention and Thought Clarity  Outcome: Progressing  Goal: Improved Sleep  Outcome: Progressing     Problem: Skin Injury Risk Increased  Goal: Skin Health and Integrity  Outcome: Progressing   Goal Outcome Evaluation:         Pt is alert and oriented, family member at the bedside help with , , 5unit aspart was given, denies pain, VSS and will continue to monitor.                "

## 2024-07-21 NOTE — PROGRESS NOTES
"   07/21/24 1130   Appointment Info   Signing Clinician's Name / Credentials (PT) Gabriella Whitley,PT       Present yes   Language family- Hmong   Living Environment   People in Home child(george), adult;other (see comments)  (son and daughter- in -law)   Current Living Arrangements house  (lives on one level)   Home Accessibility stairs to enter home   Number of Stairs, Main Entrance other (see comments)  (3-10 stairs depending the enterance)   Living Environment Comments someone is with pt all times, pt has low vision   Self-Care   Equipment Currently Used at Home cane, straight;walker, rolling   Activity/Exercise/Self-Care Comment A with all ADLS   General Information   Onset of Illness/Injury or Date of Surgery 07/20/24   Referring Physician Dr. Navin García   Patient/Family Therapy Goals Statement (PT) none stated   Pertinent History of Current Problem (include personal factors and/or comorbidities that impact the POC) Jenise Paz is a 79 year old female admitted on 7/20/2024. She has a history of DMII, CAD s/p CABG in 2018, HTN, HLD, and GERD and is admitted for several weeks of progressively worsening fatigue, CP, and SOB.      The patient's daughter provides the majority of history and reports that for the last week her mother had been complaining of worsening fatigue and feeling \"dehydrated\". She had also experienced some exertional SOB and intermittent, throbbing chest pain. Her daughter notes that the patient complained of this throbbing chest pain along her sternotomy scar/mid chest with some radiation into her left shoulder and epigastric region. Her daughter notes that the patient is non-compliant with her insulin at home and the patient explains that she does not feel that her insulin helps so she does not take it. Blood glucose levels were >1000 in the ED. She experiences bilateral lower extremity neuropathy with numbness and dysesthesia that is worse when standing for long " periods of time.   General Observations pt in bed agreeable to PT   Cognition   Affect/Mental Status (Cognition) WFL   Pain Assessment   Patient Currently in Pain Yes, see Vital Sign flowsheet  (stomach discomfort)   Range of Motion (ROM)   ROM Comment BLE WFL   Strength (Manual Muscle Testing)   Strength Comments BLE WFL   Bed Mobility   Comment, (Bed Mobility) family CGA supine>sit, SBA sit>supine   Transfers   Comment, (Transfers) sit<>stand SBA   Gait/Stairs (Locomotion)   [place] Level (Gait) contact guard   Assistive Device (Gait) walker, front-wheeled   Distance in Feet (Gait) 150'   Pattern (Gait) step-through   Comment, (Gait/Stairs) 12' HHA   Balance   Balance Comments no LOB with FWW   Clinical Impression   Criteria for Skilled Therapeutic Intervention Yes, treatment indicated   PT Diagnosis (PT) PT eval only   Influenced by the following impairments pt admit with weakness, SOBand fatigue   Functional limitations due to impairments NA   Clinical Presentation (PT Evaluation Complexity) stable   Clinical Presentation Rationale presents as medically diagnosed   Clinical Decision Making (Complexity) low complexity   Risk & Benefits of therapy have been explained evaluation/treatment results reviewed   PT Total Evaluation Time   PT Eval, Low Complexity Minutes (25793) 15   Physical Therapy Goals   PT Frequency Other (see comments)  (eval onlt)   PT Predicted Duration/Target Date for Goal Attainment 07/21/24   PT Goals PT Goal 1   PT: Goal 1 PT eval for safe discharge recommendations, goal met   PT Discharge Planning   PT Plan d/c PT   PT Discharge Recommendation (DC Rec) home with assist   PT Rationale for DC Rec pt Ax1 SBA/CGA for bed mob, transfers and gait, pt at baseline mobility   PT Brief overview of current status pt CGA/SBA for bed mob. transfer and gait w/ FWW or HHA   PT Equipment Needed at Discharge other (see comments)  (pt has SE cane and FWW at home)   Total Session Time   Total Session Time  (sum of timed and untimed services) 15

## 2024-07-21 NOTE — CONSULTS
Care Management Initial Consult    General Information  Assessment completed with: Children, Daughter- Afua  Type of CM/SW Visit: Initial Assessment    Primary Care Provider verified and updated as needed: Yes   Readmission within the last 30 days: no previous admission in last 30 days      Reason for Consult: discharge planning  Advance Care Planning: Advance Care Planning Reviewed: no concerns identified          Communication Assessment  Patient's communication style: spoken language (non-English)             Cognitive  Cognitive/Neuro/Behavioral: WDL                      Living Environment:   People in home: child(george), adult  Son, Montez and DIL, Saida  Current living Arrangements: house      Able to return to prior arrangements: yes       Family/Social Support:  Care provided by: self, other (see comments) (PCA, DIL-Saida)  Provides care for: no one  Marital Status:   Children          Description of Support System: Supportive, Involved         Current Resources:   Patient receiving home care services:       Community Resources:    Equipment currently used at home: cane, straight, walker, rolling  Supplies currently used at home:      Employment/Financial:  Employment Status:          Financial Concerns: none           Does the patient's insurance plan have a 3 day qualifying hospital stay waiver?  Yes     Which insurance plan 3 day waiver is available? Alternative insurance waiver    Will the waiver be used for post-acute placement? Undetermined at this time    Lifestyle & Psychosocial Needs:  Social Determinants of Health     Food Insecurity: Not on file   Depression: Not at risk (3/20/2023)    PHQ-2     PHQ-2 Score: 0   Housing Stability: Not on file   Tobacco Use: Low Risk  (7/20/2024)    Patient History     Smoking Tobacco Use: Never     Smokeless Tobacco Use: Never     Passive Exposure: Never   Financial Resource Strain: Not on file   Alcohol Use: Not on file   Transportation Needs: Not on file    Physical Activity: Not on file   Interpersonal Safety: Not on file   Stress: Not on file   Social Connections: Not on file   Health Literacy: Not on file       Functional Status:  Prior to admission patient needed assistance:   Dependent ADLs:: Independent  Dependent IADLs:: Cleaning, Cooking, Laundry, Transportation, Meal Preparation, Shopping       Mental Health Status:          Chemical Dependency Status:                Values/Beliefs:  Spiritual, Cultural Beliefs, Evangelical Practices, Values that affect care:                 Additional Information:  Assessed. Pt lives with son, Montez and Saida TEMPLE. MAVERICK is her PCA. Pt is largely independent with ADLs and some IADLs. Gets support from family. No CM needs anticipated.    Therapy recommendation is home with assist.    RNCM to follow for medical progression, recommendations, and final discharge plan.      Gini Rivera RN

## 2024-07-21 NOTE — PROGRESS NOTES
Psychiatric  OUTPATIENT PHYSICAL THERAPY EVALUATION  PLAN OF TREATMENT FOR OUTPATIENT REHABILITATION  (COMPLETE FOR INITIAL CLAIMS ONLY)  Patient's Last Name, First Name, M.I.  YOB: 1945  Jenise Paz                           Provider's Name  Psychiatric Medical Record No.  [id number]                             Onset Date:  07/20/24   Start of Care Date:      Type:     _X_PT   ___OT   ___SLP Medical Diagnosis:                 PT Diagnosis:  PT eval only Visits from SOC:  1     See note for plan of treatment, functional goals and certification details    I CERTIFY THE NEED FOR THESE SERVICES FURNISHED UNDER        THIS PLAN OF TREATMENT AND WHILE UNDER MY CARE     (Physician co-signature of this document indicates review and certification of the therapy plan).

## 2024-07-21 NOTE — PLAN OF CARE
PRIMARY DIAGNOSIS: GENERALIZED WEAKNESS    OUTPATIENT/OBSERVATION GOALS TO BE MET BEFORE DISCHARGE  1. Orthostatic performed: No    2. Tolerating PO medications: Yes    3. Return to near baseline physical activity: No    4. Cleared for discharge by consultants (if involved): No    Discharge Planner Nurse   Safe discharge environment identified: No  Barriers to discharge: Yes       Entered by: Rajani Weeks RN 07/21/2024 4:29 PM     Please review provider order for any additional goals.   Nurse to notify provider when observation goals have been met and patient is ready for discharge.Goal Outcome Evaluation:

## 2024-07-21 NOTE — PROGRESS NOTES
07/21/24 1125   Appointment Info   Signing Clinician's Name / Credentials (OT) Vickialeks AGGARWAL lauren HIGGINS/CANDELARIA      Language Hmong, family interpreted   Living Environment   People in Home child(george), adult   Current Living Arrangements house   Home Accessibility stairs to enter home   Number of Stairs, Main Entrance other (see comments)  (either 3 or 10 depending on how pt enters home)   Transportation Anticipated family or friend will provide   Living Environment Comments Pt has assist at all times   Self-Care   Current Activity Tolerance moderate   Equipment Currently Used at Home cane, straight;walker, rolling;shower chair   Activity/Exercise/Self-Care Comment Pt has assist with all ADLs   General Information   Onset of Illness/Injury or Date of Surgery 07/20/24   Referring Physician Maritza   Patient/Family Therapy Goal Statement (OT) home   Additional Occupational Profile Info/Pertinent History of Current Problem Pt admitted iwth hyperglycemia   Existing Precautions/Restrictions fall   Limitations/Impairments visual   Cognitive Status Examination   Cognitive Status Comments pt is impulsive   Visual Perception   Impact of Vision Impairment on Function (Vision) impaired vision per daughter   Range of Motion Comprehensive   General Range of Motion no range of motion deficits identified   Strength Comprehensive (MMT)   General Manual Muscle Testing (MMT) Assessment no strength deficits identified   Bed Mobility   Comment (Bed Mobility) SBA   Transfers   Transfer Comments SBA/CGA with and without FWW   Balance   Balance Comments SBA/CGA   Activities of Daily Living   BADL Assessment/Intervention   (toileting SBA)   Clinical Impression   Criteria for Skilled Therapeutic Interventions Met (OT) Evaluation only   OT Diagnosis Impaired ADL independence   OT Problem List-Impairments impacting ADL balance;vision;mobility   Assessment of Occupational Performance 1-3 Performance Deficits   Risk & Benefits of  therapy have been explained evaluation/treatment results reviewed;participants included;patient;daughter   Clinical Impression Comments Pt seen bedside for OT eval.  Pt is CORTNEY/CGA with mobiltiy and has assist with ADLs as needed at home.  Pt appears to be at baseline.  No further OT indicated.  Recommend home iwth family support as needed.   OT Total Evaluation Time   OT Eval, Low Complexity Minutes (36637) 15   OT Discharge Planning   OT Plan Eval only   OT Discharge Recommendation (DC Rec) home with assist   OT Rationale for DC Rec Recommend home with 24 hour family assist as prior to hospital stay   OT Brief overview of current status SBA/CGA

## 2024-07-21 NOTE — PLAN OF CARE
Problem: Adult Inpatient Plan of Care  Goal: Plan of Care Review  Description: The Plan of Care Review/Shift note should be completed every shift.  The Outcome Evaluation is a brief statement about your assessment that the patient is improving, declining, or no change.  This information will be displayed automatically on your shift  note.  Outcome: Progressing  Flowsheets (Taken 7/21/2024 0407)  Plan of Care Reviewed With:   patient   child  Overall Patient Progress: improving     Problem: Comorbidity Management  Goal: Blood Glucose Levels Within Targeted Range  Outcome: Progressing  Intervention: Monitor and Manage Glycemia  Recent Flowsheet Documentation  Taken 7/21/2024 0200 by Naa Jones, RN  Medication Review/Management: medications reviewed   Goal Outcome Evaluation:      Plan of Care Reviewed With: patient, child    Overall Patient Progress: improvingOverall Patient Progress: improving     Pt A/OX3 denies pain or distress. Pt continues with LR at 125 ml/hr. Pt and daughter discussed about frequent checks of blood sugar to avoid high and lows and the symptoms. Both verbal understanding. 0400 BS is 112.

## 2024-07-21 NOTE — PROGRESS NOTES
[facility]    Progress Note - Hospitalist Service       Date of Admission:  7/20/2024    Assessment & Plan     Jenise Paz is a 79 year old female admitted on 7/20/2024. She has a history of DMII, CAD s/p CABG in 2018, HTN, HLD, and GERD and is admitted for several weeks of progressively worsening fatigue, and exertional CP/SOB.      Hyperglycemia   Diabetes Mellitus Type II  Lives with son and daughter in law who help with medications but currently are gone on vacation which may be contributing to medication non-compliance. BG of >1000 in the ED. A1c >20%. Non-ketotic and no anion gap. Urine positive for glucose. VBG WNL. Patient received 2500 mL of NS in the emergency department. Sodium of 123 corrects to 137. Plan to monitor sugars today with potential discharge tomorrow.   - Basal insulin:  PTA Lantus 12 unit(s) at bedtime decreased to 10U at bedtime while inpatient  - Bolus insulin: Give Novolog before meals with a carb ratio of 1u:10g  - Medium resistance sliding scale insulin   - Continue PTA gabapentin 300 mg BID   - Maintenance fluids with  mL/hr.   - Carb controlled diet   - Trend BMP     Chest pain, intermittent  Coronary Artery Disease   Complaining of intermittent, exertional dyspnea and chest pain. The patient has a known history of CAD with angiogram in April of 2016 demonstrating multivessel CAD. S/p CABG in 2018. Most recent Echo in 2018 showed abnormal LV septal movement, EF of 61%, and mild mitral regurgitation. Echo report from 2018 noted persistent sternotomy discomfort secondary to keloids. She was worked up in the emergency department for chest pain. Troponin was not elevated on first draw. EKG unchanged from previous. Patient's intermittent chest pain may be stable angina, GERD, or sternotomy keloid pain (which had been previously noted). Low suspicions for ACS given stable troponin and EKG.   - Continue PTA medications including aspirin 81 mg, atorvastatin 80  mg, Imdur 30 mg, and metoprolol 25 mg.   - Ordered ECHO    Asymptomatic Bacteruira  +Nitrites, >100,000 gram(-) bacilli     CKD Stage II   - Monitor Cr with daily BMP      GERD    - Continue PTA famotidine prn      Mild protein calorie malnutrition   Family reports decreased appetite and some weight loss over the last year. Likely related to severe hyperglycemia and uncontrolled diabetes.   - Treat medical issues as above         Diet: Combination Diet Regular Diet Adult; Moderate Consistent Carb (60 g CHO per Meal) Diet    DVT Prophylaxis: Enoxaparin (Lovenox) SQ  Toth Catheter: Not present  Fluids: PO  Lines: None     Cardiac Monitoring: None  Code Status: Full Code      Clinically Significant Risk Factors Present on Admission         # Hyponatremia: Lowest Na = 123 mmol/L in last 2 days, will monitor as appropriate  # Hypocalcemia: Lowest Ca = 8 mg/dL in last 2 days, will monitor and replace as appropriate       # Drug Induced Platelet Defect: home medication list includes an antiplatelet medication   # Hypertension: Noted on problem list    # Anemia: based on hgb <11          # DMII: A1C = >20.1 % (Ref range: <5.7 %) within past 6 months               Disposition Plan     Expected Discharge Date: 07/22/2024    Discharge Delays: PT Disposition recs needed  OT Disposition recs needed            The patient's care was discussed with the Attending Physician, Dr. García .    Yosvany Kimball MD  Hospitalist Service  [facility]  Securely message with [employer] (more info)  Text page via [employer] Paging/Directory   ______________________________________________________________________    Interval History     No acute events  ROS negative  Denies chest pain currently  Chronic blurry vision right greater than left and glaucoma surgery in both eyes    Physical Exam   Vital Signs: Temp: 98.5  F (36.9  C) Temp src: Oral BP: 118/59 Pulse: 69   Resp: 18 SpO2: 93 % O2 Device: None (Room air)     Weight: 122 lbs 0 oz    GEN: Pleasant female, in no acute distress.   HEENT: Normocephalic, atraumatic. Pupil size discrepancy R>L, but neuro exam otherwise normal. Extraoccular eye movements intact. Anicteric sclera. Moist mucous membranes.   PULM: Non-labored breathing. No use of accessory muscles. Clear to ausculation bilaterally. No wheezes or crackles.   CV: Regular rate and rhythm. Normal S1, S2. No rubs, murmurs, or gallops.    ABDOMEN: Normoactive bowel sounds. Non-tender to palpation. Non-distended.    EXTREMITES:  No clubbing, cyanosis, or edema.    SKIN: No rash on limited skin exam  NEURO:  Awake. Oriented to person, place, time and situation. Cranial nerves 2-12 grossly intact. Moving all extremities.    PSYCH: Calm. Appropriate affect, insight, judgment.          Data     I have personally reviewed the following data over the past 24 hrs:    7.4  \   10.2 (L)   / 189     140 106 13.1 /  191 (H)   3.5 26 0.54 \     ALT: 15 AST: 14 AP: 207 (H) TBILI: 0.5   ALB: 3.6 TOT PROTEIN: 7.1 LIPASE: 72 (H)     Trop: 14 BNP: 516     TSH: N/A T4: N/A A1C: >20.1 (H)       Imaging results reviewed over the past 24 hrs:   Recent Results (from the past 24 hour(s))   Chest XR,  PA & LAT    Narrative    EXAM: XR CHEST 2 VIEWS  LOCATION: [facility]  DATE: 7/20/2024    INDICATION: Chest pain.  COMPARISON: 4/25/2019.      Impression    IMPRESSION: Sternotomy with mediastinal clips. Heart size and pulmonary vascularity within normal limits. Minimal hazy infiltrate or atelectasis right lung base. Aortic calcification. Thoracolumbar curve with hypertrophic changes.

## 2024-07-21 NOTE — PLAN OF CARE
Physical Therapy Discharge Summary    Reason for therapy discharge:    All goals and outcomes met, no further needs identified.    Progress towards therapy goal(s). See goals on Care Plan in [facility] electronic health record for goal details.  Goals met    Therapy recommendation(s):    No further therapy is recommended.

## 2024-07-21 NOTE — UTILIZATION REVIEW
"Admission Status; Secondary Review Determination     Under the authority of the Utilization Management Committee, the utilization review process indicated a secondary review on the above patient.  The review outcome is based on review of the medical records, discussions with staff, and applying clinical experience noted on the date of the review.          (x) Observation Status Appropriate - This patient does not meet hospital inpatient criteria and is placed in observation status. If this patient's primary payer is Medicare and was admitted as an inpatient, Condition Code 44 should be used and patient status changed to \"observation\".     RATIONALE FOR DETERMINATION   78 yo female with insulin dependent diabetes, CAD, hypertension among other chronic conditions who presented for care on 7/20/24 for intermittent chest pain, worsening fatigue.  Family reports that the patient has not compliant with insulin at home because she does not feel that it works or does anything to help her.  Glucose upon presentation was greater than 1000.  Hemoglobin A1c 20% which is consistent with this being a chronic issue.  Pseudohyponatremia is present.  She was resumed on a combined regimen of the Lantus and NovoLog.  She has not needed continuous insulin infusion titration.  Does not meet criteria for diabetic ketoacidosis.    The severity of illness, intensity of service provided, expected LOS and risk for adverse outcome make the care appropriate for further observation; however, doesn't meet criteria for hospital inpatient admission. Dr. Kimball notified of this determination.    With this patient's insurance carrier, there is a requirement for failed observation status and altered mental status.  Recommend a reevaluation tomorrow and consideration for inpatient status if she remains hospitalized.    This document was produced using voice recognition software.      The information on this document is developed by the utilization " review team in order for the business office to ensure compliance.  This only denotes the appropriateness of proper admission status and does not reflect the quality of care rendered.         The definitions of Inpatient Status and Observation Status used in making the determination above are those provided in the CMS Coverage Manual, Chapter 1 and Chapter 6, section 70.4.      Sincerely,  Taina Armijo MD  Utilization Review  Physician Advisor  [facility].

## 2024-07-21 NOTE — SIGNIFICANT EVENT
Significant Event Note    Time of event: 3:49 PM July 21, 2024    Description of event:  Patient's urine culture from admission grew out gram-negative bacilli overall 100,000.    Plan:  Positive urine culture  On admission patient's urinary analysis showed nitrites but only 3 white blood cells.  Culture then grew out over 100,000 gram-negative bacilli.  Patient herself reports having some burning with urination before admission but has not had it while here in the hospital.  Patient is afebrile and has been during hospitalization.  No leukocytosis for patient.  At this point I think this more likely represents a contaminant/colonization and we do not need to treat for UTI at this point.  -May consider treating for UTI if patient develops symptoms: Burning with urination, pain with urination and potentially with urinary frequency    Discussed with: bedside nurse    Omar Carmichael MD

## 2024-07-21 NOTE — ED NOTES
"  [facility] ED Handoff Report     ED Chief Complaint: Shortness of Breath     ED Diagnosis:  (E11.65,  Z79.4) Type 2 diabetes mellitus with hyperglycemia, with long-term current use of insulin (H)  Comment:   Plan:      (R79.89) Pseudohyponatremia  Comment:   Plan:      (R53.83) Other fatigue  Comment:   Plan:      (Z91.148) Noncompliance with medication regimen  Comment:   Plan:         PMH:         Past Medical History:   Diagnosis Date    Chronic kidney disease, stage 2 (mild) 3/22/2023    Diabetes (H)      Hyperlipidemia 10/13/2016    Hypertension           Code Status:  No Order      Falls Risk: No Band: Not applicable     Current Living Situation/Residence: lives with their daughter      Elimination Status: Continent: Yes      Activity Level: SBA     Patients Preferred Language:  Other: HMONG      Needed: Yes- Daughter at bedside     Vital Signs:  /56   Pulse 64   Temp 98.7  F (37.1  C) (Oral)   Resp 16   Ht 1.499 m (4' 11\")   Wt 55.3 kg (122 lb)   SpO2 92%   BMI 24.64 kg/m        Cardiac Rhythm: Afib     Pain Score: 0/10     Is the Patient Confused:  No- medical literacy Is low      Last Food or Drink: 07/20/24 at before arrival to the ER     Focused Assessment:  Patient has been non compliment with insulin and has not been hungry and has had a 10lb weight loss. Daughter ( who she doen't live with ) is at bedside. No other complaints      Tests Performed: Done: Labs and Imaging     Treatments Provided:  Fluids 2500ml, insulin, oral tablet     Family Dynamics/Concerns: No     Family Updated On Visitor Policy: Yes     Plan of Care Communicated to Family: Yes     Who Was Updated about Plan of Care: daughter at beside      Belongings Checklist Done and Signed by Patient: No     Belongings Sent with Patient: clothes     Medications sent with patient: insulin glargine and insulin aspart     Covid: asymptomatic , not tested     Additional Information: Pt ate applesauce for supper. "      RN: Enoch Albrecht RN   7/20/2024 9:17 PM

## 2024-07-22 ENCOUNTER — APPOINTMENT (OUTPATIENT)
Dept: INTERPRETER SERVICES | Facility: CLINIC | Age: 79
End: 2024-07-22
Payer: COMMERCIAL

## 2024-07-22 VITALS
SYSTOLIC BLOOD PRESSURE: 148 MMHG | HEIGHT: 59 IN | TEMPERATURE: 98.7 F | HEART RATE: 75 BPM | BODY MASS INDEX: 24.6 KG/M2 | RESPIRATION RATE: 18 BRPM | WEIGHT: 122 LBS | OXYGEN SATURATION: 96 % | DIASTOLIC BLOOD PRESSURE: 60 MMHG

## 2024-07-22 LAB
ANION GAP SERPL CALCULATED.3IONS-SCNC: 7 MMOL/L (ref 7–15)
BACTERIA UR CULT: ABNORMAL
BUN SERPL-MCNC: 9.3 MG/DL (ref 8–23)
CALCIUM SERPL-MCNC: 8.2 MG/DL (ref 8.8–10.4)
CHLORIDE SERPL-SCNC: 107 MMOL/L (ref 98–107)
CREAT SERPL-MCNC: 0.52 MG/DL (ref 0.51–0.95)
EGFRCR SERPLBLD CKD-EPI 2021: >90 ML/MIN/1.73M2
ERYTHROCYTE [DISTWIDTH] IN BLOOD BY AUTOMATED COUNT: 14.3 % (ref 10–15)
GLUCOSE BLDC GLUCOMTR-MCNC: 126 MG/DL (ref 70–99)
GLUCOSE BLDC GLUCOMTR-MCNC: 170 MG/DL (ref 70–99)
GLUCOSE BLDC GLUCOMTR-MCNC: 191 MG/DL (ref 70–99)
GLUCOSE SERPL-MCNC: 159 MG/DL (ref 70–99)
HCO3 SERPL-SCNC: 25 MMOL/L (ref 22–29)
HCT VFR BLD AUTO: 33.5 % (ref 35–47)
HGB BLD-MCNC: 10.3 G/DL (ref 11.7–15.7)
MCH RBC QN AUTO: 20.9 PG (ref 26.5–33)
MCHC RBC AUTO-ENTMCNC: 30.7 G/DL (ref 31.5–36.5)
MCV RBC AUTO: 68 FL (ref 78–100)
PLATELET # BLD AUTO: 188 10E3/UL (ref 150–450)
POTASSIUM SERPL-SCNC: 3.5 MMOL/L (ref 3.4–5.3)
RBC # BLD AUTO: 4.94 10E6/UL (ref 3.8–5.2)
SODIUM SERPL-SCNC: 139 MMOL/L (ref 135–145)
WBC # BLD AUTO: 5.9 10E3/UL (ref 4–11)

## 2024-07-22 PROCEDURE — 82962 GLUCOSE BLOOD TEST: CPT

## 2024-07-22 PROCEDURE — 85027 COMPLETE CBC AUTOMATED: CPT

## 2024-07-22 PROCEDURE — G0378 HOSPITAL OBSERVATION PER HR: HCPCS

## 2024-07-22 PROCEDURE — 80048 BASIC METABOLIC PNL TOTAL CA: CPT

## 2024-07-22 PROCEDURE — 36415 COLL VENOUS BLD VENIPUNCTURE: CPT

## 2024-07-22 PROCEDURE — 250N000013 HC RX MED GY IP 250 OP 250 PS 637

## 2024-07-22 PROCEDURE — 99238 HOSP IP/OBS DSCHRG MGMT 30/<: CPT | Mod: GC

## 2024-07-22 PROCEDURE — 258N000003 HC RX IP 258 OP 636

## 2024-07-22 RX ADMIN — METOPROLOL SUCCINATE 25 MG: 25 TABLET, EXTENDED RELEASE ORAL at 09:11

## 2024-07-22 RX ADMIN — INSULIN ASPART 1 UNITS: 100 INJECTION, SOLUTION INTRAVENOUS; SUBCUTANEOUS at 09:08

## 2024-07-22 RX ADMIN — INSULIN ASPART 2 UNITS: 100 INJECTION, SOLUTION INTRAVENOUS; SUBCUTANEOUS at 12:07

## 2024-07-22 RX ADMIN — GABAPENTIN 300 MG: 300 CAPSULE ORAL at 09:11

## 2024-07-22 RX ADMIN — SODIUM CHLORIDE, POTASSIUM CHLORIDE, SODIUM LACTATE AND CALCIUM CHLORIDE: 600; 310; 30; 20 INJECTION, SOLUTION INTRAVENOUS at 05:40

## 2024-07-22 RX ADMIN — ISOSORBIDE MONONITRATE 15 MG: 30 TABLET, EXTENDED RELEASE ORAL at 09:10

## 2024-07-22 RX ADMIN — ASPIRIN 81 MG: 81 TABLET, COATED ORAL at 09:11

## 2024-07-22 ASSESSMENT — ACTIVITIES OF DAILY LIVING (ADL)
ADLS_ACUITY_SCORE: 44
ADLS_ACUITY_SCORE: 40
ADLS_ACUITY_SCORE: 44
ADLS_ACUITY_SCORE: 44
ADLS_ACUITY_SCORE: 40
ADLS_ACUITY_SCORE: 44
ADLS_ACUITY_SCORE: 40
ADLS_ACUITY_SCORE: 44
ADLS_ACUITY_SCORE: 44
ADLS_ACUITY_SCORE: 40

## 2024-07-22 NOTE — PROGRESS NOTES
PRIMARY DIAGNOSIS: GENERALIZED WEAKNESS    OUTPATIENT/OBSERVATION GOALS TO BE MET BEFORE DISCHARGE  1. Orthostatic performed: N/A    2. Tolerating PO medications: Yes    3. Return to near baseline physical activity: Yes    4. Cleared for discharge by consultants (if involved): No    Discharge Planner Nurse   Safe discharge environment identified: Yes  Barriers to discharge: Yes       Entered by: Erik Gregorio RN 07/22/2024 6:49 AM     Please review provider order for any additional goals.   Nurse to notify provider when observation goals have been met and patient is ready for discharge.

## 2024-07-22 NOTE — PROGRESS NOTES
Discussed discharge paperwork with pts English speaking family member present at bedside. Family member voiced understanding, specifically to importance of MD follow up & med admin. Personal belongings sent with pt. Pt discharged via w/c accompanied by aide to meet family member at entrance of hospital for transport.

## 2024-07-22 NOTE — PROGRESS NOTES
PRIMARY DIAGNOSIS: GENERALIZED WEAKNESS    OUTPATIENT/OBSERVATION GOALS TO BE MET BEFORE DISCHARGE  1. Orthostatic performed: N/A    2. Tolerating PO medications: Yes    3. Return to near baseline physical activity: Yes    4. Cleared for discharge by consultants (if involved): No    Discharge Planner Nurse   Safe discharge environment identified: Yes  Barriers to discharge: Yes       Entered by: Erik Gregorio RN 07/22/2024 2:01 AM     Please review provider order for any additional goals.   Nurse to notify provider when observation goals have been met and patient is ready for discharge.

## 2024-07-22 NOTE — PROGRESS NOTES
PRIMARY DIAGNOSIS: ACUTE PAIN  OUTPATIENT/OBSERVATION GOALS TO BE MET BEFORE DISCHARGE:  1. Pain Status: Pain free.    2. Return to near baseline physical activity: Yes    3. Cleared for discharge by consultants (if involved): N/A    Discharge Planner Nurse   Safe discharge environment identified: Yes  Barriers to discharge: No       Entered by: Kimi Barlow RN 07/22/2024 12:20 PM     Please review provider order for any additional goals.   Nurse to notify provider when observation goals have been met and patient is ready for discharge.    Denies pain. SBA with transfers. LR infusing. See flowsheet for blood glucose results. Per pts daughter, per MD's potential for discharge later this afternoon.

## 2024-07-22 NOTE — PLAN OF CARE
Problem: Adult Inpatient Plan of Care  Goal: Plan of Care Review  Description: The Plan of Care Review/Shift note should be completed every shift.  The Outcome Evaluation is a brief statement about your assessment that the patient is improving, declining, or no change.  This information will be displayed automatically on your shift  note.  Outcome: Progressing     Problem: Risk for Delirium  Goal: Improved Sleep  Outcome: Progressing     Problem: Comorbidity Management  Goal: Blood Glucose Levels Within Targeted Range  Outcome: Progressing  Intervention: Monitor and Manage Glycemia  Recent Flowsheet Documentation  Taken 7/22/2024 0000 by Erik Gregorio, RN  Medication Review/Management: medications reviewed  Taken 7/21/2024 1932 by Erik Gregorio, RN  Medication Review/Management: medications reviewed    Goal Outcome Evaluation:    Patient AOX4. Able to make needs known. VSS. Denies pain, SOB, and nausea. Denies chest pain currently; additionally, no signs or symptoms of UTI reported or noted. HS BG - 215; BG @ 0202. Daughter bedside overnight; sleeping between cares.

## 2024-07-22 NOTE — DISCHARGE SUMMARY
[facility]  Discharge Summary - Medicine & Pediatrics       Date of Admission: 7/20/2024  Date of Discharge:  7/22/2024  2:55 PM  Discharging Provider: Dr. Renita Payan  Discharge Service: Hospitalist Service    Discharge Diagnoses   Hyperglycemia  Diabetes Mellitus Type II  Chest pain, intermittent  Coronary artery disease  Asymptomatic Bacteriuria  CKD stage 2  GERD  Mild protein calorie malnutrition    Clinically Significant Risk Factors     # DMII: A1C = >20.1 % (Ref range: <5.7 %) within past 6 months       Follow-ups Needed After Discharge   Follow-up Appointments      Follow up with primary care provider at [facility], within 7 days to   evaluate medication change and for hospital follow-up.     Unresulted Labs Ordered in the Past 30 Days of this Admission       No orders found from 6/20/2024 to 7/21/2024.        All have resulted.     Discharge Disposition   Discharged to home  Condition at discharge: Stable    Hospital Course   Jenise Paz was admitted on 7/20/2024 for several weeks of worsening fatigue, intermittent chest pain, and exertional SOB. The following problems were addressed during her hospitalization:    Hyperglycemia   Diabetes Mellitus Type II  Lives with son and daughter in law who help with medications but who were gone on vacation. Family present at bedside was unable to verify how much insulin she had been taking. Some reports of only 10 units lantus every other day. Presented with BG of >1000 in the ED. A1c >20%. Non-ketotic and no anion gap. Urine positive for glucose. VBG WNL. Received NS bolus and maintenance fluids. Sodium corrected to 137 with account for hyperglycemia. Continued metformin and started on Lantus 10 units at bedtime and gave sliding scale mealtime insulin with use of 6-12 units of aspart with meals. Had discussion about diabetic management with patient who preferred orals and less injections. Stressed importance of better  diabetic management and PCP follow up.   - continue metformin 2000 mg daily   - Lantus 10 units at bedtime  - Trulicity starting dose sent to pharmacy  - follow up with PCP within 1 week     Chest pain, intermittent  Coronary Artery Disease   Presented with complaint of intermittent, exertional dyspnea and chest pain. Known history of CAD with angiogram in April 2016 demonstrating multivessel CAD. S/p CABG in 2018. Most recent Echo in 2018 showed abnormal LV septal movement, EF of 61%, mild mitral regurgitation, and keloid from sternotomy. In ED troponin was not elevated and EKG was unchanged. Patient's intermittent chest pain may be stable angina, GERD, or sternotomy keloid pain (which had been previously noted). Low suspicions for ACS given stable troponin and EKG. Repeat ECHO showed EF >65%, moderate concentric LVH, no valvular abnormalities. Continued all other PTA meds including ASA, statin, Imdur, and metoprolol.      Asymptomatic Bacteruira  UA with +Nitrites, >100,000 gram(-) bacilli. Afebrile without leukocytosis and asymptomatic so deferred UTI treatment.       Mild protein calorie malnutrition   Family reports decreased appetite and some weight loss over the last year. Likely related to severe hyperglycemia and uncontrolled diabetes.     Consultations This Hospital Stay   PHYSICAL THERAPY ADULT IP CONSULT  OCCUPATIONAL THERAPY ADULT IP CONSULT  CARE MANAGEMENT / SOCIAL WORK IP CONSULT    Code Status   Prior     The patient was discussed with Dr. Maravilla.     Judith Rodríguez MD  [place] Resident Service [facility] [place] [address]  Phone: [phone]  Fax: [phone]  ______________________________________________________________________    Physical Exam   Vital Signs: Temp: 98.7  F (37.1  C) Temp src: Oral BP: (!) 148/60 Pulse: 75   Resp: 18 SpO2: 96 % O2 Device: None (Room air)    Weight: 122 lbs 0 oz    GEN: Pleasant female in no  acute distress.   HEENT: Normocephalic, atraumatic. Extraoccular eye movements intact. Anicteric sclera. Moist mucous membranes.   PULM: Non-labored breathing. Clear to ausculation bilaterally. No wheezes or crackles.   CV: Regular rate and rhythm. Normal S1, S2. No rubs, murmurs, or gallops.    ABDOMEN: Normoactive bowel sounds. Non-tender to palpation. Non-distended.    EXTREMITES:  No edema.    SKIN: No rash on limited skin exam  NEURO:  Awake. Oriented to person, place, time and situation. Cranial nerves 2-12 grossly intact. Moving all extremities.      Primary Care Physician   Beth De Leon    Discharge Orders      Reason for your hospital stay    High blood sugars.     Follow-up and recommended labs and tests     Follow up with primary care provider at [facility], within 7 days to evaluate medication change and for hospital follow-up.     Activity    Your activity upon discharge: activity as tolerated with assist if needed     Diet    Follow this diet upon discharge: Orders Placed This Encounter      Combination Diet Regular Diet Adult; Moderate Consistent Carb (60 g CHO per Meal) Diet     Significant Results and Procedures   Most Recent 3 CBC's:  Recent Labs   Lab Test 07/22/24  0607 07/21/24  0637 07/20/24  1817   WBC 5.9 7.4 5.7   HGB 10.3* 10.2* 10.3*   MCV 68* 67* 67*    189 160     Most Recent 3 BMP's:  Recent Labs   Lab Test 07/22/24  1154 07/22/24  0753 07/22/24  0607 07/21/24  0834 07/21/24  0637 07/21/24  0359 07/21/24  0222   NA  --   --  139  --  140  --  138   POTASSIUM  --   --  3.5  --  3.5  --  3.5   CHLORIDE  --   --  107  --  106  --  103   CO2  --   --  25  --  26  --  26   BUN  --   --  9.3  --  13.1  --  14.6   CR  --   --  0.52  --  0.54  --  0.53   ANIONGAP  --   --  7  --  8  --  9   SAHIL  --   --  8.2*  --  8.4*  --  8.4*   * 170* 159*   < > 94   < > 123*    < > = values in this interval not displayed.     7-Day Micro Results       Collected Updated Procedure Result  Status      07/20/2024 1811 07/22/2024 0501 Urine Culture [[id number]]    (Abnormal)   Urine, Clean Catch    Final result Component Value   Culture >100,000 CFU/mL Escherichia coli        Susceptibility        Escherichia coli      MADISON      Ampicillin >=32 ug/mL Resistant      Ampicillin/ Sulbactam >=32 ug/mL Resistant      Cefazolin 16 ug/mL Susceptible  [1]       Cefepime <=1 ug/mL Susceptible      Cefoxitin <=4 ug/mL Susceptible      Ceftazidime <=1 ug/mL Susceptible      Ceftriaxone <=1 ug/mL Susceptible      Ciprofloxacin >=4 ug/mL Resistant      Gentamicin <=1 ug/mL Susceptible      Levofloxacin >=8 ug/mL Resistant      Nitrofurantoin <=16 ug/mL Susceptible      Piperacillin/Tazobactam >=128 ug/mL Resistant      Tobramycin <=1 ug/mL Susceptible      Trimethoprim/Sulfamethoxazole <=1/19 ug/mL Susceptible                   [1]  Cefazolin MADISON breakpoints are for the treatment of uncomplicated urinary tract infections. For the treatment of systemic infections, please contact the laboratory for additional testing.                          Most Recent Hemoglobin A1c:  Recent Labs   Lab Test 07/20/24  1608   A1C >20.1*     Most Recent 6 glucoses:  Recent Labs   Lab Test 07/22/24  1154 07/22/24  0753 07/22/24  0607 07/22/24  0202 07/21/24  2039 07/21/24  1710   * 170* 159* 126* 215* 194*   ,   Results for orders placed or performed during the hospital encounter of 07/20/24   Chest XR,  PA & LAT    Narrative    EXAM: XR CHEST 2 VIEWS  LOCATION: [facility]  DATE: 7/20/2024    INDICATION: Chest pain.  COMPARISON: 4/25/2019.      Impression    IMPRESSION: Sternotomy with mediastinal clips. Heart size and pulmonary vascularity within normal limits. Minimal hazy infiltrate or atelectasis right lung base. Aortic calcification. Thoracolumbar curve with hypertrophic changes.   Echocardiogram Complete     Value    LVEF  > 65%    Narrative     [id number]  [id number]  [id number]  [id number]^JEANNA^MICK^[place] [address]     Name: CLIF HICKS  MRN: [id number]  : 1945  Study Date: 2024 02:49 PM  Age: 79 yrs  Gender: Female  Patient Location: [facility]  Reason For Study: Chest Discomfort  Ordering Physician: MICK MEEKS  Performed By: QIAN     BSA: 1.5 m2  Height: 59 in  Weight: 122 lb  HR: 69  BP: 118/59 mmHg  ______________________________________________________________________________  Procedure  Complete Portable Echo Adult.  ______________________________________________________________________________  Interpretation Summary     The left ventricular cavity is small.  Left ventricular function is normal.The ejection fraction is > 65%.  There is moderate concentric left ventricular hypertrophy.  Normal right ventricle size and systolic function.  No hemodynamically significant valvular abnormalities on 2D or color flow  imaging.  ______________________________________________________________________________  Left Ventricle  Left ventricular function is normal.The ejection fraction is > 65%. The left  ventricular cavity is small. There is moderate concentric left ventricular  hypertrophy. Left ventricular diastolic function is abnormal. Diastolic  Doppler findings (E/E' ratio and/or other parameters) suggest left ventricular  filling pressures are increased. No regional wall motion abnormalities noted.     Right Ventricle  Normal right ventricle size and systolic function. TAPSE is normal, which is  consistent with normal right ventricular systolic function.     Atria  The left atrium is severely dilated. The right atrium is mildly dilated. There  is no color Doppler evidence of an atrial shunt.     Mitral Valve  Mitral valve leaflets appear normal. There is no evidence of mitral stenosis  or clinically significant mitral regurgitation. There is mild (1+) mitral  regurgitation.     Tricuspid  Valve  Tricuspid valve leaflets appear normal. There is trace to mild tricuspid  regurgitation. Right ventricular systolic pressure could not be approximated  due to inadequate tricuspid regurgitation.     Aortic Valve  Aortic valve leaflets appear normal. There is no evidence of aortic stenosis  or clinically significant aortic regurgitation.     Pulmonic Valve  The pulmonic valve is normal in structure and function.     Vessels  The aorta root is normal. Normal size ascending aorta. IVC diameter <2.1 cm  collapsing >50% with sniff suggests a normal RA pressure of 3 mmHg.     Pericardium  There is no pericardial effusion.     ______________________________________________________________________________  MMode/2D Measurements & Calculations  IVSd: 1.4 cm  LVIDd: 3.5 cm  LVIDs: 2.1 cm  LVPWd: 1.8 cm  FS: 39.1 %     LV mass(C)d: 213.6 grams  LV mass(C)dI: 142.9 grams/m2  Ao root diam: 3.4 cm  asc Aorta Diam: 3.6 cm  LVOT diam: 2.1 cm  LVOT area: 3.5 cm2  Ao root diam index Ht(cm/m): 2.3  Ao root diam index BSA (cm/m2): 2.3  Asc Ao diam index BSA (cm/m2): 2.4  Asc Ao diam index Ht(cm/m): 2.4  EF Biplane: 57.1 %  LA Volume (BP): 84.0 ml     LA Volume Index (BP): 56.4 ml/m2  LA Volume Indexed (AL/bp): 60.0 ml/m2  RV Base: 4.6 cm  RWT: 1.0  TAPSE: 1.6 cm     Doppler Measurements & Calculations  MV E max stephane: 83.4 cm/sec  MV A max stephane: 129.0 cm/sec  MV E/A: 0.65  MV max P.3 mmHg  MV mean P.0 mmHg  MV V2 VTI: 29.8 cm  MVA(VTI): 2.4 cm2     MV dec time: 0.30 sec  Ao V2 max: 76.8 cm/sec  Ao max P.0 mmHg  Ao V2 mean: 86.9 cm/sec  Ao mean PG: 3.0 mmHg  Ao V2 VTI: 26.6 cm  RD(I,D): 2.6 cm2  RD(V,D): 4.8 cm2  LV V1 max P.6 mmHg  LV V1 max: 107.0 cm/sec  LV V1 VTI: 20.3 cm  SV(LVOT): 70.3 ml  SI(LVOT): 47.0 ml/m2  PA acc time: 0.10 sec  AV Stephane Ratio (DI): 1.4  RD Index (cm2/m2): 1.8  E/E': 13.2  E/E' av.2  Lateral E/e': 13.2  Medial E/e': 13.2  Peak E' Stephane: 6.3 cm/sec  RV S Stephane: 9.6 cm/sec      ______________________________________________________________________________  Report approved by: Elvia Cain 07/21/2024 04:43 PM             Discharge Medications   Discharge Medication List as of 7/22/2024  2:40 PM        START taking these medications    Details   dulaglutide (TRULICITY) 0.75 MG/0.5ML pen Inject 0.75 mg subcutaneously every 7 days for 28 days, Disp-2 mL, R-0, E-Prescribe           CONTINUE these medications which have CHANGED    Details   insulin glargine (LANTUS PEN) 100 UNIT/ML pen Inject 10 Units subcutaneously at bedtime, Disp-15 mL, R-0, E-PrescribeIf Lantus is not covered by insurance, may substitute Basaglar or Semglee or other insulin glargine product per insurance preference at same dose and frequency.             CONTINUE these medications which have NOT CHANGED    Details   ACETAMINOPHEN EXTRA STRENGTH 500 MG tablet TAKE 2 TABLETS BY MOUTH THREE TIMES DAILY AS NEEDED, Disp-200 tablet, R-12, E-Prescribe      alendronate (FOSAMAX) 70 MG tablet Take 1 tablet (70 mg) by mouth every 7 days, Disp-12 tablet, R-4, E-Prescribe      aspirin (ASA) 81 MG EC tablet Take 1 tablet (81 mg) by mouth daily, Disp-90 tablet, R-3, E-Prescribe      atorvastatin (LIPITOR) 80 MG tablet TAKE 1 TABLET(80 MG) BY MOUTH DAILY, Disp-90 tablet, R-4, E-Prescribe      famotidine (PEPCID) 20 MG tablet TAKE 1 TABLET BY MOUTH 2 TIMES DAILY AS NEEDED, Disp-180 tablet, R-1, E-Prescribe**Patient requests 90 days supply**      gabapentin (NEURONTIN) 300 MG capsule TAKE 1 CAPSULE(300 MG) BY MOUTH TWICE DAILY, Disp-60 capsule, R-11, E-Prescribe      isosorbide mononitrate (IMDUR) 30 MG 24 hr tablet TAKE 1/2 TABLET(15 MG) BY MOUTH EVERY DAY, Disp-90 tablet, R-4, E-Prescribe      metFORMIN (GLUCOPHAGE XR) 500 MG 24 hr tablet TAKE 4 TABLETS BY MOUTH EVERY DAY WITH FOOD, Disp-360 tablet, R-3, E-Prescribe      metoprolol succinate ER (TOPROL XL) 25 MG 24 hr tablet TAKE 1 TABLET BY MOUTH EVERY DAY, Disp-90 tablet,  R-4, E-Prescribe      vitamin D3 (CHOLECALCIFEROL) 50 mcg (2000 units) tablet Take 1 tablet (50 mcg) by mouth daily, Disp-90 tablet, R-3, E-Prescribe      CONTOUR NEXT TEST test strip TEST BLOOD SUGAR TWICE DAILY OR AS DIRECTED, Disp-150 strip, R-4, E-Prescribe      Microlet Lancets MISC USE TO TEST TWICE DAILY OR AS DIRECTED., Disp-100 each,R-3, E-Prescribe           Allergies   Allergies   Allergen Reactions    Nka [No Known Allergies]

## 2024-07-22 NOTE — PROGRESS NOTES
PRIMARY DIAGNOSIS: GENERALIZED WEAKNESS    OUTPATIENT/OBSERVATION GOALS TO BE MET BEFORE DISCHARGE  1. Orthostatic performed: N/A    2. Tolerating PO medications: Yes    3. Return to near baseline physical activity: Yes    4. Cleared for discharge by consultants (if involved): No    Discharge Planner Nurse   Safe discharge environment identified: Yes  Barriers to discharge: Yes       Entered by: Erik Gregorio RN 07/22/2024 2:04 AM     Please review provider order for any additional goals.   Nurse to notify provider when observation goals have been met and patient is ready for discharge.

## 2024-07-23 DIAGNOSIS — I25.110 CORONARY ARTERY DISEASE INVOLVING NATIVE CORONARY ARTERY OF NATIVE HEART WITH UNSTABLE ANGINA PECTORIS (H): ICD-10-CM

## 2024-07-23 LAB
ATRIAL RATE - MUSE: 77 BPM
DIASTOLIC BLOOD PRESSURE - MUSE: NORMAL MMHG
INTERPRETATION ECG - MUSE: NORMAL
P AXIS - MUSE: 72 DEGREES
PR INTERVAL - MUSE: 192 MS
QRS DURATION - MUSE: 102 MS
QT - MUSE: 422 MS
QTC - MUSE: 477 MS
R AXIS - MUSE: 56 DEGREES
SYSTOLIC BLOOD PRESSURE - MUSE: NORMAL MMHG
T AXIS - MUSE: 26 DEGREES
VENTRICULAR RATE- MUSE: 77 BPM

## 2024-07-23 RX ORDER — ASPIRIN 81 MG/1
81 TABLET, COATED ORAL DAILY
Qty: 90 TABLET | Refills: 3 | Status: SHIPPED | OUTPATIENT
Start: 2024-07-23 | End: 2024-08-02

## 2024-07-23 NOTE — TELEPHONE ENCOUNTER
Name from pharmacy: ASPIRIN 81MG EC LOW DOSE TABLETS          Will file in chart as: ASPIRIN LOW DOSE 81 MG EC tablet    Sig: TAKE 1 TABLET(81 MG) BY MOUTH DAILY    Disp: 90 tablet    Refills: 3 (Pharmacy requested: Not specified)    Start: 7/23/2024    Class: E-Prescribe    Non-formulary For: Coronary artery disease involving native coronary artery of native heart with unstable angina pectoris (H)    Last ordered: 1 year ago (4/3/2023) by James Garrison MD    Last refill: 4/3/2023    Rx #: [id number]    Analgesics (Non-Narcotic Tylenol and ASA Only) [id number]/2024 04:26 AM   Protocol Details Recent (12 mo) or future (90 days) visit within the authorizing provider's specialty        RON Cisse, BSN

## 2024-07-26 ENCOUNTER — APPOINTMENT (OUTPATIENT)
Dept: INTERPRETER SERVICES | Facility: CLINIC | Age: 79
End: 2024-07-26
Payer: COMMERCIAL

## 2024-07-26 ENCOUNTER — TELEPHONE (OUTPATIENT)
Dept: FAMILY MEDICINE | Facility: CLINIC | Age: 79
End: 2024-07-26
Payer: COMMERCIAL

## 2024-07-26 NOTE — TELEPHONE ENCOUNTER
Care Coordination: 7/26/2024    Support Calls to: patient and son Tamera Herrmann, [phone] & [phone] offering an in clinic primary care follow up appointment after a recent ER visit. Using language services, I attempted calls to the patient and son. I was forced to leave voice messages requesting a call back to the clinic for scheduling if interested.    The patient called back as I was typing this note and scheduled:    8/2/24 @ 2:10 pm Pharm D  8/9/2024 @ 3:10 pm PCP      Date of discharge: 7/22/2024  Facility of discharge: [facility]  Patient concerns about condition: No concerns at this time.  Patient concerns about medications: No concerns at this time.  Full med reconciliation will be completed at clinic visit.  Patient concerns about transitioning: No concerns at this time.  Clinic office visit appointment date: 8/2/2024 @ 2:10 pm Pharm D, 8/9/24 @ 3:10 pm PCP  Patient reminded to bring all medications (prescription and over-the-counter) to clinic appointment: Yes    Using the date of discharge as day 1, the 30th day post discharge is 8/22/2024.            Maksim Edwards Sr.   Care Coordination  [address] [address]  [email or web address]   Office: [phone] Direct: [phone]  [facility]

## 2024-07-26 NOTE — TELEPHONE ENCOUNTER
Date of discharge: 7/22/2024  Facility of discharge: [facility]  Patient concerns about condition: No concerns at this time.  Patient concerns about medications: No concerns at this time.  Full med reconciliation will be completed at clinic visit.  Patient concerns about transitioning: No concerns at this time.  Clinic office visit appointment date: 8/2/2024 @ 2:10 pm Pharm D & 8/9/2024 @ 3:10 pm  Patient reminded to bring all medications (prescription and over-the-counter) to clinic appointment: Yes    Using the date of discharge as day 1, the 30th day post discharge is 8/22/2024.

## 2024-08-02 ENCOUNTER — OFFICE VISIT (OUTPATIENT)
Dept: PHARMACY | Facility: CLINIC | Age: 79
End: 2024-08-02
Payer: COMMERCIAL

## 2024-08-02 VITALS
HEART RATE: 81 BPM | RESPIRATION RATE: 20 BRPM | WEIGHT: 127 LBS | BODY MASS INDEX: 25.65 KG/M2 | DIASTOLIC BLOOD PRESSURE: 71 MMHG | TEMPERATURE: 97.9 F | OXYGEN SATURATION: 97 % | SYSTOLIC BLOOD PRESSURE: 110 MMHG

## 2024-08-02 DIAGNOSIS — I10 PRIMARY HYPERTENSION: ICD-10-CM

## 2024-08-02 DIAGNOSIS — M85.89 OSTEOPENIA OF MULTIPLE SITES: ICD-10-CM

## 2024-08-02 DIAGNOSIS — S32.010D COMPRESSION FRACTURE OF L1 VERTEBRA WITH ROUTINE HEALING: ICD-10-CM

## 2024-08-02 DIAGNOSIS — E11.9 TYPE 2 DIABETES MELLITUS WITHOUT COMPLICATION, WITHOUT LONG-TERM CURRENT USE OF INSULIN (H): ICD-10-CM

## 2024-08-02 DIAGNOSIS — E78.00 HYPERCHOLESTEREMIA: ICD-10-CM

## 2024-08-02 DIAGNOSIS — E55.9 VITAMIN D DEFICIENCY: ICD-10-CM

## 2024-08-02 DIAGNOSIS — I25.110 CORONARY ARTERY DISEASE INVOLVING NATIVE CORONARY ARTERY OF NATIVE HEART WITH UNSTABLE ANGINA PECTORIS (H): ICD-10-CM

## 2024-08-02 DIAGNOSIS — M54.42 CHRONIC LEFT-SIDED LOW BACK PAIN WITH LEFT-SIDED SCIATICA: ICD-10-CM

## 2024-08-02 DIAGNOSIS — K59.03 DRUG-INDUCED CONSTIPATION: Primary | ICD-10-CM

## 2024-08-02 DIAGNOSIS — G89.29 CHRONIC LEFT-SIDED LOW BACK PAIN WITH LEFT-SIDED SCIATICA: ICD-10-CM

## 2024-08-02 PROCEDURE — 99607 MTMS BY PHARM ADDL 15 MIN: CPT | Performed by: PHARMACIST

## 2024-08-02 PROCEDURE — 99605 MTMS BY PHARM NP 15 MIN: CPT | Performed by: PHARMACIST

## 2024-08-02 RX ORDER — FAMOTIDINE 20 MG/1
20 TABLET, FILM COATED ORAL 2 TIMES DAILY PRN
Qty: 180 TABLET | Refills: 1 | Status: SHIPPED | OUTPATIENT
Start: 2024-08-02

## 2024-08-02 RX ORDER — CHOLECALCIFEROL (VITAMIN D3) 50 MCG
1 TABLET ORAL DAILY
Qty: 90 TABLET | Refills: 3 | Status: SHIPPED | OUTPATIENT
Start: 2024-08-02

## 2024-08-02 RX ORDER — ASPIRIN 81 MG/1
81 TABLET ORAL DAILY
Qty: 90 TABLET | Refills: 1 | Status: SHIPPED | OUTPATIENT
Start: 2024-08-02

## 2024-08-02 RX ORDER — PSEUDOEPHED/ACETAMINOPH/DIPHEN 30MG-500MG
1000 TABLET ORAL EVERY 6 HOURS PRN
Qty: 100 TABLET | Refills: 11 | Status: SHIPPED | OUTPATIENT
Start: 2024-08-02

## 2024-08-02 RX ORDER — ISOSORBIDE MONONITRATE 30 MG/1
15 TABLET, EXTENDED RELEASE ORAL DAILY
Qty: 45 TABLET | Refills: 1 | Status: SHIPPED | OUTPATIENT
Start: 2024-08-02

## 2024-08-02 RX ORDER — ATORVASTATIN CALCIUM 80 MG/1
80 TABLET, FILM COATED ORAL DAILY
Qty: 90 TABLET | Refills: 1 | Status: SHIPPED | OUTPATIENT
Start: 2024-08-02

## 2024-08-02 RX ORDER — METOPROLOL SUCCINATE 25 MG/1
25 TABLET, EXTENDED RELEASE ORAL DAILY
Qty: 90 TABLET | Refills: 1 | Status: SHIPPED | OUTPATIENT
Start: 2024-08-02

## 2024-08-02 RX ORDER — ALENDRONATE SODIUM 70 MG/1
70 TABLET ORAL
Qty: 12 TABLET | Refills: 1 | Status: SHIPPED | OUTPATIENT
Start: 2024-08-02

## 2024-08-02 RX ORDER — METFORMIN HCL 500 MG
2000 TABLET, EXTENDED RELEASE 24 HR ORAL
Qty: 360 TABLET | Refills: 1 | Status: SHIPPED | OUTPATIENT
Start: 2024-08-02

## 2024-08-02 RX ORDER — AMOXICILLIN 250 MG
1 CAPSULE ORAL DAILY PRN
Qty: 90 TABLET | Refills: 1 | Status: SHIPPED | OUTPATIENT
Start: 2024-08-02

## 2024-08-02 RX ORDER — GABAPENTIN 300 MG/1
300 CAPSULE ORAL 2 TIMES DAILY
Qty: 60 CAPSULE | Refills: 3 | Status: SHIPPED | OUTPATIENT
Start: 2024-08-02

## 2024-08-02 NOTE — PROGRESS NOTES
Medication Therapy Management (MTM) Encounter    ASSESSMENT:                            Medication Adherence/Access: See below for considerations    Type 2 diabetes:   Not meeting HbA1c goal of less than 8%.  We discussed at length the risks of untreated diabetes, and I tried to relate some of the symptoms that she is having right now (polyuria polydipsia, lack of appetite) to her diabetes.  Patient is quite adamant that she does not want any injections today.  I was able to convince her to start Jardiance for her diabetes.    Hypertension/CAD:  Meeting blood pressure goal of less than 130/80.  Continue current medications.    Hyperlipidemia:  Stable    Osteoporosis/history of compression fracture:  Stable    Diabetic neuropathy/low back pain:  Stable    Constipation:  Needs additional therapy    PLAN:                            Start Jardiance 10 mg daily   Start Senna-S daily as needed     Follow-up: 1 week with Dr. PIERCE.  As needed with pharmacist .    SUBJECTIVE/OBJECTIVE:                          Jenise Paz is a 79 year old female seen for a transitions of care visit. She was discharged from [facility] on 7/22 for diabetes/hyperglycemia. Patient was accompanied by daughter in law. Patient seen with [facility] .     Reason for visit: Hospital follow up.    Allergies/ADRs: Reviewed in chart  Past Medical History: Reviewed in chart  Tobacco: She reports that she has never smoked. She has never been exposed to tobacco smoke. She has never used smokeless tobacco.  Social History     Tobacco Use    Smoking status: Never     Passive exposure: Never    Smokeless tobacco: Never    Tobacco comments:     Children smoke outside of the home   Vaping Use    Vaping status: Never Used   Substance Use Topics    Alcohol use: No    Drug use: No     Medication Adherence/Access: Patient lives with her son and daughter-in-law.  Daughter-in-law that she lives with her accompanies her today.  Daughter-in-law states that patient is  quite stubborn and if she does not want to take medication she just will not take them.  Patient thinks that she takes too many medications.    Diabetes   Metformin XR 2000mg daily  Additionally, patient was prescribed a starting dose of Trulicity, and Lantus once daily on discharge, but is not taking them.  She is also very unwilling to take them.  Aspirin 81mg daily  Patient is not experiencing side effects.  Current diabetes symptoms:  Loss of appetite, polyuria, and polydipsia  Blood sugar monitorin time(s) daily; Ranges: (patient reported) 200s to 300s  Diet/Exercise: Not assessed     Eye exam in the last 12 months? No  Foot exam: due    Hypertension with CAD  Aspirin 81mg daily  Imdur 15mg daily  Metoprolol ER 25mg daily   Patient reports no current medication side effects  Patient does not self-monitor blood pressure.    BP Readings from Last 3 Encounters:   24 110/71   24 (!) 148/60   23 (!) 82/53     Pulse Readings from Last 3 Encounters:   24 81   24 75   23 74        Hyperlipidemia   atorvastatin 80mg daily  Patient reports no significant myalgias or other side effects.  The ASCVD Risk score (Xin ESPARZA, et al., 2019) failed to calculate for the following reasons:    The patient has a prior MI or stroke diagnosis     Osteoporosis, hx of compression fracture  Alendronate 70mg once weekly  Cholecalciferol 2000 international unit(s) daily   Last DEXA: ; has a history of compression fracture      Low Back Pain, Diabetic Neuropathy:   Acetaminophen 1000mg three times daily as needed   Gabapentin 300mg twice daily     Constipation:  Patient desires medication for constipation    Today's Vitals: /71   Pulse 81   Temp 97.9  F (36.6  C) (Tympanic)   Resp 20   Wt 127 lb (57.6 kg)   SpO2 97%   BMI 25.65 kg/m    ----------------  Post Discharge Medication Reconciliation Status: discharge medications reconciled and changed, per note/orders.    I spent 30 minutes  with this patient today. All changes were made via collaborative practice agreement with Kayli Maxwell MD. A copy of the visit note was provided to the patient's provider(s).    A summary of these recommendations was given to the patient.    Liyah Tomas, PharmD          Medication Therapy Recommendations  Drug-induced constipation    Rationale: Untreated condition - Needs additional medication therapy - Indication   Recommendation: Start Medication   Status: Accepted - no CPA Needed         Type 2 diabetes mellitus with hyperglycemia, with long-term current use of insulin (H)    Rationale: Synergistic therapy - Needs additional medication therapy - Indication   Recommendation: Start Medication - Jardiance 10 MG Tabs   Status: Accepted per CPA

## 2024-08-02 NOTE — LETTER
"Recommended To-Do List      Prepared on: Aug 2, 2024       You can get the best results from your medications by completing the items on this \"To-Do List.\"      Bring your To-Do List when you go to your doctor. And, share it with your family or caregivers.    My To-Do List:  What we talked about: What I should do:   A new medication that may be of benefit to you    Start taking Senna-S          What we talked about: What I should do:   A new medication that may be of benefit to you    Start taking Jardiance          What we talked about: What I should do:                     "

## 2024-08-02 NOTE — LETTER
August 2, 2024  Jenise Paz  [address] [place] [address]    Dear MARI Garcia [facility]     Thank you for talking with me on Aug 2, 2024 about your health and medications. As a follow-up to our conversation, I have included two documents:      Your Recommended To-Do List has steps you should take to get the best results from your medications.  Your Medication List will help you keep track of your medications and how to take them.    If you want to talk about these documents, please call Liyah Tomas RPH at phone: [phone], Monday-Friday 8-4:30pm.    I look forward to working with you and your doctors to make sure your medications work well for you.    Sincerely,  Liyah Tomas RPH  [facility] Pharmacist, [facility]

## 2024-08-02 NOTE — LETTER
_  Medication List        Prepared on: Aug 2, 2024     Bring your Medication List when you go to the doctor, hospital, or   emergency room. And, share it with your family or caregivers.     Note any changes to how you take your medications.  Cross out medications when you no longer use them.    Medication How I take it Why I use it Prescriber   acetaminophen (TYLENOL) 500 MG tablet Take 2 tablets (1,000 mg) by mouth every 6 hours as needed for mild pain Chronic left-sided low back pain with left-sided sciatica Kayli Maxwell MD   alendronate (FOSAMAX) 70 MG tablet Take 1 tablet (70 mg) by mouth every 7 days Compression fracture of L1 vertebra with routine healing; Osteopenia of multiple sites Kayli Maxwell MD   aspirin (ASPIRIN LOW DOSE) 81 MG EC tablet Take 1 tablet (81 mg) by mouth daily Coronary artery disease involving native coronary artery of native heart with unstable angina pectoris (H) Kayli Maxwell MD   atorvastatin (LIPITOR) 80 MG tablet Take 1 tablet (80 mg) by mouth daily Hypercholesteremia Kayli Maxwell MD   blood glucose (CONTOUR NEXT TEST) test strip Use to test blood sugar 2 times daily or as directed. Type 2 diabetes mellitus without complication, without long-term current use of insulin (H) Kayli Maxwell MD   blood glucose (NO BRAND SPECIFIED) lancets standard Use to test blood sugar 2 times daily or as directed. Type 2 diabetes mellitus without complication, without long-term current use of insulin (H) Kayli Maxwell MD   empagliflozin (JARDIANCE) 10 MG TABS tablet Take 1 tablet (10 mg) by mouth daily Type 2 diabetes mellitus without complication, without long-term current use of insulin (H) Kayli Maxwell MD   famotidine (PEPCID) 20 MG tablet Take 1 tablet (20 mg) by mouth 2 times daily as needed (heartnurn)   Kayli Maxwell MD   gabapentin (NEURONTIN) 300 MG capsule Take 1 capsule (300 mg) by mouth 2 times daily Chronic left-sided low back pain with left-sided  sciatica Kayli Maxwell MD   insulin glargine (LANTUS PEN) 100 UNIT/ML pen Inject 10 Units subcutaneously at bedtime Type 2 diabetes mellitus with hyperglycemia, with long-term current use of insulin (H) Judith Rodríguez MD   isosorbide mononitrate (IMDUR) 30 MG 24 hr tablet Take 0.5 tablets (15 mg) by mouth daily  AVRIL Maxwell MD   metFORMIN (GLUCOPHAGE XR) 500 MG 24 hr tablet Take 4 tablets (2,000 mg) by mouth daily (with dinner) Type 2 diabetes mellitus without complication, without long-term current use of insulin (H) Kayli Maxwell MD   metoprolol succinate ER (TOPROL XL) 25 MG 24 hr tablet Take 1 tablet (25 mg) by mouth daily  AVRIL Maxwell MD   Microlet Lancets MISC USE TO TEST TWICE DAILY OR AS DIRECTED. Type 2 diabetes mellitus without complication, without long-term current use of insulin (H) Afia Cortez MD   senna-docusate (SENOKOT-S/PERICOLACE) 8.6-50 MG tablet Take 1 tablet by mouth daily as needed for constipation Drug-Induced Constipation Kayli Maxwell MD   vitamin D3 (CHOLECALCIFEROL) 50 mcg (2000 units) tablet Take 1 tablet (50 mcg) by mouth daily Vitamin D Deficiency Kayli Maxwell MD         Add new medications, over-the-counter drugs, herbals, vitamins, or  minerals in the blank rows below.    Medication How I take it Why I use it Prescriber                                      Allergies:      - Nka [no Known Allergies]        Side effects I have had:      Not on File        Other Information:              My notes and questions:

## 2024-08-09 ENCOUNTER — OFFICE VISIT (OUTPATIENT)
Dept: FAMILY MEDICINE | Facility: CLINIC | Age: 79
End: 2024-08-09
Payer: COMMERCIAL

## 2024-08-09 VITALS
WEIGHT: 128.4 LBS | BODY MASS INDEX: 25.93 KG/M2 | SYSTOLIC BLOOD PRESSURE: 96 MMHG | RESPIRATION RATE: 16 BRPM | TEMPERATURE: 98.4 F | DIASTOLIC BLOOD PRESSURE: 57 MMHG | OXYGEN SATURATION: 95 % | HEART RATE: 74 BPM

## 2024-08-09 DIAGNOSIS — Z09 HOSPITAL DISCHARGE FOLLOW-UP: Primary | ICD-10-CM

## 2024-08-09 DIAGNOSIS — M79.10 MYALGIA: ICD-10-CM

## 2024-08-09 DIAGNOSIS — Z79.4 TYPE 2 DIABETES MELLITUS WITH DIABETIC POLYNEUROPATHY, WITH LONG-TERM CURRENT USE OF INSULIN (H): ICD-10-CM

## 2024-08-09 DIAGNOSIS — I25.110 CORONARY ARTERY DISEASE INVOLVING NATIVE CORONARY ARTERY OF NATIVE HEART WITH UNSTABLE ANGINA PECTORIS (H): ICD-10-CM

## 2024-08-09 DIAGNOSIS — E11.42 TYPE 2 DIABETES MELLITUS WITH DIABETIC POLYNEUROPATHY, WITH LONG-TERM CURRENT USE OF INSULIN (H): ICD-10-CM

## 2024-08-09 PROCEDURE — 99214 OFFICE O/P EST MOD 30 MIN: CPT | Mod: GC

## 2024-08-09 NOTE — PROGRESS NOTES
Assessment & Plan     Hospital discharge follow-up  Type 2 diabetes mellitus with diabetic polyneuropathy, with long-term current use of insulin (H)  Coronary artery disease involving native coronary artery of native heart with unstable angina pectoris (H)  Poorly controlled DM2 mainly due to medication adherence. I dicussed at length with the patient the seriousness of her condition as well as the importance of taking medications as prescribed but she stated she is not willing to use insulin. She did start Trulicity at discharge which she has been taking. Recommended increasing BG checks to twice daily and close follow up with PCP and MTM.    Chest pain during admission was thought to be unrelated to preexisting CAD, and cardiac workup was reassuring. No chest pain, SOB, pedal edema since discharge.    Muscle Aches, Generalized (Myalgia)  - diclofenac (VOLTAREN) 1 % topical gel  Dispense: 350 g; Refill: 1    MED REC REQUIRED  Post Medication Reconciliation Status:  Unable to reconcile discharge medications - patient did not bring meds, and was not accompanied by family member who helps with med management. She did however have an MTM appointment a week ago at which time med rec was completed.    Return in about 2 weeks (around 8/23/2024) for Follow up with Dr. Maxwell.    Arnold Burden is a 79 year old, presenting for the following health issues:  Hospital F/U        8/9/2024     3:11 PM   Additional Questions   Roomed by demetrius   Accompanied by son on the waiting room         8/9/2024    Information    services provided? Yes   Language Hmong   Type of interpretation provided Face-to-face    name Deysi    Agency [employer]      [facility] Follow-up Visit:    Hospital/Nursing Home/ Rehab Facility: [facility]  Date of Admission: 7/20/24  Date of Discharge: 7/22/24  Reason(s) for Admission: hyperglycemia > 1000  Was the patient in the ICU or  did the patient experience delirium during hospitalization?  No  Do you have any other stressors you would like to discuss with your provider? No    Problems taking medications regularly:  None  Medication changes since discharge: started Trulicity  Problems adhering to non-medication therapy:  None    Summary of hospitalization:  [facility] discharge summary reviewed  Diagnostic Tests/Treatments reviewed.  Follow up needed: none  Other Healthcare Providers Involved in Patient s Care:         MTM  Update since discharge: stable, poorly controlled DM2.  Checks BG once a day, in the morning, typically 180-205    Plan of care communicated with patient       Objective    BP 96/57   Pulse 74   Temp 98.4  F (36.9  C) (Tympanic)   Resp 16   Wt 58.2 kg (128 lb 6.4 oz)   SpO2 95%   BMI 25.93 kg/m    Body mass index is 25.93 kg/m .    Physical Exam   Constitutional: alert, no acute distress, pleasant and cooperative  Cardiovascular: regular rate and rhythm, no murmurs/rubs/gallops, extremities warm and well perfused, no peripheral edema  Respiratory: normal respiratory rate/rhythm/effort, all lung fields CTAB, speaks in complete sentences  Eyes: conjunctivae normal and sclera anicteric  Neuro: Alert and oriented, no focal deficits  Skin: no suspicious lesions or rashes   MSK: no gross deformities noted, range of motion grossly normal   Psychiatric: mentation and affect normal, judgment and insight intact          Signed Electronically by: DEACON GARRISON MD

## 2024-08-09 NOTE — PROGRESS NOTES
Preceptor attestation:  Vital signs reviewed: BP 96/57   Pulse 74   Temp 98.4  F (36.9  C) (Tympanic)   Resp 16   Wt 58.2 kg (128 lb 6.4 oz)   SpO2 95%   BMI 25.93 kg/m      Patient seen, evaluated, and discussed with the resident.  I verified the content of the note, which accurately reflects my assessment of the patient and the plan of care.    Supervising physician: Judith Wood MD  [facility]

## 2024-08-09 NOTE — PATIENT INSTRUCTIONS
Nice to see you today! Here's a recap of what we talked about:    1) Inject the insulin every day as directed    2) Inject the Trulicity once a week as directed    3) Check your blood sugar twice a day, in the morning and at bedtime    If you have any questions or need further assistance, please call the clinic at [phone] or send me a Schrodinger message.    Victor Manuel Wilson MD, MEd  Resident Physician (PGY-2)  [facility]

## 2024-08-20 ENCOUNTER — MYC REFILL (OUTPATIENT)
Dept: FAMILY MEDICINE | Facility: CLINIC | Age: 79
End: 2024-08-20
Payer: COMMERCIAL

## 2024-08-20 DIAGNOSIS — Z79.4 TYPE 2 DIABETES MELLITUS WITH HYPERGLYCEMIA, WITH LONG-TERM CURRENT USE OF INSULIN (H): ICD-10-CM

## 2024-08-20 DIAGNOSIS — E11.65 TYPE 2 DIABETES MELLITUS WITH HYPERGLYCEMIA, WITH LONG-TERM CURRENT USE OF INSULIN (H): ICD-10-CM

## 2024-08-21 NOTE — TELEPHONE ENCOUNTER
insulin glargine (LANTUS PEN) 100 UNIT/ML pen         Sig: Inject 10 Units subcutaneously at bedtime    Disp: 15 mL    Refills: 0    Start: 8/20/2024    Class: E-Prescribe    Non-formulary For: Type 2 diabetes mellitus with hyperglycemia, with long-term current use of insulin (H)    To pharmacy: If Lantus is not covered by insurance, may substitute Basaglar or Semglee or other insulin glargine product per insurance preference at same dose and frequency.      Last ordered: 1 month ago (7/22/2024) by Judith Rodríguez MD    Insulin Protocol [id number]/20/2024 11:29 PM   Protocol Details Recent (6 mo) or future (90 days) visit within the authorizing provider's specialty    Chart Review Required        RON Cisse, BSN

## 2024-08-26 ENCOUNTER — OFFICE VISIT (OUTPATIENT)
Dept: FAMILY MEDICINE | Facility: CLINIC | Age: 79
End: 2024-08-26
Payer: COMMERCIAL

## 2024-08-26 VITALS
SYSTOLIC BLOOD PRESSURE: 92 MMHG | HEIGHT: 59 IN | OXYGEN SATURATION: 98 % | WEIGHT: 128 LBS | RESPIRATION RATE: 18 BRPM | DIASTOLIC BLOOD PRESSURE: 55 MMHG | TEMPERATURE: 98.6 F | BODY MASS INDEX: 25.8 KG/M2 | HEART RATE: 66 BPM

## 2024-08-26 DIAGNOSIS — Z79.4 TYPE 2 DIABETES MELLITUS WITH DIABETIC POLYNEUROPATHY, WITH LONG-TERM CURRENT USE OF INSULIN (H): ICD-10-CM

## 2024-08-26 DIAGNOSIS — E11.65 TYPE 2 DIABETES MELLITUS WITH HYPERGLYCEMIA, WITH LONG-TERM CURRENT USE OF INSULIN (H): ICD-10-CM

## 2024-08-26 DIAGNOSIS — Z79.4 TYPE 2 DIABETES MELLITUS WITH HYPERGLYCEMIA, WITH LONG-TERM CURRENT USE OF INSULIN (H): ICD-10-CM

## 2024-08-26 DIAGNOSIS — Z79.4 TYPE 2 DIABETES MELLITUS WITH DIABETIC POLYNEUROPATHY, WITH LONG-TERM CURRENT USE OF INSULIN (H): Primary | ICD-10-CM

## 2024-08-26 DIAGNOSIS — E11.42 TYPE 2 DIABETES MELLITUS WITH DIABETIC POLYNEUROPATHY, WITH LONG-TERM CURRENT USE OF INSULIN (H): ICD-10-CM

## 2024-08-26 DIAGNOSIS — E11.42 TYPE 2 DIABETES MELLITUS WITH DIABETIC POLYNEUROPATHY, WITH LONG-TERM CURRENT USE OF INSULIN (H): Primary | ICD-10-CM

## 2024-08-26 LAB
CHOLEST SERPL-MCNC: 173 MG/DL
CREAT UR-MCNC: 58.3 MG/DL
FASTING STATUS PATIENT QL REPORTED: NORMAL
HDLC SERPL-MCNC: 52 MG/DL
LDLC SERPL CALC-MCNC: 96 MG/DL
MICROALBUMIN UR-MCNC: 16.1 MG/L
MICROALBUMIN/CREAT UR: 27.62 MG/G CR (ref 0–25)
NONHDLC SERPL-MCNC: 121 MG/DL
TRIGL SERPL-MCNC: 124 MG/DL

## 2024-08-26 PROCEDURE — 36415 COLL VENOUS BLD VENIPUNCTURE: CPT | Performed by: STUDENT IN AN ORGANIZED HEALTH CARE EDUCATION/TRAINING PROGRAM

## 2024-08-26 PROCEDURE — 82043 UR ALBUMIN QUANTITATIVE: CPT | Performed by: STUDENT IN AN ORGANIZED HEALTH CARE EDUCATION/TRAINING PROGRAM

## 2024-08-26 PROCEDURE — 82570 ASSAY OF URINE CREATININE: CPT | Performed by: STUDENT IN AN ORGANIZED HEALTH CARE EDUCATION/TRAINING PROGRAM

## 2024-08-26 PROCEDURE — 99214 OFFICE O/P EST MOD 30 MIN: CPT | Mod: GC | Performed by: STUDENT IN AN ORGANIZED HEALTH CARE EDUCATION/TRAINING PROGRAM

## 2024-08-26 PROCEDURE — 80061 LIPID PANEL: CPT | Performed by: STUDENT IN AN ORGANIZED HEALTH CARE EDUCATION/TRAINING PROGRAM

## 2024-08-26 PROCEDURE — 99207 PR FOOT EXAM NO CHARGE: CPT | Performed by: STUDENT IN AN ORGANIZED HEALTH CARE EDUCATION/TRAINING PROGRAM

## 2024-08-26 NOTE — PROGRESS NOTES
Assessment & Plan     Type 2 diabetes mellitus with hyperglycemia, with long-term current use of insulin  Patient reports to missing medications once or twice a week.  Does not like taking her Lantus.  Patient reports she has not taken Trulicity since discharge since it was not sent to the pharmacy. She reports to checking her blood sugars once daily.  Times vary.  Her glucose meter reads high most of the time. Today she is denying hypoglycemia symptoms however does report to hyperglycemia symptoms. I had an extensive talk with the patient about uncontrolled diabetes with her past medical history of NSTEMI/CABG, hypertension, diabetic nephropathy, neuropathy.  Informed her that it is difficult to manage her diabetes if she is not regularly checking her blood sugars.  Is not interested in continuous glucose monitoring.  Last A1c on 7/20/2024 was >20. She is accompanied by her daughter-in-law who states that the patient is really not concerned about her diabetes.  Patient is agreeable with the changes below  Plan:  Increased empagliflozin (JARDIANCE) from 10 mg to 25 MG TABS tablet daily  Discontinue Trulicity, started tirzepatide (MOUNJARO) 2.5 MG/0.5ML pen  insulin glargine (LANTUS PEN) 100 UNIT/ML pen creased from 10 units at bedtime to 20 units  Repeat Lipid panel: Last year LDL was 53 which is at goal given her risk factors.  She is currently on atorvastatin 80mg.  If LDL is high will consider change to rosuvastatin 40mg   Encouraged patient to follow-up with her primary ophthalmologist for diabetic eye exam  AR FOOT EXAM NO CHARGE      Return in about 2 weeks (around 9/9/2024) for Follow up with Pharmacy and PCP .    Arnold Burden is a 79 year old, presenting for the following health issues:  Follow Up (Dm)      8/26/2024     4:38 PM   Additional Questions   Roomed by ML   Accompanied by daughter in law         8/26/2024    Information    services provided? Yes   Language Hmong   Type  "of interpretation provided Face-to-face    name JAYANT    ID [id number]    Agency [employer]    phone number [phone]        Via the Health Maintenance questionnaire, the patient has reported the following services have been completed -Eye Exam: Don t remember 2024-04-04, this information has been sent to the abstraction team.  HPI   Checks glucose once a day. Times vary.  Reading comes up as high within no number.  Is not interested in continuous glucose monitoring  Denies hypoglycemia symptoms   Reports hyperglycemia symptoms i.e. increased thirst, polyuria  Reports to missing medications 1-2 times a week.  Does not like taking insulin    Review of Systems  Constitutional, HEENT, cardiovascular, pulmonary, gi and gu systems are negative, except as otherwise noted.      Objective    BP 92/55   Pulse 66   Temp 98.6  F (37  C) (Oral)   Resp 18   Ht 1.499 m (4' 11\")   Wt 58.1 kg (128 lb)   SpO2 98%   BMI 25.85 kg/m    Body mass index is 25.85 kg/m .  Physical Exam   GENERAL: alert and no distress  RESP: lungs clear to auscultation - no rales, rhonchi or wheezes  CV: regular rate and rhythm, normal S1 S2, no S3 or S4, no murmur, click or rub, no peripheral edema  Diabetic foot exam: normal DP and PT pulses, no trophic changes or ulcerative lesions, and normal sensory exam       Precepted with Dr. Bladimir Manzano MD PGY3  [facility]   "

## 2024-08-26 NOTE — PROGRESS NOTES
Preceptor Attestation:    I discussed the patient with the resident and evaluated the patient in person. I have verified the content of the note, which accurately reflects my assessment of the patient and the plan of care.   Supervising Physician:  James Garrison MD.

## 2024-08-31 ENCOUNTER — HEALTH MAINTENANCE LETTER (OUTPATIENT)
Age: 79
End: 2024-08-31